# Patient Record
Sex: MALE | Race: WHITE | NOT HISPANIC OR LATINO | Employment: OTHER | URBAN - METROPOLITAN AREA
[De-identification: names, ages, dates, MRNs, and addresses within clinical notes are randomized per-mention and may not be internally consistent; named-entity substitution may affect disease eponyms.]

---

## 2017-05-24 ENCOUNTER — OFFICE VISIT (OUTPATIENT)
Dept: FAMILY MEDICINE | Facility: CLINIC | Age: 77
End: 2017-05-24
Payer: MEDICARE

## 2017-05-24 VITALS
BODY MASS INDEX: 20.57 KG/M2 | SYSTOLIC BLOOD PRESSURE: 144 MMHG | DIASTOLIC BLOOD PRESSURE: 80 MMHG | HEIGHT: 66 IN | TEMPERATURE: 98 F | WEIGHT: 128 LBS | HEART RATE: 68 BPM

## 2017-05-24 DIAGNOSIS — J06.9 UPPER RESPIRATORY TRACT INFECTION, UNSPECIFIED TYPE: Primary | ICD-10-CM

## 2017-05-24 PROCEDURE — 99999 PR PBB SHADOW E&M-EST. PATIENT-LVL III: CPT | Mod: PBBFAC,,, | Performed by: FAMILY MEDICINE

## 2017-05-24 PROCEDURE — 99214 OFFICE O/P EST MOD 30 MIN: CPT | Mod: S$GLB,,, | Performed by: FAMILY MEDICINE

## 2017-05-24 PROCEDURE — 1160F RVW MEDS BY RX/DR IN RCRD: CPT | Mod: S$GLB,,, | Performed by: FAMILY MEDICINE

## 2017-05-24 PROCEDURE — 1157F ADVNC CARE PLAN IN RCRD: CPT | Mod: S$GLB,,, | Performed by: FAMILY MEDICINE

## 2017-05-24 PROCEDURE — 1126F AMNT PAIN NOTED NONE PRSNT: CPT | Mod: S$GLB,,, | Performed by: FAMILY MEDICINE

## 2017-05-24 PROCEDURE — 1159F MED LIST DOCD IN RCRD: CPT | Mod: S$GLB,,, | Performed by: FAMILY MEDICINE

## 2017-05-24 RX ORDER — BENZONATATE 100 MG/1
100 CAPSULE ORAL 3 TIMES DAILY PRN
Qty: 30 CAPSULE | Refills: 0 | Status: SHIPPED | OUTPATIENT
Start: 2017-05-24 | End: 2017-06-03

## 2017-05-24 RX ORDER — CARBIDOPA, LEVODOPA AND ENTACAPONE 50; 200; 200 MG/1; MG/1; MG/1
1 TABLET, FILM COATED ORAL 2 TIMES DAILY
COMMUNITY
End: 2018-07-15

## 2017-05-24 NOTE — PROGRESS NOTES
Subjective:       Patient ID: Toni Kamara is a 77 y.o. male.    Chief Complaint: Cough (pt states he has had cold for about a week. Cough has worsened, with green mucus. ) and Chest Congestion (denies sore throat or fever. )    HPI   Patient with c/o 1 week productive cough with congestion. He does recall that it started with a post-nasal drip. Afebrile throughout.   Not taking any otcs other than cough drops.   Parkinson sx are controlled. He feels well otherwise. Does not typically get sick, and does not use otcs.    Review of Systems   Constitutional: Positive for fatigue. Negative for fever.   HENT: Positive for congestion and postnasal drip. Negative for rhinorrhea, sinus pressure, sneezing and sore throat.    Eyes: Negative for discharge and redness.   Respiratory: Positive for cough. Negative for shortness of breath and wheezing.    Gastrointestinal: Negative for diarrhea and nausea.   Neurological: Negative for dizziness and headaches.       Objective:      Physical Exam   Constitutional: He is oriented to person, place, and time. He appears well-developed and well-nourished.   HENT:   Head: Normocephalic and atraumatic.   Right Ear: External ear normal. No middle ear effusion.   Left Ear: External ear normal.  No middle ear effusion.   Nose: Mucosal edema and rhinorrhea present.   Mouth/Throat: Posterior oropharyngeal erythema present. No oropharyngeal exudate.   Cobblestoning op   Eyes: Conjunctivae and EOM are normal. Pupils are equal, round, and reactive to light. Right eye exhibits no discharge. Left eye exhibits no discharge. No scleral icterus.   Neck: Normal range of motion. Neck supple. No thyromegaly present.   Cardiovascular: Normal rate, regular rhythm and normal heart sounds.    Pulmonary/Chest: Effort normal and breath sounds normal. No respiratory distress. He has no wheezes. He has no rales.   Abdominal: Soft. Bowel sounds are normal. He exhibits no distension. There is no tenderness.    Lymphadenopathy:     He has no cervical adenopathy.   Neurological: He is alert and oriented to person, place, and time.   Skin: Skin is warm and dry. No rash noted.   Nursing note and vitals reviewed.        Upper respiratory tract infection, unspecified type  Side effects and precautions of medication use reviewed with patient, expressed understanding. No questions or concerns. Expected course of illness and sx tx incl otc med use reviewed. Notify MD if sx persist or worsen.   -     benzonatate (TESSALON) 100 MG capsule; Take 1 capsule (100 mg total) by mouth 3 (three) times daily as needed.  Dispense: 30 capsule; Refill: 0  Handwritten rx for abx (zpak) to start if no improvement 24-48hrs.  Medications compared to his current list for potential interactions.

## 2017-07-10 ENCOUNTER — TELEPHONE (OUTPATIENT)
Dept: FAMILY MEDICINE | Facility: CLINIC | Age: 77
End: 2017-07-10

## 2017-07-10 DIAGNOSIS — G25.0 ESSENTIAL TREMOR: Primary | ICD-10-CM

## 2017-07-10 NOTE — TELEPHONE ENCOUNTER
----- Message from RT Alma sent at 7/10/2017  3:29 PM CDT -----  Contact: pt    pt , pt requesting an appt to be worked in tomorrow 07/11/2017 during the afternoon for medication refill, thanks.

## 2017-07-10 NOTE — TELEPHONE ENCOUNTER
Pt was informed that Dr. Stout does not have an opening till 07/24/2017 pt wanted to schedule to see Malena Odom NP on 08/01/2017 tried to get him to come in sooner but he said that 08/01/2017 will be ok for him .

## 2017-07-10 NOTE — TELEPHONE ENCOUNTER
----- Message from Morris Foster sent at 7/10/2017  9:01 AM CDT -----  Contact: self  [phone]  Patient called asking for orders for labs to check diabetes.  Patient asking to come in today to get labs done.Thanks!

## 2017-07-10 NOTE — TELEPHONE ENCOUNTER
Attempted to contact pt to inform him that lab orders have been placed and pt needs to contact us so that we can schedule labs for him; no answer; left message to return call.

## 2017-07-18 ENCOUNTER — PATIENT OUTREACH (OUTPATIENT)
Dept: ADMINISTRATIVE | Facility: HOSPITAL | Age: 77
End: 2017-07-18

## 2017-07-18 NOTE — LETTER
July 18, 2017    Toni Kamara  [id number] [address]  [place] [address]             [facility]  [address]  [facility] [address]  Phone: [phone] Dear Mr. Kamara:    [facility] is committed to your overall health.  To help you get the most out of each of your visits, we will review your information to make sure you are up to date on all of your recommended tests and/or procedures.      Malena Odom [facility] has found that you may be due for tetanus, shingles, and pneumonia immunizations.     Medicare does not cover all immunizations to be given in the clinic.  Check your benefits to ensure that you do not need to receive your immunizations at the pharmacy.    If you have had any of the above done at another facility, please bring the records or information with you so that your record at [facility] will be complete.  If you would like to schedule any of these, please contact me.    If you are currently taking medication, please bring it with you to your appointment for review.    Also, if you have any type of Advanced Directives, please bring them with you to your office visit so we may scan them into your chart.    If you have any questions or concerns, please don't hesitate to call.    Thank you for letting us care for you,  Mckenna Ramos LPN Clinical Care Coordinator  [facility]  [phone]

## 2017-07-31 ENCOUNTER — TELEPHONE (OUTPATIENT)
Dept: FAMILY MEDICINE | Facility: CLINIC | Age: 77
End: 2017-07-31

## 2017-07-31 NOTE — TELEPHONE ENCOUNTER
----- Message from Emmett Burris sent at 7/31/2017  2:09 PM CDT -----  Contact: same  Patient called in and stated he has his lab work & appt tomorrow 8/1 and would like to check his calcium levels also in his lab work.  Patient call back number is [phone]

## 2017-08-01 ENCOUNTER — OFFICE VISIT (OUTPATIENT)
Dept: FAMILY MEDICINE | Facility: CLINIC | Age: 77
End: 2017-08-01
Payer: MEDICARE

## 2017-08-01 ENCOUNTER — LAB VISIT (OUTPATIENT)
Dept: LAB | Facility: HOSPITAL | Age: 77
End: 2017-08-01
Attending: FAMILY MEDICINE
Payer: MEDICARE

## 2017-08-01 VITALS
WEIGHT: 128.88 LBS | DIASTOLIC BLOOD PRESSURE: 71 MMHG | HEIGHT: 66 IN | TEMPERATURE: 98 F | SYSTOLIC BLOOD PRESSURE: 120 MMHG | OXYGEN SATURATION: 97 % | HEART RATE: 89 BPM | BODY MASS INDEX: 20.71 KG/M2

## 2017-08-01 DIAGNOSIS — G25.0 ESSENTIAL TREMOR: ICD-10-CM

## 2017-08-01 DIAGNOSIS — F51.04 PSYCHOPHYSIOLOGICAL INSOMNIA: Primary | ICD-10-CM

## 2017-08-01 DIAGNOSIS — G20.A1 PARKINSON DISEASE: ICD-10-CM

## 2017-08-01 LAB
ALBUMIN SERPL BCP-MCNC: 4 G/DL
ALP SERPL-CCNC: 50 U/L
ALT SERPL W/O P-5'-P-CCNC: <5 U/L
ANION GAP SERPL CALC-SCNC: 9 MMOL/L
AST SERPL-CCNC: 21 U/L
BILIRUB SERPL-MCNC: 1.1 MG/DL
BUN SERPL-MCNC: 23 MG/DL
CALCIUM SERPL-MCNC: 9.5 MG/DL
CHLORIDE SERPL-SCNC: 109 MMOL/L
CO2 SERPL-SCNC: 26 MMOL/L
CREAT SERPL-MCNC: 1.4 MG/DL
EST. GFR  (AFRICAN AMERICAN): 56 ML/MIN/1.73 M^2
EST. GFR  (NON AFRICAN AMERICAN): 48.5 ML/MIN/1.73 M^2
ESTIMATED AVG GLUCOSE: 105 MG/DL
GLUCOSE SERPL-MCNC: 86 MG/DL
HBA1C MFR BLD HPLC: 5.3 %
POTASSIUM SERPL-SCNC: 4.3 MMOL/L
PROT SERPL-MCNC: 7.1 G/DL
SODIUM SERPL-SCNC: 144 MMOL/L

## 2017-08-01 PROCEDURE — 99999 PR PBB SHADOW E&M-EST. PATIENT-LVL III: CPT | Mod: PBBFAC,,, | Performed by: NURSE PRACTITIONER

## 2017-08-01 PROCEDURE — 1159F MED LIST DOCD IN RCRD: CPT | Mod: S$GLB,,, | Performed by: NURSE PRACTITIONER

## 2017-08-01 PROCEDURE — 1126F AMNT PAIN NOTED NONE PRSNT: CPT | Mod: S$GLB,,, | Performed by: NURSE PRACTITIONER

## 2017-08-01 PROCEDURE — 99213 OFFICE O/P EST LOW 20 MIN: CPT | Mod: S$GLB,,, | Performed by: NURSE PRACTITIONER

## 2017-08-01 PROCEDURE — 99499 UNLISTED E&M SERVICE: CPT | Mod: S$PBB,,, | Performed by: NURSE PRACTITIONER

## 2017-08-01 RX ORDER — CLONAZEPAM 2 MG/1
TABLET ORAL
Qty: 90 TABLET | Refills: 0 | Status: SHIPPED | OUTPATIENT
Start: 2017-08-01 | End: 2017-08-01

## 2017-08-01 RX ORDER — CLONAZEPAM 2 MG/1
TABLET ORAL
Qty: 90 TABLET | Refills: 1 | Status: SHIPPED | OUTPATIENT
Start: 2017-08-01 | End: 2018-02-05 | Stop reason: SDUPTHER

## 2017-08-01 NOTE — PROGRESS NOTES
Subjective:       Patient ID: Toni Kamara is a 77 y.o. male.    Chief Complaint: Medication Refill    HPI     Patient presents to clinic for medication refill. He is currently maintained on klonopin 2mg for insomnia and takes nightly PRN.   Reports that he has been maintained on this regimen for > 30 years without adverse effects.    reviewed and is consistent.     Review of Systems   Constitutional: Negative for activity change, chills and fever.   Respiratory: Negative for cough, shortness of breath and stridor.    Cardiovascular: Negative for chest pain and palpitations.   Psychiatric/Behavioral: Negative for confusion, dysphoric mood, hallucinations, self-injury, sleep disturbance and suicidal ideas. The patient is not nervous/anxious.        Objective:      Physical Exam   Constitutional: He is oriented to person, place, and time. He appears well-developed and well-nourished.   HENT:   Head: Normocephalic and atraumatic.   Cardiovascular: Normal rate, regular rhythm, normal heart sounds and intact distal pulses.    No murmur heard.  Pulmonary/Chest: Effort normal and breath sounds normal. No respiratory distress. He has no wheezes. He has no rales.   Musculoskeletal: Normal range of motion. He exhibits no edema, tenderness or deformity.   Neurological: He is alert and oriented to person, place, and time. No cranial nerve deficit.   Skin: Skin is warm and dry.   Psychiatric: He has a normal mood and affect. His behavior is normal.   Nursing note and vitals reviewed.      Assessment:       1. Psychophysiological insomnia    2. Parkinson disease        Plan:   Toni Brooks was seen today for medication refill.    Diagnoses and all orders for this visit:    Psychophysiological insomnia  -     clonazePAM (KLONOPIN) 2 MG Tab; TAKE ONE TABLET BY MOUTH ONCE DAILY AT BEDTIME AS NEEDED  Side effects and precautions of medication use reviewed with patient, expressed understanding. No questions or  concerns.    Parkinson disease  Stable. Continue current regimen. F/u with neurology.

## 2017-12-27 ENCOUNTER — TELEPHONE (OUTPATIENT)
Dept: FAMILY MEDICINE | Facility: CLINIC | Age: 77
End: 2017-12-27

## 2017-12-27 NOTE — TELEPHONE ENCOUNTER
Tried to reach pt. No answer, left msg to call back.    Contacting to inform pt that there are no openings okay and that the appt for tomorrow is the earliest to be seen.

## 2017-12-28 ENCOUNTER — OFFICE VISIT (OUTPATIENT)
Dept: FAMILY MEDICINE | Facility: CLINIC | Age: 77
End: 2017-12-28
Payer: MEDICARE

## 2017-12-28 VITALS
HEART RATE: 68 BPM | TEMPERATURE: 99 F | WEIGHT: 126.88 LBS | DIASTOLIC BLOOD PRESSURE: 78 MMHG | HEIGHT: 66 IN | SYSTOLIC BLOOD PRESSURE: 136 MMHG | BODY MASS INDEX: 20.39 KG/M2

## 2017-12-28 DIAGNOSIS — I49.1 PREMATURE CONTRACTIONS, ATRIAL: ICD-10-CM

## 2017-12-28 DIAGNOSIS — I10 ESSENTIAL HYPERTENSION: Primary | ICD-10-CM

## 2017-12-28 PROCEDURE — 99499 UNLISTED E&M SERVICE: CPT | Mod: S$PBB,,, | Performed by: FAMILY MEDICINE

## 2017-12-28 PROCEDURE — 99214 OFFICE O/P EST MOD 30 MIN: CPT | Mod: S$GLB,,, | Performed by: FAMILY MEDICINE

## 2017-12-28 PROCEDURE — 99999 PR PBB SHADOW E&M-EST. PATIENT-LVL III: CPT | Mod: PBBFAC,,, | Performed by: FAMILY MEDICINE

## 2017-12-28 RX ORDER — LOSARTAN POTASSIUM 50 MG/1
50 TABLET ORAL DAILY
Qty: 90 TABLET | Refills: 3 | Status: SHIPPED | OUTPATIENT
Start: 2017-12-28 | End: 2019-01-02 | Stop reason: SDUPTHER

## 2017-12-28 NOTE — PROGRESS NOTES
"Subjective:       Patient ID: Toni Kamara is a 77 y.o. male.    Chief Complaint: Hypertension    He was late today because he had trouble finding her new clinic.  He was noted to have high blood pressure at the end of November when he visited his urologist.  He sees Dr Youssef in [place].  His readings range from 150-170.  He has taken blood pressure at home with a wrist cuff which shows as high as 180.  An arm cuff at the same time shows around 125.  Grocery store readings have shown between 135 and 145.  He was given a blood pressure medication but did not take it and does not remember which one it was.  He does not consume a very salty diet.  He has Parkinson's and he seems to be coping fairly well with that.  He is a little slow to start ambulation but then is able to move well.      Hypertension   Pertinent negatives include no chest pain, palpitations or shortness of breath.     Review of Systems   Constitutional: Negative for unexpected weight change.   Respiratory: Negative for cough and shortness of breath.    Cardiovascular: Negative for chest pain, palpitations and leg swelling.       Objective:     Blood pressure 136/78, pulse 68, temperature 98.6 °F (37 °C), height 5' 6" (1.676 m), weight 57.6 kg (126 lb 14 oz).   repeat readings on the right 155/88.  Left arm 162/94.  Difficult to be precise because of frequent PCs    Physical Exam   Constitutional: He appears well-developed and well-nourished. No distress.   Cardiovascular:   His heart rhythm is slightly irregular with frequent PVCs.  No heart murmur or S3 heard.  No carotid bruits.  No abdominal bruits.   Pulmonary/Chest: Effort normal and breath sounds normal. No respiratory distress. He has no wheezes.   Musculoskeletal: He exhibits no edema.   Neurological:   Right hand and arm tremor is the most prominent.  Slow to get out of a chair and start to walk.       Assessment:       1. Essential hypertension    2. Premature contractions, atrial  "       Plan:       Start losartan 50 mg.  Follow-up with me in 4 weeks.  EKG and lab work including urinalysis ordered.  I will also get an echocardiogram.  His wife expressed concern.

## 2018-01-02 ENCOUNTER — LAB VISIT (OUTPATIENT)
Dept: LAB | Facility: HOSPITAL | Age: 78
End: 2018-01-02
Attending: FAMILY MEDICINE
Payer: MEDICARE

## 2018-01-02 ENCOUNTER — CLINICAL SUPPORT (OUTPATIENT)
Dept: CARDIOLOGY | Facility: CLINIC | Age: 78
End: 2018-01-02
Payer: MEDICARE

## 2018-01-02 ENCOUNTER — CLINICAL SUPPORT (OUTPATIENT)
Dept: CARDIOLOGY | Facility: CLINIC | Age: 78
End: 2018-01-02
Attending: FAMILY MEDICINE
Payer: MEDICARE

## 2018-01-02 DIAGNOSIS — I10 ESSENTIAL HYPERTENSION: ICD-10-CM

## 2018-01-02 LAB
ALBUMIN SERPL BCP-MCNC: 4.1 G/DL
ALP SERPL-CCNC: 56 U/L
ALT SERPL W/O P-5'-P-CCNC: 10 U/L
ANION GAP SERPL CALC-SCNC: 5 MMOL/L
AST SERPL-CCNC: 25 U/L
BILIRUB SERPL-MCNC: 0.7 MG/DL
BUN SERPL-MCNC: 17 MG/DL
CALCIUM SERPL-MCNC: 9.6 MG/DL
CHLORIDE SERPL-SCNC: 105 MMOL/L
CHOLEST SERPL-MCNC: 175 MG/DL
CHOLEST/HDLC SERPL: 2.6 {RATIO}
CO2 SERPL-SCNC: 29 MMOL/L
CREAT SERPL-MCNC: 1.2 MG/DL
ERYTHROCYTE [DISTWIDTH] IN BLOOD BY AUTOMATED COUNT: 12.8 %
EST. GFR  (AFRICAN AMERICAN): >60 ML/MIN/1.73 M^2
EST. GFR  (NON AFRICAN AMERICAN): 58.4 ML/MIN/1.73 M^2
GLUCOSE SERPL-MCNC: 96 MG/DL
HCT VFR BLD AUTO: 44 %
HDLC SERPL-MCNC: 68 MG/DL
HDLC SERPL: 38.9 %
HGB BLD-MCNC: 14.7 G/DL
LDLC SERPL CALC-MCNC: 92 MG/DL
MCH RBC QN AUTO: 31.6 PG
MCHC RBC AUTO-ENTMCNC: 33.4 G/DL
MCV RBC AUTO: 95 FL
NONHDLC SERPL-MCNC: 107 MG/DL
PLATELET # BLD AUTO: 165 K/UL
PMV BLD AUTO: 11.1 FL
POTASSIUM SERPL-SCNC: 4.6 MMOL/L
PROT SERPL-MCNC: 7.3 G/DL
RBC # BLD AUTO: 4.65 M/UL
SODIUM SERPL-SCNC: 139 MMOL/L
TRIGL SERPL-MCNC: 75 MG/DL
WBC # BLD AUTO: 4.8 K/UL

## 2018-01-02 PROCEDURE — 80053 COMPREHEN METABOLIC PANEL: CPT

## 2018-01-02 PROCEDURE — 93000 ELECTROCARDIOGRAM COMPLETE: CPT | Mod: S$GLB,,, | Performed by: INTERNAL MEDICINE

## 2018-01-02 PROCEDURE — 93306 TTE W/DOPPLER COMPLETE: CPT | Mod: S$GLB,,, | Performed by: INTERNAL MEDICINE

## 2018-01-02 PROCEDURE — 80061 LIPID PANEL: CPT

## 2018-01-02 PROCEDURE — 85027 COMPLETE CBC AUTOMATED: CPT

## 2018-01-02 PROCEDURE — 36415 COLL VENOUS BLD VENIPUNCTURE: CPT | Mod: PO

## 2018-01-04 LAB
AORTIC VALVE REGURGITATION: NORMAL
DIASTOLIC DYSFUNCTION: NO
ESTIMATED PA SYSTOLIC PRESSURE: 29.01
MITRAL VALVE MOBILITY: NORMAL
MITRAL VALVE REGURGITATION: NORMAL
RETIRED EF AND QEF - SEE NOTES: 55 (ref 55–65)
TRICUSPID VALVE REGURGITATION: NORMAL

## 2018-01-05 ENCOUNTER — TELEPHONE (OUTPATIENT)
Dept: FAMILY MEDICINE | Facility: CLINIC | Age: 78
End: 2018-01-05

## 2018-01-05 NOTE — TELEPHONE ENCOUNTER
----- Message from Marti Long sent at 1/5/2018  9:51 AM CST -----  Contact: Self  Patient is returning the doctors call, please have Doctor Stout call the patients wife Radha at [phone] or [phone], with the results of his tests.  Thank you!

## 2018-02-05 ENCOUNTER — OFFICE VISIT (OUTPATIENT)
Dept: FAMILY MEDICINE | Facility: CLINIC | Age: 78
End: 2018-02-05
Payer: MEDICARE

## 2018-02-05 VITALS
HEIGHT: 66 IN | SYSTOLIC BLOOD PRESSURE: 108 MMHG | HEART RATE: 80 BPM | DIASTOLIC BLOOD PRESSURE: 68 MMHG | WEIGHT: 128.63 LBS | BODY MASS INDEX: 20.67 KG/M2 | TEMPERATURE: 99 F

## 2018-02-05 DIAGNOSIS — I10 ESSENTIAL HYPERTENSION: ICD-10-CM

## 2018-02-05 DIAGNOSIS — F51.04 PSYCHOPHYSIOLOGICAL INSOMNIA: ICD-10-CM

## 2018-02-05 DIAGNOSIS — G20.A1 PARKINSON DISEASE: Primary | ICD-10-CM

## 2018-02-05 PROCEDURE — 1126F AMNT PAIN NOTED NONE PRSNT: CPT | Mod: S$GLB,,, | Performed by: FAMILY MEDICINE

## 2018-02-05 PROCEDURE — 1159F MED LIST DOCD IN RCRD: CPT | Mod: S$GLB,,, | Performed by: FAMILY MEDICINE

## 2018-02-05 PROCEDURE — 99999 PR PBB SHADOW E&M-EST. PATIENT-LVL III: CPT | Mod: PBBFAC,,, | Performed by: FAMILY MEDICINE

## 2018-02-05 PROCEDURE — 99499 UNLISTED E&M SERVICE: CPT | Mod: S$GLB,,, | Performed by: FAMILY MEDICINE

## 2018-02-05 PROCEDURE — 99214 OFFICE O/P EST MOD 30 MIN: CPT | Mod: S$GLB,,, | Performed by: FAMILY MEDICINE

## 2018-02-05 PROCEDURE — 3008F BODY MASS INDEX DOCD: CPT | Mod: S$GLB,,, | Performed by: FAMILY MEDICINE

## 2018-02-05 RX ORDER — CLONAZEPAM 2 MG/1
TABLET ORAL
Qty: 90 TABLET | Refills: 1 | Status: SHIPPED | OUTPATIENT
Start: 2018-02-05 | End: 2018-07-12 | Stop reason: SDUPTHER

## 2018-02-05 NOTE — PROGRESS NOTES
"Subjective:       Patient ID: Toni Kamara is a 77 y.o. male.    Chief Complaint: Follow-up (med f/u)    Mr. Kamara is a 76 yo male with a pmh significant for Parkinson disease and HTN who presents to the clinic today for lab follow up and prescription refill. Lipid panel, CBC, and CMP values were all within normal range. Patient also had an echocardiogram and EKG in December that showed no abnormalities besides mild mitral regurgitation.    HTN.  He was started on Losartan 50mg one a day in December due to high blood pressure readings in the 150-170's from he neurologist office. BP today is 108/68. Patient denies dizziness and symptoms of postural hypotension.     Parkinson disease. He is currently taking carbidopa/levodopa and ropinirole. He feels his symptoms are well controlled except for his right arm.       Review of Systems   Constitutional: Negative for activity change and appetite change.   Respiratory: Negative for chest tightness, shortness of breath and wheezing.    Cardiovascular: Negative for chest pain and palpitations.   Gastrointestinal: Negative for abdominal pain and blood in stool.   Genitourinary: Negative for difficulty urinating and dysuria.   Musculoskeletal: Positive for back pain (chronic). Negative for arthralgias and myalgias.   Neurological: Negative for dizziness, light-headedness and headaches.       Objective:     Blood pressure 108/68, pulse 80, temperature 98.8 °F (37.1 °C), temperature source Oral, height 5' 6" (1.676 m), weight 58.3 kg (128 lb 10.2 oz).      Physical Exam   Constitutional: He is oriented to person, place, and time. He appears well-developed. No distress.   He is a frail appearing man with a right hand tremor   HENT:   Head: Normocephalic.   Cardiovascular: Normal rate, regular rhythm, normal heart sounds and intact distal pulses.    Pulmonary/Chest: Effort normal and breath sounds normal. No respiratory distress.   Neurological: He is alert and oriented to " person, place, and time. Gait normal.   Gait is normal except for reduced right arm swinging. No shuffling of feet.       Assessment:       1. Parkinson disease    2. Psychophysiological insomnia    3. Essential hypertension        Plan:       1. Parkinson disease   - continue medication regimen   - follow-up with neurology  2. Insomnia   - continue clonazepam  2. HTN   - BP readings in he low 100's, patient advised to continue to monitor for signs of hypotension   - continue losartan 50 mg

## 2018-02-06 ENCOUNTER — TELEPHONE (OUTPATIENT)
Dept: FAMILY MEDICINE | Facility: CLINIC | Age: 78
End: 2018-02-06

## 2018-02-06 NOTE — TELEPHONE ENCOUNTER
Spoke with pt, he states he has occasional episode of lightheadedness throughout the day, he does not experience them if he is up and active. He has not checked his bp today. Advised him next time he has this feeling, to check his bp and give us a call with any readings of 90/70 or below. He expressed understanding.

## 2018-02-06 NOTE — TELEPHONE ENCOUNTER
----- Message from Morris Foster sent at 2/6/2018  2:12 PM CST -----  Contact: self  [phone]  Patient called to give the name of rx  losartan 50 mg. The patient states he feel light headed off and on  today. Thanks!

## 2018-04-27 ENCOUNTER — TELEPHONE (OUTPATIENT)
Dept: FAMILY MEDICINE | Facility: CLINIC | Age: 78
End: 2018-04-27

## 2018-04-27 NOTE — TELEPHONE ENCOUNTER
Tried to reach pt. No answer, left msg to call back.    [facility]

## 2018-04-27 NOTE — TELEPHONE ENCOUNTER
Spoke w/ wife and advised that pcp is not in office and then upon review of the schedule there were no opening for this clinic until Monday. wife advised to go to UC/ED w/ pt. wife verbalized understanding. wife states that they will go to the clinic in [place].

## 2018-04-27 NOTE — TELEPHONE ENCOUNTER
Tried to reach pt. No answer, left msg to call back.    [facility] Will try cell.

## 2018-04-27 NOTE — TELEPHONE ENCOUNTER
----- Message from RT Alma sent at 4/27/2018  8:33 AM CDT -----  Contact: Radha,Wife, [phone]   Radha,Wife, [phone], requesting an appt for the pt to be worked in today, after 02:00 pm, pt keeps falling and does not have any balance, thanks.

## 2018-05-02 ENCOUNTER — OFFICE VISIT (OUTPATIENT)
Dept: FAMILY MEDICINE | Facility: CLINIC | Age: 78
End: 2018-05-02
Payer: MEDICARE

## 2018-05-02 VITALS
TEMPERATURE: 98 F | SYSTOLIC BLOOD PRESSURE: 102 MMHG | DIASTOLIC BLOOD PRESSURE: 70 MMHG | WEIGHT: 128.5 LBS | HEIGHT: 66 IN | BODY MASS INDEX: 20.65 KG/M2

## 2018-05-02 DIAGNOSIS — G20.A1 PARKINSON DISEASE: ICD-10-CM

## 2018-05-02 DIAGNOSIS — G72.9 MYOPATHY: ICD-10-CM

## 2018-05-02 DIAGNOSIS — R60.0 LOCALIZED EDEMA: ICD-10-CM

## 2018-05-02 DIAGNOSIS — M79.89 LEG SWELLING: Primary | ICD-10-CM

## 2018-05-02 PROCEDURE — 3078F DIAST BP <80 MM HG: CPT | Mod: CPTII,S$GLB,, | Performed by: FAMILY MEDICINE

## 2018-05-02 PROCEDURE — 99999 PR PBB SHADOW E&M-EST. PATIENT-LVL III: CPT | Mod: PBBFAC,,, | Performed by: FAMILY MEDICINE

## 2018-05-02 PROCEDURE — 3074F SYST BP LT 130 MM HG: CPT | Mod: CPTII,S$GLB,, | Performed by: FAMILY MEDICINE

## 2018-05-02 PROCEDURE — 99215 OFFICE O/P EST HI 40 MIN: CPT | Mod: S$GLB,,, | Performed by: FAMILY MEDICINE

## 2018-05-02 RX ORDER — CARBIDOPA AND LEVODOPA 50; 200 MG/1; MG/1
1 TABLET, EXTENDED RELEASE ORAL 2 TIMES DAILY
COMMUNITY
Start: 2018-03-21 | End: 2018-05-02 | Stop reason: SDUPTHER

## 2018-05-02 RX ORDER — CARBIDOPA AND LEVODOPA 10; 100 MG/1; MG/1
TABLET ORAL
COMMUNITY
Start: 2018-03-01 | End: 2018-09-04 | Stop reason: CLARIF

## 2018-05-02 RX ORDER — AMOXICILLIN AND CLAVULANATE POTASSIUM 500; 125 MG/1; MG/1
1 TABLET, FILM COATED ORAL 2 TIMES DAILY
COMMUNITY
Start: 2018-04-30 | End: 2018-07-15

## 2018-05-02 NOTE — PROGRESS NOTES
Subjective:       Patient ID: Toni Kamara is a 78 y.o. male.    Chief Complaint: Fever; Foot Swelling; and Urinary Tract Infection    He is here for 30 minutes after his appointment time.  He is accompanied by his wife and daughter.  He has a very long history.  Over the past couple of weeks he has become less ambulatory and has fallen frequently.  He has Parkinson's and has been followed by Dr. Youssef, neurology, [place].  His carbidopa levodopa has been gradually increased with minimal benefit.  His steps have been getting slower and his ambulation getting worse and he has been needing a walker.  He reported to [facility] with weakness and some urinary urgency.  He was felt to be somewhat dehydrated based on an elevated BUN.  His daughter relates that he had a tachycardia and elevated blood pressure.  He was treated with Augmentin.  His urgency is gradually improving.  He did have a temperature to 99.6 earlier this morning with no chills or sweats.  His blood pressure is a bit low today.  He continues taking losartan 50 mg.  I reviewed some of his lab work.  He had a normal CBC and an elevated CPK of 569.  His BUN was 26 and creatinine was 1.07.  His serum albumin was 4.1.  His liver enzymes were normal.  His troponin I was normal.  He had a negative brain CT scan without contrast.  It showed mild prominence of the ventricles compatible with mild involutional changes.  There was no intracranial hemorrhage.  He had a CT of the pelvis as well as a negative chest x-ray.  He developed bilateral leg swelling worse on the left over the past month.  It has worsened since his hospitalization.  He is also having an altered sleep pattern.      Fever    Pertinent negatives include no abdominal pain, chest pain or coughing.   Urinary Tract Infection    Associated symptoms include urgency.     Review of Systems   Constitutional: Positive for fever. Negative for appetite change and unexpected weight  "change.   Respiratory: Negative for cough and shortness of breath.    Cardiovascular: Positive for leg swelling. Negative for chest pain.   Gastrointestinal: Negative for abdominal pain.   Genitourinary: Positive for urgency.   Musculoskeletal: Positive for gait problem.       Objective:     Blood pressure 102/70, temperature 98.4 °F (36.9 °C), temperature source Oral, height 5' 6" (1.676 m), weight 58.3 kg (128 lb 8.5 oz).      Physical Exam   Constitutional:   He is a thin male that looks a little fragile   Neck: No thyromegaly present.   Cardiovascular: Normal rate and regular rhythm.    Pulmonary/Chest: Effort normal and breath sounds normal. No respiratory distress.   Abdominal: There is no tenderness.   Musculoskeletal: He exhibits edema (3+ left foot and lower leg edema palpable pedal pulses on the right.  I'm not able to feel them on the left because of the swelling. edema on the right).   Lymphadenopathy:     He has no cervical adenopathy.   Neurological: He is alert.   He had to push a little bit to get out of his chair.  He was a bit unstable but was able to walk reasonably well with a walker.  His family reports that oftentimes his steps are short and less he is coached.  There is no sign of facial asymmetry or asymmetric muscle weakness.   Skin: No erythema.       Assessment:       1. Leg swelling    2. Parkinson disease    3. Localized edema     4. Myopathy         Plan:       Leg edema which may be venous insufficiency or DVT.  He does not seem to have CHF, renal failure, hepatic failure.  Venous ultrasound ordered.  Follow-up lab work ordered.  Elevated CPK may be trauma or polymyositis.  The family requests free T3 and free T4 as well as homocystine.  He is seeing his neurologist tomorrow.  They are asking the question of a recent stroke.  I will leave that to his judgment.   50 minutes were spent with this visit, more than half with discussion of decision-making.  "

## 2018-05-08 ENCOUNTER — HOSPITAL ENCOUNTER (OUTPATIENT)
Dept: RADIOLOGY | Facility: HOSPITAL | Age: 78
Discharge: HOME | End: 2018-05-08
Attending: FAMILY MEDICINE
Payer: MEDICARE

## 2018-05-08 DIAGNOSIS — M79.89 LEG SWELLING: ICD-10-CM

## 2018-05-08 DIAGNOSIS — R60.0 LOCALIZED EDEMA: ICD-10-CM

## 2018-05-08 PROCEDURE — 93970 EXTREMITY STUDY: CPT | Mod: TC,PO

## 2018-05-08 PROCEDURE — 93970 EXTREMITY STUDY: CPT | Mod: 26,,, | Performed by: RADIOLOGY

## 2018-06-13 ENCOUNTER — TELEPHONE (OUTPATIENT)
Dept: FAMILY MEDICINE | Facility: CLINIC | Age: 78
End: 2018-06-13

## 2018-06-13 NOTE — TELEPHONE ENCOUNTER
"Spoke to pt. Pt states he took his blood pressure at [facility] "yesterday" and it was 80/45.  He takes his losartan at night and took it last night after the low reading at [facility].  Pt unable to take his blood pressure at this time, therefore I asked him to come to the clinic to have a manual reading done.  Pt agreed and will have his wife drive him to clinic.   "

## 2018-06-13 NOTE — TELEPHONE ENCOUNTER
----- Message from Clair Vargas sent at 6/13/2018  3:01 PM CDT -----  Contact: self  Patient's physical therapist came over to the house and  took his blood pressure and it is 110 over 70. Patient will not be coming in today. Please call wife at Radha at [phone] if any questions. Thanks!

## 2018-06-18 ENCOUNTER — TELEPHONE (OUTPATIENT)
Dept: ADMINISTRATIVE | Facility: CLINIC | Age: 78
End: 2018-06-18

## 2018-07-12 DIAGNOSIS — F51.04 PSYCHOPHYSIOLOGICAL INSOMNIA: ICD-10-CM

## 2018-07-13 NOTE — TELEPHONE ENCOUNTER
I cannot authorize 90 of the 2 mg lorazepam for a 78-year-old patient with Parkinson's disease.  This must wait until his PCP returns.  If the patient is completely out and in danger of withdrawal that I can send in a very limited supply.  But I do not authorize 90 days supply for controlled substances even for my patients that I am familiar with.

## 2018-07-15 ENCOUNTER — HOSPITAL ENCOUNTER (OUTPATIENT)
Facility: HOSPITAL | Age: 78
Discharge: HOME HEALTH | End: 2018-07-16
Attending: EMERGENCY MEDICINE | Admitting: INTERNAL MEDICINE
Payer: MEDICARE

## 2018-07-15 DIAGNOSIS — I63.9 CEREBROVASCULAR ACCIDENT (CVA), UNSPECIFIED MECHANISM: ICD-10-CM

## 2018-07-15 DIAGNOSIS — I63.9 CVA (CEREBRAL VASCULAR ACCIDENT): ICD-10-CM

## 2018-07-15 DIAGNOSIS — E86.0 DEHYDRATION: Primary | ICD-10-CM

## 2018-07-15 PROBLEM — R29.898 LEFT ARM WEAKNESS: Status: ACTIVE | Noted: 2018-07-15

## 2018-07-15 PROBLEM — R53.81 DEBILITY: Status: ACTIVE | Noted: 2018-07-15

## 2018-07-15 PROBLEM — R29.6 FREQUENT FALLS: Status: ACTIVE | Noted: 2018-07-15

## 2018-07-15 LAB
ALBUMIN SERPL BCP-MCNC: 4 G/DL
ALP SERPL-CCNC: 92 U/L
ALT SERPL W/O P-5'-P-CCNC: 12 U/L
ANION GAP SERPL CALC-SCNC: 10 MMOL/L
AST SERPL-CCNC: 50 U/L
BASOPHILS # BLD AUTO: 0 K/UL
BASOPHILS NFR BLD: 0 %
BILIRUB SERPL-MCNC: 1.3 MG/DL
BILIRUB UR QL STRIP: NEGATIVE
BUN SERPL-MCNC: 27 MG/DL
CALCIUM SERPL-MCNC: 9.8 MG/DL
CHLORIDE SERPL-SCNC: 108 MMOL/L
CLARITY UR: CLEAR
CO2 SERPL-SCNC: 27 MMOL/L
COLOR UR: YELLOW
CREAT SERPL-MCNC: 1.3 MG/DL
DIFFERENTIAL METHOD: ABNORMAL
EOSINOPHIL # BLD AUTO: 0 K/UL
EOSINOPHIL NFR BLD: 0.1 %
ERYTHROCYTE [DISTWIDTH] IN BLOOD BY AUTOMATED COUNT: 13.2 %
EST. GFR  (AFRICAN AMERICAN): >60 ML/MIN/1.73 M^2
EST. GFR  (NON AFRICAN AMERICAN): 53 ML/MIN/1.73 M^2
GLUCOSE SERPL-MCNC: 92 MG/DL
GLUCOSE UR QL STRIP: NEGATIVE
HCT VFR BLD AUTO: 41.9 %
HGB BLD-MCNC: 13.8 G/DL
HGB UR QL STRIP: NEGATIVE
KETONES UR QL STRIP: ABNORMAL
LACTATE SERPL-SCNC: 1.2 MMOL/L
LACTATE SERPL-SCNC: 1.3 MMOL/L
LEUKOCYTE ESTERASE UR QL STRIP: NEGATIVE
LYMPHOCYTES # BLD AUTO: 1 K/UL
LYMPHOCYTES NFR BLD: 15.3 %
MCH RBC QN AUTO: 30.8 PG
MCHC RBC AUTO-ENTMCNC: 32.9 G/DL
MCV RBC AUTO: 94 FL
MONOCYTES # BLD AUTO: 0.6 K/UL
MONOCYTES NFR BLD: 10 %
NEUTROPHILS # BLD AUTO: 4.6 K/UL
NEUTROPHILS NFR BLD: 74.6 %
NITRITE UR QL STRIP: NEGATIVE
PH UR STRIP: 6 [PH] (ref 5–8)
PLATELET # BLD AUTO: 155 K/UL
PMV BLD AUTO: 8.1 FL
POTASSIUM SERPL-SCNC: 4 MMOL/L
PROT SERPL-MCNC: 6.8 G/DL
PROT UR QL STRIP: NEGATIVE
RBC # BLD AUTO: 4.47 M/UL
SODIUM SERPL-SCNC: 145 MMOL/L
SP GR UR STRIP: 1.02 (ref 1–1.03)
URN SPEC COLLECT METH UR: ABNORMAL
UROBILINOGEN UR STRIP-ACNC: NEGATIVE EU/DL
WBC # BLD AUTO: 6.2 K/UL

## 2018-07-15 PROCEDURE — 80053 COMPREHEN METABOLIC PANEL: CPT

## 2018-07-15 PROCEDURE — 85025 COMPLETE CBC W/AUTO DIFF WBC: CPT

## 2018-07-15 PROCEDURE — 81003 URINALYSIS AUTO W/O SCOPE: CPT

## 2018-07-15 PROCEDURE — 99291 CRITICAL CARE FIRST HOUR: CPT | Mod: 25

## 2018-07-15 PROCEDURE — G0378 HOSPITAL OBSERVATION PER HR: HCPCS

## 2018-07-15 PROCEDURE — 96360 HYDRATION IV INFUSION INIT: CPT

## 2018-07-15 PROCEDURE — 96372 THER/PROPH/DIAG INJ SC/IM: CPT

## 2018-07-15 PROCEDURE — 87086 URINE CULTURE/COLONY COUNT: CPT

## 2018-07-15 PROCEDURE — 63600175 PHARM REV CODE 636 W HCPCS: Performed by: NURSE PRACTITIONER

## 2018-07-15 PROCEDURE — 83605 ASSAY OF LACTIC ACID: CPT | Mod: 91

## 2018-07-15 PROCEDURE — 96361 HYDRATE IV INFUSION ADD-ON: CPT

## 2018-07-15 PROCEDURE — 25000003 PHARM REV CODE 250: Performed by: NURSE PRACTITIONER

## 2018-07-15 PROCEDURE — 87040 BLOOD CULTURE FOR BACTERIA: CPT | Mod: 59

## 2018-07-15 PROCEDURE — 36415 COLL VENOUS BLD VENIPUNCTURE: CPT

## 2018-07-15 PROCEDURE — 25000003 PHARM REV CODE 250: Performed by: EMERGENCY MEDICINE

## 2018-07-15 RX ORDER — LANOLIN ALCOHOL/MO/W.PET/CERES
800 CREAM (GRAM) TOPICAL
Status: DISCONTINUED | OUTPATIENT
Start: 2018-07-15 | End: 2018-07-16 | Stop reason: HOSPADM

## 2018-07-15 RX ORDER — AMOXICILLIN 250 MG
1 CAPSULE ORAL 2 TIMES DAILY
Status: DISCONTINUED | OUTPATIENT
Start: 2018-07-15 | End: 2018-07-16 | Stop reason: HOSPADM

## 2018-07-15 RX ORDER — IBUPROFEN 200 MG
24 TABLET ORAL
Status: DISCONTINUED | OUTPATIENT
Start: 2018-07-15 | End: 2018-07-16 | Stop reason: HOSPADM

## 2018-07-15 RX ORDER — SODIUM CHLORIDE 9 MG/ML
INJECTION, SOLUTION INTRAVENOUS CONTINUOUS
Status: DISCONTINUED | OUTPATIENT
Start: 2018-07-15 | End: 2018-07-15

## 2018-07-15 RX ORDER — ACETAMINOPHEN 500 MG
1000 TABLET ORAL EVERY 6 HOURS PRN
Status: DISCONTINUED | OUTPATIENT
Start: 2018-07-15 | End: 2018-07-16 | Stop reason: HOSPADM

## 2018-07-15 RX ORDER — LOSARTAN POTASSIUM 25 MG/1
50 TABLET ORAL DAILY
Status: DISCONTINUED | OUTPATIENT
Start: 2018-07-16 | End: 2018-07-16 | Stop reason: HOSPADM

## 2018-07-15 RX ORDER — SODIUM CHLORIDE 0.9 % (FLUSH) 0.9 %
5 SYRINGE (ML) INJECTION
Status: DISCONTINUED | OUTPATIENT
Start: 2018-07-15 | End: 2018-07-16 | Stop reason: HOSPADM

## 2018-07-15 RX ORDER — RAMELTEON 8 MG/1
8 TABLET ORAL NIGHTLY PRN
Status: DISCONTINUED | OUTPATIENT
Start: 2018-07-15 | End: 2018-07-16 | Stop reason: HOSPADM

## 2018-07-15 RX ORDER — POTASSIUM CHLORIDE 20 MEQ/15ML
60 SOLUTION ORAL
Status: DISCONTINUED | OUTPATIENT
Start: 2018-07-15 | End: 2018-07-16 | Stop reason: HOSPADM

## 2018-07-15 RX ORDER — OXYCODONE AND ACETAMINOPHEN 5; 325 MG/1; MG/1
1 TABLET ORAL DAILY PRN
Status: DISCONTINUED | OUTPATIENT
Start: 2018-07-15 | End: 2018-07-16 | Stop reason: HOSPADM

## 2018-07-15 RX ORDER — SODIUM CHLORIDE 9 MG/ML
INJECTION, SOLUTION INTRAVENOUS CONTINUOUS
Status: DISCONTINUED | OUTPATIENT
Start: 2018-07-15 | End: 2018-07-16

## 2018-07-15 RX ORDER — CARBIDOPA AND LEVODOPA 25; 100 MG/1; MG/1
1 TABLET ORAL 3 TIMES DAILY
Status: DISCONTINUED | OUTPATIENT
Start: 2018-07-15 | End: 2018-07-15

## 2018-07-15 RX ORDER — CLONAZEPAM 1 MG/1
2 TABLET ORAL NIGHTLY
Status: DISCONTINUED | OUTPATIENT
Start: 2018-07-15 | End: 2018-07-16 | Stop reason: HOSPADM

## 2018-07-15 RX ORDER — ONDANSETRON 2 MG/ML
8 INJECTION INTRAMUSCULAR; INTRAVENOUS EVERY 8 HOURS PRN
Status: DISCONTINUED | OUTPATIENT
Start: 2018-07-15 | End: 2018-07-16 | Stop reason: HOSPADM

## 2018-07-15 RX ORDER — GLUCAGON 1 MG
1 KIT INJECTION
Status: DISCONTINUED | OUTPATIENT
Start: 2018-07-15 | End: 2018-07-16 | Stop reason: HOSPADM

## 2018-07-15 RX ORDER — ENOXAPARIN SODIUM 100 MG/ML
40 INJECTION SUBCUTANEOUS EVERY 24 HOURS
Status: DISCONTINUED | OUTPATIENT
Start: 2018-07-15 | End: 2018-07-16 | Stop reason: HOSPADM

## 2018-07-15 RX ORDER — IBUPROFEN 200 MG
16 TABLET ORAL
Status: DISCONTINUED | OUTPATIENT
Start: 2018-07-15 | End: 2018-07-16 | Stop reason: HOSPADM

## 2018-07-15 RX ORDER — CARBIDOPA AND LEVODOPA 25; 100 MG/1; MG/1
1 TABLET ORAL 4 TIMES DAILY
Status: DISCONTINUED | OUTPATIENT
Start: 2018-07-15 | End: 2018-07-16 | Stop reason: HOSPADM

## 2018-07-15 RX ORDER — POTASSIUM CHLORIDE 20 MEQ/15ML
40 SOLUTION ORAL
Status: DISCONTINUED | OUTPATIENT
Start: 2018-07-15 | End: 2018-07-16 | Stop reason: HOSPADM

## 2018-07-15 RX ADMIN — OXYCODONE AND ACETAMINOPHEN 1 TABLET: 5; 325 TABLET ORAL at 09:07

## 2018-07-15 RX ADMIN — STANDARDIZED SENNA CONCENTRATE AND DOCUSATE SODIUM 1 TABLET: 8.6; 5 TABLET, FILM COATED ORAL at 09:07

## 2018-07-15 RX ADMIN — CARBIDOPA AND LEVODOPA 1 TABLET: 25; 100 TABLET ORAL at 09:07

## 2018-07-15 RX ADMIN — SODIUM CHLORIDE 1743 ML: 0.9 INJECTION, SOLUTION INTRAVENOUS at 04:07

## 2018-07-15 RX ADMIN — CLONAZEPAM 2 MG: 1 TABLET ORAL at 11:07

## 2018-07-15 RX ADMIN — SODIUM CHLORIDE: 0.9 INJECTION, SOLUTION INTRAVENOUS at 09:07

## 2018-07-15 RX ADMIN — ENOXAPARIN SODIUM 40 MG: 100 INJECTION SUBCUTANEOUS at 09:07

## 2018-07-15 NOTE — ED PROVIDER NOTES
Encounter Date: 7/15/2018    SCRIBE #1 NOTE: I, Francoise Escobar, am scribing for, and in the presence of, Dr. Abdullahi.       History     Chief Complaint   Patient presents with    Weakness     hx. UTI    Fall       07/15/2018  3:32 PM    Chief Complaint: Weakness      The patient is a 78 y.o. male who presents s/p fall shortly PTA with complaint of generalized weakness with associated dry-mouth. Pt states he slipped getting out of bed and was on the ground for a few hours. Per spouse, he has had 8-9 falls during the last week. He had a similar set of falls 4 months ago and he was diagnosed with a UTI. His spouse is concerned he is dehydrated and has a UTI again. Pt was seen at [facility] yesterday s/p fall. He had a U/A and was cleared to go home. He did not have a UCx. Pt was given 1 tablet of oxycodone 3 hours ago. After his fall yesterday, his left arm was pinched between a cabinet door and he lost the use of his left hand. Per spouse, [facility] said this would improve, but it has not. Spouse also endorses constipation despite use of laxatives. No other exacerbating or alleviating factors. Denies vomiting, diarrhea, fever, cough. PMHx of BPH and parkinsons. No pertinent PSHx.       The history is provided by the patient, the EMS personnel and a relative.     Review of patient's allergies indicates:  No Known Allergies  Past Medical History:   Diagnosis Date    Anxiety 1/25/2012    Arthritis of knee 1/25/2012    Back pain 1/25/2012    BPH (benign prostatic hyperplasia) 1/25/2012    Essential tremor 3/19/2014    Kyphoscoliosis 1/25/2012     Past Surgical History:   Procedure Laterality Date    APPENDECTOMY      FRACTURE SURGERY Left Dec 18, 2014    HEMORRHOID SURGERY      PROSTATE SURGERY  2008    TURP and had renal insu from obsr    TONSILLECTOMY       Family History   Problem Relation Age of Onset    Heart disease Mother 79        Coronary stent    Parkinsonism Father 83     Social History   Substance Use Topics     Smoking status: Never Smoker    Smokeless tobacco: Never Used    Alcohol use No     Review of Systems   Constitutional: Negative for fever.   HENT: Negative for sore throat.         +dry-mouth   Respiratory: Negative for cough and shortness of breath.    Cardiovascular: Negative for chest pain.   Gastrointestinal: Positive for constipation. Negative for abdominal pain, diarrhea, nausea and vomiting.   Genitourinary: Negative for dysuria.   Musculoskeletal: Negative for back pain.   Skin: Negative for rash.   Neurological: Positive for weakness (generalized) and numbness (left hand).   Psychiatric/Behavioral: Negative for confusion.       Physical Exam     Initial Vitals [07/15/18 1532]   BP Pulse Resp Temp SpO2   123/66 70 18 97.9 °F (36.6 °C) 100 %      MAP       --         Physical Exam    Nursing note and vitals reviewed.  Constitutional: He appears well-developed and well-nourished. He is not diaphoretic. No distress.   HENT:   Head: Normocephalic and atraumatic.   Mouth/Throat: Mucous membranes are dry.   Eyes: EOM are normal. Pupils are equal, round, and reactive to light.   Neck: Normal range of motion. Neck supple.   Cardiovascular: Normal rate, regular rhythm, normal heart sounds and intact distal pulses. Exam reveals no gallop and no friction rub.    No murmur heard.  Pulmonary/Chest: Breath sounds normal. No respiratory distress. He has no wheezes. He has no rhonchi. He has no rales.   Abdominal: Soft. Bowel sounds are normal. There is no tenderness. There is no rebound and no guarding.   Musculoskeletal: Normal range of motion.   Neurological: He is alert and oriented to person, place, and time.   Generalized weakness   Skin: Skin is warm.   Psychiatric: He has a normal mood and affect. His behavior is normal. Judgment and thought content normal.         ED Course   Critical Care  Performed by: JHOAN OWENS  Authorized by: JHOAN OWENS   Direct patient critical care time: 14  minutes  Additional history critical care time: 12 minutes  Ordering / reviewing critical care time: 10 minutes  Documentation critical care time: 8 minutes  Consulting other physicians critical care time: 5 minutes  Consult with family critical care time: 10 minutes  Total critical care time (exclusive of procedural time) : 59 minutes  Critical care was time spent personally by me on the following activities: ordering and review of radiographic studies, ordering and performing treatments and interventions, examination of patient, development of treatment plan with patient or surrogate, ordering and review of laboratory studies and obtaining history from patient or surrogate.        Labs Reviewed   CULTURE, BLOOD   CULTURE, BLOOD   CULTURE, URINE   CBC W/ AUTO DIFFERENTIAL   COMPREHENSIVE METABOLIC PANEL   LACTIC ACID, PLASMA   URINALYSIS          Imaging Results    None          Medical Decision Making:   History:   Old Medical Records: I decided to obtain old medical records.  Initial Assessment:   78-year-old male presented with a chief complaint of weakness and falls.  Differential Diagnosis:   Differential diagnosis includes, but is not limited to dehydration, electrolyte abnormality, PNA, and UTI.   Clinical Tests:   Lab Tests: Ordered and Reviewed  Radiological Study: Ordered and Reviewed  ED Management:  The patient was emergently evaluated in the emergency department, his evaluation was significant for an elderly male with dry mucous membranes and generalized weakness.  The patient's x-ray shows no acute abnormalities per my independent interpretation.  The patient's CT scan of his head and cervical spine showed no acute processes per Radiology.  The patient's labs were significant for dehydration, without evidence of infection.  The patient was aggressively treated with IV fluids in the emergency department.  I believe the patient will require further IV fluid rehydration.  I will admit him to the  hospitalist service for further care.  I have discussed the case with the hospitalist on call, Dr. Mccurdy.  He has accepted the patient for admission.            Scribe Attestation:   Scribe #1: I performed the above scribed service and the documentation accurately describes the services I performed. I attest to the accuracy of the note.           I, Dr. Eugene Abdullahi, personally performed the services described in this documentation. All medical record entries made by the scribe were at my direction and in my presence.  I have reviewed the chart and agree that the record reflects my personal performance and is accurate and complete. Eugene Abdullahi MD.  6:27 PM 07/15/2018       Clinical Impression:   The encounter diagnosis was Dehydration.                             Eugene Abdullahi MD  07/15/18 5529

## 2018-07-16 ENCOUNTER — TELEPHONE (OUTPATIENT)
Dept: FAMILY MEDICINE | Facility: CLINIC | Age: 78
End: 2018-07-16

## 2018-07-16 VITALS
OXYGEN SATURATION: 97 % | HEIGHT: 66 IN | RESPIRATION RATE: 12 BRPM | DIASTOLIC BLOOD PRESSURE: 94 MMHG | HEART RATE: 79 BPM | SYSTOLIC BLOOD PRESSURE: 157 MMHG | WEIGHT: 128 LBS | BODY MASS INDEX: 20.57 KG/M2 | TEMPERATURE: 98 F

## 2018-07-16 PROBLEM — I63.9 CVA (CEREBRAL VASCULAR ACCIDENT): Status: ACTIVE | Noted: 2018-07-15

## 2018-07-16 LAB
ALBUMIN SERPL BCP-MCNC: 3.4 G/DL
ALP SERPL-CCNC: 86 U/L
ALT SERPL W/O P-5'-P-CCNC: 16 U/L
ANION GAP SERPL CALC-SCNC: 9 MMOL/L
ANION GAP SERPL CALC-SCNC: 9 MMOL/L
AST SERPL-CCNC: 42 U/L
BASOPHILS # BLD AUTO: 0 K/UL
BASOPHILS # BLD AUTO: 0 K/UL
BASOPHILS NFR BLD: 0.3 %
BASOPHILS NFR BLD: 0.3 %
BILIRUB SERPL-MCNC: 1.1 MG/DL
BUN SERPL-MCNC: 20 MG/DL
BUN SERPL-MCNC: 20 MG/DL
CALCIUM SERPL-MCNC: 9.1 MG/DL
CALCIUM SERPL-MCNC: 9.1 MG/DL
CHLORIDE SERPL-SCNC: 110 MMOL/L
CHLORIDE SERPL-SCNC: 110 MMOL/L
CHOLEST SERPL-MCNC: 161 MG/DL
CHOLEST/HDLC SERPL: 3 {RATIO}
CO2 SERPL-SCNC: 22 MMOL/L
CO2 SERPL-SCNC: 22 MMOL/L
CREAT SERPL-MCNC: 1 MG/DL
CREAT SERPL-MCNC: 1 MG/DL
DIFFERENTIAL METHOD: ABNORMAL
DIFFERENTIAL METHOD: ABNORMAL
EOSINOPHIL # BLD AUTO: 0 K/UL
EOSINOPHIL # BLD AUTO: 0 K/UL
EOSINOPHIL NFR BLD: 0.8 %
EOSINOPHIL NFR BLD: 0.8 %
ERYTHROCYTE [DISTWIDTH] IN BLOOD BY AUTOMATED COUNT: 13.3 %
ERYTHROCYTE [DISTWIDTH] IN BLOOD BY AUTOMATED COUNT: 13.3 %
EST. GFR  (AFRICAN AMERICAN): >60 ML/MIN/1.73 M^2
EST. GFR  (AFRICAN AMERICAN): >60 ML/MIN/1.73 M^2
EST. GFR  (NON AFRICAN AMERICAN): >60 ML/MIN/1.73 M^2
EST. GFR  (NON AFRICAN AMERICAN): >60 ML/MIN/1.73 M^2
GLUCOSE SERPL-MCNC: 124 MG/DL
GLUCOSE SERPL-MCNC: 124 MG/DL
HCT VFR BLD AUTO: 40.8 %
HCT VFR BLD AUTO: 40.8 %
HDLC SERPL-MCNC: 53 MG/DL
HDLC SERPL: 32.9 %
HGB BLD-MCNC: 13.6 G/DL
HGB BLD-MCNC: 13.6 G/DL
LDLC SERPL CALC-MCNC: 92 MG/DL
LYMPHOCYTES # BLD AUTO: 1.2 K/UL
LYMPHOCYTES # BLD AUTO: 1.2 K/UL
LYMPHOCYTES NFR BLD: 17.8 %
LYMPHOCYTES NFR BLD: 17.8 %
MAGNESIUM SERPL-MCNC: 2 MG/DL
MCH RBC QN AUTO: 31.1 PG
MCH RBC QN AUTO: 31.1 PG
MCHC RBC AUTO-ENTMCNC: 33.3 G/DL
MCHC RBC AUTO-ENTMCNC: 33.3 G/DL
MCV RBC AUTO: 93 FL
MCV RBC AUTO: 93 FL
MONOCYTES # BLD AUTO: 0.7 K/UL
MONOCYTES # BLD AUTO: 0.7 K/UL
MONOCYTES NFR BLD: 10.2 %
MONOCYTES NFR BLD: 10.2 %
NEUTROPHILS # BLD AUTO: 4.7 K/UL
NEUTROPHILS # BLD AUTO: 4.7 K/UL
NEUTROPHILS NFR BLD: 70.9 %
NEUTROPHILS NFR BLD: 70.9 %
NONHDLC SERPL-MCNC: 108 MG/DL
PHOSPHATE SERPL-MCNC: 3.8 MG/DL
PLATELET # BLD AUTO: 139 K/UL
PLATELET # BLD AUTO: 139 K/UL
PMV BLD AUTO: 9 FL
PMV BLD AUTO: 9 FL
POTASSIUM SERPL-SCNC: 4.4 MMOL/L
POTASSIUM SERPL-SCNC: 4.4 MMOL/L
PROT SERPL-MCNC: 6 G/DL
RBC # BLD AUTO: 4.37 M/UL
RBC # BLD AUTO: 4.37 M/UL
SODIUM SERPL-SCNC: 141 MMOL/L
SODIUM SERPL-SCNC: 141 MMOL/L
TRIGL SERPL-MCNC: 80 MG/DL
WBC # BLD AUTO: 6.6 K/UL
WBC # BLD AUTO: 6.6 K/UL

## 2018-07-16 PROCEDURE — 25000003 PHARM REV CODE 250: Performed by: NURSE PRACTITIONER

## 2018-07-16 PROCEDURE — 85025 COMPLETE CBC W/AUTO DIFF WBC: CPT

## 2018-07-16 PROCEDURE — G8987 SELF CARE CURRENT STATUS: HCPCS | Mod: CL

## 2018-07-16 PROCEDURE — 99219 PR INITIAL OBSERVATION CARE,LEVL II: CPT | Mod: ,,, | Performed by: PSYCHIATRY & NEUROLOGY

## 2018-07-16 PROCEDURE — 97530 THERAPEUTIC ACTIVITIES: CPT

## 2018-07-16 PROCEDURE — 97162 PT EVAL MOD COMPLEX 30 MIN: CPT

## 2018-07-16 PROCEDURE — 36415 COLL VENOUS BLD VENIPUNCTURE: CPT

## 2018-07-16 PROCEDURE — 84100 ASSAY OF PHOSPHORUS: CPT

## 2018-07-16 PROCEDURE — 96366 THER/PROPH/DIAG IV INF ADDON: CPT

## 2018-07-16 PROCEDURE — 96360 HYDRATION IV INFUSION INIT: CPT

## 2018-07-16 PROCEDURE — 94761 N-INVAS EAR/PLS OXIMETRY MLT: CPT

## 2018-07-16 PROCEDURE — 97167 OT EVAL HIGH COMPLEX 60 MIN: CPT

## 2018-07-16 PROCEDURE — 80061 LIPID PANEL: CPT

## 2018-07-16 PROCEDURE — G0378 HOSPITAL OBSERVATION PER HR: HCPCS

## 2018-07-16 PROCEDURE — 80053 COMPREHEN METABOLIC PANEL: CPT

## 2018-07-16 PROCEDURE — G8979 MOBILITY GOAL STATUS: HCPCS | Mod: CK

## 2018-07-16 PROCEDURE — G8988 SELF CARE GOAL STATUS: HCPCS | Mod: CL

## 2018-07-16 PROCEDURE — 97535 SELF CARE MNGMENT TRAINING: CPT

## 2018-07-16 PROCEDURE — 96361 HYDRATE IV INFUSION ADD-ON: CPT

## 2018-07-16 PROCEDURE — 83735 ASSAY OF MAGNESIUM: CPT

## 2018-07-16 PROCEDURE — 96372 THER/PROPH/DIAG INJ SC/IM: CPT

## 2018-07-16 PROCEDURE — G8978 MOBILITY CURRENT STATUS: HCPCS | Mod: CL

## 2018-07-16 PROCEDURE — 97110 THERAPEUTIC EXERCISES: CPT

## 2018-07-16 RX ORDER — ASPIRIN 81 MG/1
81 TABLET ORAL DAILY
Status: DISCONTINUED | OUTPATIENT
Start: 2018-07-16 | End: 2018-07-16 | Stop reason: HOSPADM

## 2018-07-16 RX ADMIN — CARBIDOPA AND LEVODOPA 1 TABLET: 25; 100 TABLET ORAL at 09:07

## 2018-07-16 RX ADMIN — SODIUM CHLORIDE: 0.9 INJECTION, SOLUTION INTRAVENOUS at 09:07

## 2018-07-16 RX ADMIN — OXYCODONE AND ACETAMINOPHEN 1 TABLET: 5; 325 TABLET ORAL at 05:07

## 2018-07-16 RX ADMIN — CARBIDOPA AND LEVODOPA 1 TABLET: 25; 100 TABLET ORAL at 02:07

## 2018-07-16 NOTE — PLAN OF CARE
"Problem: Physical Therapy Goal  Goal: Physical Therapy Goal  Goals to be met by: 08/10/2018     Patient will increase functional independence with mobility by performin. Supine to sit with MInimal Assistance  2. Sit to supine with MInimal Assistance  3. Sit to stand transfer with Minimal Assistance  4. Bed to chair transfer with Minimal Assistance using Rolling Walker  5. Gait  x 75 feet with Minimal Assistance using Rolling Walker.     Outcome: Ongoing (interventions implemented as appropriate)  PT eval and tx completed this am. Pt was able to tolerate bed mobility, transfers and gait with RW x 2ft along bedside mod A with RW. Gait interrupted by increased pain in L knee, but pt refused imaging when offered by nurse as he is "afraid of the possible side effects." Pt demonstrated very flexed posture, resting tremors (RUE more affected than LUE), decreased  strength on L. Noted that in recent fall he tried to use drawer to stand and rested L axilla on drawer for prolonged time resulting in numbness and decreased ability to utilize LUE. Nursing took orthostatic measurements during session and found no decrease in BP. Pt was left supine in bed w/ needs in reach and lines intact; nursing notified.       "

## 2018-07-16 NOTE — PLAN OF CARE
07/16/18 1558   Final Note   Assessment Type Final Discharge Note   Discharge Disposition Home-Health  ([facility])   Hospital Follow Up  Appt(s) scheduled? Yes  (on the AVS)

## 2018-07-16 NOTE — HPI
"Toni Kamara is a 78 y.o. male with PMHx significant for Parkinson's disease, insomnia, and BPH.  He was admitted to the service of hospital medicine with dehydration and weakness.  He presented to the ED s/p fall shortly PTA with complaint of generalized weakness with associated dry-mouth. Pt states he slipped getting out of bed and was on the ground for a few hours. Per spouse, he has had 8-9 falls during the last week. He had a similar set of falls 4 months ago and he was diagnosed with a UTI. His spouse is concerned he is dehydrated and has a UTI again. Pt was seen at [facility] yesterday s/p fall. He had a U/A and was cleared to go home. He did not have a UCx. Pt was given 1 tablet of oxycodone 3 hours ago. After his fall yesterday, his left arm was pinched between a cabinet door and he lost the use of his left hand. Per spouse, [facility] said this would improve, but it has not. Spouse also endorses constipation despite use of laxatives which is an ongoing problem, but does endorse a "partial" BM yesterday.  He states he uses his walker "sometimes".  He is followed by neurology for his Parkinson's. He denies any LOC, visual deficits, urinary changes, chest pain or SOB.  He continues with LEFT arm weakness and "numbness".  Other pertinent medical history as below:  "

## 2018-07-16 NOTE — ASSESSMENT & PLAN NOTE
Suspect 2/2 Parkinson disease and non-compliance with walker.  Polypharmacy could be factor as he is on benzos and opiates.  No evidence of infectious process.  Maintain fall precautions and consult PT/OT.

## 2018-07-16 NOTE — ASSESSMENT & PLAN NOTE
"Patient attributes to injury a few days ago.  Discussed additional neuroimaging to evaluate for possible intracranial etiology, however he refuses MRI because of "side effects".  This is noted in neurology notes with previous recommendations for MRI, as well. Consult PT/OT.     "

## 2018-07-16 NOTE — PT/OT/SLP EVAL
Physical Therapy Evaluation    Patient Name:  Toni Kamara   MRN:  [id number]    Recommendations:     Discharge Recommendations:  nursing facility, skilled   Discharge Equipment Recommendations: none   Barriers to discharge: None    Assessment:     Toni Kamara is a 78 y.o. male admitted with a medical diagnosis of CVA (cerebral vascular accident).  He presents with the following impairments/functional limitations:  impaired endurance, weakness, impaired functional mobilty, gait instability, impaired balance, decreased coordination, decreased safety awareness, pain, decreased ROM which limits safe functional mobility and increases risk of falls.    Rehab Prognosis:  GOOD; patient would benefit from acute skilled PT services to address these deficits and reach maximum level of function.      Recent Surgery: * No surgery found *      Plan:     During this hospitalization, patient to be seen 6 x/week to address the above listed problems via gait training, therapeutic activities, therapeutic exercises  · Plan of Care Expires:  08/10/18   Plan of Care Reviewed with: patient    Subjective     Communicated with nurse Glasgow prior to session.  Patient found supine in bed upon PT entry to room, agreeable to evaluation.      Chief Complaint: hungry, pain in L knee  Patient comments/goals: to go home  Pain/Comfort:  · Pain Rating 1: 7/10  · Location - Side 1: Left  · Location 1: knee  · Pain Addressed 1: Distraction, Nurse notified    Patients cultural, spiritual, Shinto conflicts given the current situation:      Living Environment:  Pt lives with wife, with threshold step to enter home.   Prior to admission, patients asserts that he was independent at home with RW.  Patient has the following equipment: walker, rolling. Upon discharge, unsure if patient will have assistance from wife/family but he would benefit from continued PT services to address Parkinson's related deficits and improve mobility in  home/community.    Objective:     Patient found with: peripheral IV, telemetry     General Precautions: Standard, fall   Orthopedic Precautions:N/A   Braces: N/A     Exams:  · RLE ROM: 25-50% of normal active range  · RLE Strength: 3+/5  · LLE ROM: 25-50% of normal active range  · LLE Strength: 3+/5    Functional Mobility:  · Bed Mobility:     · Supine to Sit: moderate assistance  · Sit to Supine: moderate assistance  · Transfers:     · Sit to Stand:  moderate assistance with rolling walker  · Bed to Chair: moderate assistance with  rolling walker  using  Stand Pivot  · Gait: 2ft mod A with RW along edge of bed    AM-PAC 6 CLICK MOBILITY  Total Score:11       Therapeutic Activities and Exercises:   tolerated sitting EOB x 5-7 mins    Patient left supine with all lines intact, call button in reach and nursing notified.    GOALS:    Physical Therapy Goals        Problem: Physical Therapy Goal    Goal Priority Disciplines Outcome Goal Variances Interventions   Physical Therapy Goal     PT/OT, PT Ongoing (interventions implemented as appropriate)     Description:  Goals to be met by: 08/10/2018     Patient will increase functional independence with mobility by performin. Supine to sit with MInimal Assistance  2. Sit to supine with MInimal Assistance  3. Sit to stand transfer with Minimal Assistance  4. Bed to chair transfer with Minimal Assistance using Rolling Walker  5. Gait  x 75 feet with Minimal Assistance using Rolling Walker.                       History:     Past Medical History:   Diagnosis Date    Anxiety 2012    Arthritis of knee 2012    Back pain 2012    BPH (benign prostatic hyperplasia) 2012    Essential tremor 3/19/2014    Kyphoscoliosis 2012       Past Surgical History:   Procedure Laterality Date    APPENDECTOMY      FRACTURE SURGERY Left Dec 18, 2014    HEMORRHOID SURGERY      PROSTATE SURGERY      TURP and had renal insu from obsr    TONSILLECTOMY          Clinical Decision Making:     History  Co-morbidities and personal factors that may impact the plan of care Examination  Body Structures and Functions, activity limitations and participation restrictions that may impact the plan of care Clinical Presentation   Decision Making/ Complexity Score   Co-morbidities:   [] Time since onset of injury / illness / exacerbation  [] Status of current condition  []Patient's cognitive status and safety concerns    [] Multiple Medical Problems (see med hx)  Personal Factors:   [] Patient's age  [] Prior Level of function   [] Patient's home situation (environment and family support)  [] Patient's level of motivation  [] Expected progression of patient      HISTORY:(criteria)    [] 35585 - no personal factors/history    [] 65627 - has 1-2 personal factor/comorbidity     [] 93731 - has >3 personal factor/comorbidity     Body Regions:  [] Objective examination findings  [] Head     []  Neck  [] Trunk   [] Upper Extremity  [] Lower Extremity    Body Systems:  [] For communication ability, affect, cognition, language, and learning style: the assessment of the ability to make needs known, consciousness, orientation (person, place, and time), expected emotional /behavioral responses, and learning preferences (eg, learning barriers, education  needs)  [] For the neuromuscular system: a general assessment of gross coordinated movement (eg, balance, gait, locomotion, transfers, and transitions) and motor function  (motor control and motor learning)  [] For the musculoskeletal system: the assessment of gross symmetry, gross range of motion, gross strength, height, and weight  [] For the integumentary system: the assessment of pliability(texture), presence of scar formation, skin color, and skin integrity  [] For cardiovascular/pulmonary system: the assessment of heart rate, respiratory rate, blood pressure, and edema     Activity limitations:    [] Patient's cognitive status and saf  ety concerns          [] Status of current condition      [] Weight bearing restriction  [] Cardiopulmunary Restriction    Participation Restrictions:   [] Goals and goal agreement with the patient     [] Rehab potential (prognosis) and probable outcome      Examination of Body System: (criteria)    [] 18418 - addressing 1-2 elements    [] 54196 - addressing a total of 3 or more elements     [] 89210 -  Addressing a total of 4 or more elements         Clinical Presentation: (criteria)  Choose one     On examination of body system using standardized tests and measures patient presents with (CHOOSE ONE) elements from any of the following: body structures and functions, activity limitations, and/or participation restrictions.  Leading to a clinical presentation that is considered (CHOOSE ONE)                              Clinical Decision Making  (Eval Complexity):  Choose One     Time Tracking:     PT Received On: 07/16/18  PT Start Time: 1010     PT Stop Time: 1032  PT Total Time (min): 22 min     Billable Minutes: Evaluation 11 and Therapeutic Activity 11      Jessica Pond, PT  07/16/2018

## 2018-07-16 NOTE — ASSESSMENT & PLAN NOTE
His parkinson disease appears poorly controlled. He needs to continue to work with his neurologist for better control. Recommend PT/OT as well.

## 2018-07-16 NOTE — PROGRESS NOTES
[facility] sent patient information to [facility] for authorization and acceptance of home health services through [facility] care system.TAIWO Sandoval    Per Maria Antonia with [facility], home health agency is [facility].TAIWO sandoval

## 2018-07-16 NOTE — ASSESSMENT & PLAN NOTE
Progressive debilitation likely related to his Parkinson's disease.  Consult PT/OT to evaluate and treat.  Encouraged use of assistive devices.  Maintain fall precautions.

## 2018-07-16 NOTE — ASSESSMENT & PLAN NOTE
"Patient attributes to injury a few days ago.  Discussed additional neuroimaging to evaluate for possible intracranial etiology, however he refuses MRI because of "side effects".  This is noted in neurology notes with previous recommendations for MRI, as well. Consult PT/OT.   Add ST.   Patient refusing MRI. Discussed at length possible CVA    "

## 2018-07-16 NOTE — PLAN OF CARE
Met with pt to initiate his assessment and called pt's wife charley [phone] to complete the assessment.  Pt, who is independent with his self care, lives with his wife.  Pt has a 3 in 1 commode, rolling walker and tub transfer bench at home and denies current home health services.  Pt's PCP is Dr. Stout and insurance is [facility].  Pt's discharge disposition is home with home health.  Pt's wife stated that she had requested a neurology consult and requested to know if it was being done.  Updated Tresa JEFFERSON.     11:50 a.m. - verbal consent for the disclosure form for home health.  Wife stated pt previously had [facility].  She also stated that she has hired sitters to assist with care.

## 2018-07-16 NOTE — PLAN OF CARE
Problem: Occupational Therapy Goal  Goal: Occupational Therapy Goal  Goals to be met by: 7/23/18     Patient will increase functional independence with ADLs by performing:    Feeding with Set-up Assistance, Contact Guard Assistance and Assistive Devices as needed.  UE Dressing with Set-up Assistance and Minimal Assistance.  Sitting at edge of bed x5 minutes with Stand-by Assistance.  Supine to sit with Moderate Assistance of 2 persons and use of bedrail as needed.  Upper extremity exercise program x10 reps as instructed, with assistance as needed.    Outcome: Ongoing (interventions implemented as appropriate)  OT evaluation completed today. Goals & care plan established.    CONSTANTINE Jolly  7/16/2018

## 2018-07-16 NOTE — SUBJECTIVE & OBJECTIVE
Past Medical History:   Diagnosis Date    Anxiety 1/25/2012    Arthritis of knee 1/25/2012    Back pain 1/25/2012    BPH (benign prostatic hyperplasia) 1/25/2012    Essential tremor 3/19/2014    Kyphoscoliosis 1/25/2012       Past Surgical History:   Procedure Laterality Date    APPENDECTOMY      FRACTURE SURGERY Left Dec 18, 2014    HEMORRHOID SURGERY      PROSTATE SURGERY  2008    TURP and had renal insu from obsr    TONSILLECTOMY         Review of patient's allergies indicates:  No Known Allergies      No current facility-administered medications on file prior to encounter.      Current Outpatient Prescriptions on File Prior to Encounter   Medication Sig    carbidopa-levodopa  mg (SINEMET)  mg per tablet     carbidopa-levodopa  mg (SINEMET)  mg per tablet Take 1 tablet by mouth 3 (three) times daily.     clonazePAM (KLONOPIN) 2 MG Tab TAKE ONE TABLET BY MOUTH ONCE DAILY AT BEDTIME AS NEEDED    losartan (COZAAR) 50 MG tablet Take 1 tablet (50 mg total) by mouth once daily.    oxycodone-acetaminophen (PERCOCET) 5-325 mg per tablet Take 1 tablet by mouth once daily.      Family History     Problem Relation (Age of Onset)    Heart disease Mother (79)    Parkinsonism Father (83)        Social History Main Topics    Smoking status: Never Smoker    Smokeless tobacco: Never Used    Alcohol use No    Drug use: No    Sexual activity: Not on file     Review of Systems Comprehensive review of systems is negative except as mentioned in the HPI.   Objective:     Vital Signs (Most Recent):  Temp: 97.6 °F (36.4 °C) (07/16/18 1214)  Pulse: 79 (07/16/18 1214)  Resp: 12 (07/16/18 1214)  BP: (!) 157/94 (07/16/18 1214)  SpO2: 97 % (07/16/18 1214) Vital Signs (24h Range):  Temp:  [96.3 °F (35.7 °C)-98 °F (36.7 °C)] 97.6 °F (36.4 °C)  Pulse:  [61-85] 79  Resp:  [12-18] 12  SpO2:  [95 %-100 %] 97 %  BP: (123-183)/(66-94) 157/94     Weight: 58.1 kg (128 lb)  Body mass index is 20.66  kg/m².    Physical Exam        General: Well developed, well nourished.  No acute distress.  HEENT: Atraumatic, normocephalic.  Musculoskeletal: No obvious joint deformities, moves all extremities well.  Skin: No obvious rashes    Neurological Exam:  Mental status: Awake, alert. Recent and remote memory appear to be intact.   Speech/Language: Dysarthria, hypophonia  Cranial nerves: Pupils equal round and reactive to light, extraocular movements intact, facial strength and sensation intact bilaterally, palate and tongue midline, hearing grossly intact bilaterally.  Motor: 5/5 strength in right arm, 3/5 throughout in all muscle groups in left arm, dorsiflexion foot weakness in left leg but states this is due to patellar pain  Sensation: Absent to vibration and pinprick in left arm  DTR: could not relax due to rest tremor  Coordination: Bilateral UE resting tremor    Significant Labs: All pertinent lab results from the past 24 hours have been reviewed.    Significant Imaging: I have reviewed and interpreted all pertinent imaging results/findings within the past 24 hours.

## 2018-07-16 NOTE — H&P
"[facility] Hospital Medicine  History & Physical    Patient Name: Toni Kamara  MRN: [id number]  Admission Date: 7/15/2018  Attending Physician: Dominic Mccurdy MD   Primary Care Provider: Harinder Stout MD         Patient information was obtained from patient, past medical records and ER records.     Subjective:     Principal Problem:Dehydration    Chief Complaint:   Chief Complaint   Patient presents with    Weakness     hx. UTI    Fall        HPI: Toni Kamara is a 78 y.o. male with PMHx significant for Parkinson's disease, insomnia, and BPH.  He was admitted to the service of hospital medicine with dehydration and weakness.  He presented to the ED s/p fall shortly PTA with complaint of generalized weakness with associated dry-mouth. Pt states he slipped getting out of bed and was on the ground for a few hours. Per spouse, he has had 8-9 falls during the last week. He had a similar set of falls 4 months ago and he was diagnosed with a UTI. His spouse is concerned he is dehydrated and has a UTI again. Pt was seen at [facility] yesterday s/p fall. He had a U/A and was cleared to go home. He did not have a UCx. Pt was given 1 tablet of oxycodone 3 hours ago. After his fall yesterday, his left arm was pinched between a cabinet door and he lost the use of his left hand. Per spouse, [facility] said this would improve, but it has not. Spouse also endorses constipation despite use of laxatives which is an ongoing problem, but does endorse a "partial" BM yesterday.  He states he uses his walker "sometimes".  He is followed by neurology for his Parkinson's. He denies any LOC, visual deficits, urinary changes, chest pain or SOB.  He continues with LEFT arm weakness and "numbness".  Other pertinent medical history as below:    Past Medical History:   Diagnosis Date    Anxiety 1/25/2012    Arthritis of knee 1/25/2012    Back pain 1/25/2012    BPH (benign prostatic hyperplasia) 1/25/2012    Essential tremor 3/19/2014 "    Kyphoscoliosis 1/25/2012       Past Surgical History:   Procedure Laterality Date    APPENDECTOMY      FRACTURE SURGERY Left Dec 18, 2014    HEMORRHOID SURGERY      PROSTATE SURGERY  2008    TURP and had renal insu from ob[facility]    TONSILLECTOMY         Review of patient's allergies indicates:  No Known Allergies    No current facility-administered medications on file prior to encounter.      Current Outpatient Prescriptions on File Prior to Encounter   Medication Sig    carbidopa-levodopa  mg (SINEMET)  mg per tablet     carbidopa-levodopa  mg (SINEMET)  mg per tablet Take 1 tablet by mouth 3 (three) times daily.     clonazePAM (KLONOPIN) 2 MG Tab TAKE ONE TABLET BY MOUTH ONCE DAILY AT BEDTIME AS NEEDED    losartan (COZAAR) 50 MG tablet Take 1 tablet (50 mg total) by mouth once daily.    oxycodone-acetaminophen (PERCOCET) 5-325 mg per tablet Take 1 tablet by mouth once daily.     [DISCONTINUED] amoxicillin-clavulanate 500-125mg (AUGMENTIN) 500-125 mg Tab Take 1 tablet by mouth 2 (two) times daily.    [DISCONTINUED] carbidopa-levodopa-entacapone (STALEVO) -200 mg per tablet Take 1 tablet by mouth 2 (two) times daily.     Family History     Problem Relation (Age of Onset)    Heart disease Mother (79)    Parkinsonism Father (83)        Social History Main Topics    Smoking status: Never Smoker    Smokeless tobacco: Never Used    Alcohol use No    Drug use: No    Sexual activity: Not on file     Review of Systems   Constitutional: Positive for activity change and fatigue. Negative for appetite change, chills and fever.   HENT: Negative for congestion, postnasal drip, sinus pressure, sore throat and trouble swallowing.    Eyes: Negative for photophobia and visual disturbance.   Respiratory: Negative for cough, shortness of breath and wheezing.    Cardiovascular: Negative for chest pain, palpitations and leg swelling.   Gastrointestinal: Positive for constipation (last BM  last night). Negative for abdominal pain, diarrhea, nausea and vomiting.   Genitourinary: Negative for difficulty urinating, dysuria, flank pain, frequency and hematuria.   Musculoskeletal: Positive for back pain and gait problem. Negative for myalgias, neck pain and neck stiffness.   Skin: Negative for color change.   Neurological: Positive for tremors, syncope, weakness and numbness (Left arm). Negative for dizziness and light-headedness.   Psychiatric/Behavioral: Positive for sleep disturbance. Negative for confusion. The patient is not nervous/anxious.      Objective:     Vital Signs (Most Recent):  Temp: 98 °F (36.7 °C) (07/15/18 2013)  Pulse: 74 (07/15/18 2013)  Resp: 16 (07/15/18 2013)  BP: (!) 160/74 (07/15/18 2013)  SpO2: 97 % (07/15/18 2013) Vital Signs (24h Range):  Temp:  [97.9 °F (36.6 °C)-98 °F (36.7 °C)] 98 °F (36.7 °C)  Pulse:  [70-74] 74  Resp:  [16-18] 16  SpO2:  [97 %-100 %] 97 %  BP: (123-160)/(66-74) 160/74     Weight: 58.1 kg (128 lb)  Body mass index is 20.66 kg/m².    Physical Exam   Constitutional: He appears well-developed. No distress.   Frail, elderly.    HENT:   Head: Normocephalic and atraumatic.   Eyes: Conjunctivae and EOM are normal. Pupils are equal, round, and reactive to light.   Neck: Normal range of motion. Neck supple. No thyromegaly present.   Cardiovascular: Normal rate, regular rhythm, normal heart sounds and intact distal pulses.    Pulmonary/Chest: Effort normal and breath sounds normal. No respiratory distress.   Abdominal: Soft. Bowel sounds are normal. He exhibits no distension.   Musculoskeletal: Normal range of motion. He exhibits no edema or tenderness.   Neurological: He is alert. No cranial nerve deficit.   Tremor noted.  Hand grasp LEFT hand 2/5.  RIGHT 5/5.  Does attempt to lift arms to command, equal effort bilaterally.  Minimally moves legs off the bed with 4/5 dorsi and plantar flexion bilaterally.  No facial droop.   Skin: Skin is warm and dry. Capillary  refill takes less than 2 seconds. No erythema.   Psychiatric: He has a normal mood and affect. His behavior is normal. Judgment and thought content normal.         CRANIAL NERVES     CN III, IV, VI   Pupils are equal, round, and reactive to light.  Extraocular motions are normal.        Significant Labs:   CBC:   Recent Labs  Lab 07/15/18  1646   WBC 6.20   HGB 13.8*   HCT 41.9        CMP:   Recent Labs  Lab 07/15/18  1646      K 4.0      CO2 27   GLU 92   BUN 27*   CREATININE 1.3   CALCIUM 9.8   PROT 6.8   ALBUMIN 4.0   BILITOT 1.3*   ALKPHOS 92   AST 50*   ALT 12   ANIONGAP 10   EGFRNONAA 53*     Coagulation: No results for input(s): PT, INR, APTT in the last 48 hours.  Lactic Acid:   Recent Labs  Lab 07/15/18  1646   LACTATE 1.3     Urine Studies:   Recent Labs  Lab 07/15/18  1640   COLORU Yellow   APPEARANCEUA Clear   PHUR 6.0   SPECGRAV 1.025   PROTEINUA Negative   GLUCUA Negative   KETONESU 1+*   BILIRUBINUA Negative   OCCULTUA Negative   NITRITE Negative   UROBILINOGEN Negative   LEUKOCYTESUR Negative       Significant Imaging:  CT Head: Mild involutional changes and findings consistent with mild microvascular ischemic change with no acute intracranial findings.  Mild asymmetry of the CSF space over the convexity of doubtful significance and likely representing mild asymmetric involutional change as discussed above.    CT C Spine: Rather marked accentuation of the usual lordosis of the cervical spine.  Degenerative change.  Mild retrolisthesis C3-C4 most likely on a degenerative basis.  No acute compression or fracture.    Assessment/Plan:     * Dehydration    IV hydration, follow electrolyte panel.  Encourage optimal oral intake.          Parkinson disease    Continue sinemet-- not sure if he is compliant with prescribed regimen as he tells me he is undergoing medication changes and his report of what he is taking is different than what is in chart. Maintain fall precautions.  Consult  "PT/OT.  Discussed neurology consultation-- patient would prefer to f/u with Dr. Britton (his neurologist) upon discharge.           Left arm weakness    Patient attributes to injury a few days ago.  Discussed additional neuroimaging to evaluate for possible intracranial etiology, however he refuses MRI because of "side effects".  This is noted in neurology notes with previous recommendations for MRI, as well. Consult PT/OT.           Debility    Progressive debilitation likely related to his Parkinson's disease.  Consult PT/OT to evaluate and treat.  Encouraged use of assistive devices.  Maintain fall precautions.           Frequent falls    Suspect 2/2 Parkinson disease and non-compliance with walker.  Polypharmacy could be factor as he is on benzos and opiates.  No evidence of infectious process.  Maintain fall precautions and consult PT/OT.           Essential hypertension    Chronic, stable.  Continue oral antihypertensives, monitor BP closely, and titrate medication as required for sustained BP control.          Psychophysiological insomnia    Chronic issue, continue clonazepam nightly as per outpatient regimen.            VTE Risk Mitigation         Ordered     enoxaparin injection 40 mg  Daily      07/15/18 2039     Place sequential compression device  Until discontinued      07/15/18 2039     Place ANDARDE hose  Until discontinued      07/15/18 2039     IP VTE HIGH RISK PATIENT  Once      07/15/18 2039             Catie Carter NP  Department of Hospital Medicine   [facility]  "

## 2018-07-16 NOTE — ASSESSMENT & PLAN NOTE
This is unclear if stroke or if brachial plexus injury/compression or vascular compromise from fall. He is refusing MRI to clarify. I recommend MRI brain and c-spine and possible MRI brachial plexus. He wishes to defer and follow up outpatient. In the meantime, he will be started on aspirin daily for coverage.

## 2018-07-16 NOTE — PLAN OF CARE
07/16/18 1144   Discharge Assessment   Assessment Type Discharge Planning Reassessment   Confirmed/corrected address and phone number on facesheet? Yes   Assessment information obtained from? Patient;Other  (Pt and pt's wife Maria Guadalupe by phone. )   Communicated expected length of stay with patient/caregiver yes   Prior to hospitilization cognitive status: Alert/Oriented   Prior to hospitalization functional status: Independent   Current cognitive status: Alert/Oriented   Current Functional Status: Independent   Lives With spouse   Able to Return to Prior Arrangements yes   Is patient able to care for self after discharge? Yes   Who are your caregiver(s) and their phone number(s)? (wife Maria Guadalupe Kamara [phone])   Patient's perception of discharge disposition home health   Readmission Within The Last 30 Days no previous admission in last 30 days   Patient currently being followed by outpatient case management? No   Patient currently receives any other outside agency services? No   Equipment Currently Used at Home 3-in-1 commode;walker, rolling;other (see comments)  (tub transfer bench)   Do you have any problems affording any of your prescribed medications? No  (pharmacy is [facility]'s)   Is the patient taking medications as prescribed? yes   Does the patient have transportation home? Yes   Discharge Plan A Home Health   Patient/Family In Agreement With Plan yes

## 2018-07-16 NOTE — ASSESSMENT & PLAN NOTE
Chronic, stable.  Continue oral antihypertensives, monitor BP closely, and titrate medication as required for sustained BP control.

## 2018-07-16 NOTE — PLAN OF CARE
Problem: Patient Care Overview  Goal: Plan of Care Review  Outcome: Ongoing (interventions implemented as appropriate)  Pt on room air with 95% sats

## 2018-07-16 NOTE — PROGRESS NOTES
"[facility] Hospital Medicine  Progress Note    Patient Name: Toni Kamara  MRN: [id number]  Patient Class: OP- Observation   Admission Date: 7/15/2018  Length of Stay: 0 days  Attending Physician: Dominic Mccurdy MD  Primary Care Provider: Harinder Stout MD        Subjective:     Principal Problem:Dehydration    HPI:  Toni Kamara is a 78 y.o. male with PMHx significant for Parkinson's disease, insomnia, and BPH.  He was admitted to the service of hospital medicine with dehydration and weakness.  He presented to the ED s/p fall shortly PTA with complaint of generalized weakness with associated dry-mouth. Pt states he slipped getting out of bed and was on the ground for a few hours. Per spouse, he has had 8-9 falls during the last week. He had a similar set of falls 4 months ago and he was diagnosed with a UTI. His spouse is concerned he is dehydrated and has a UTI again. Pt was seen at [facility] yesterday s/p fall. He had a U/A and was cleared to go home. He did not have a UCx. Pt was given 1 tablet of oxycodone 3 hours ago. After his fall yesterday, his left arm was pinched between a cabinet door and he lost the use of his left hand. Per spouse, [facility] said this would improve, but it has not. Spouse also endorses constipation despite use of laxatives which is an ongoing problem, but does endorse a "partial" BM yesterday.  He states he uses his walker "sometimes".  He is followed by neurology for his Parkinson's. He denies any LOC, visual deficits, urinary changes, chest pain or SOB.  He continues with LEFT arm weakness and "numbness".  Other pertinent medical history as below:    Hospital Course:  No notes on file    Interval History: no new issues. Still with decreased left arm numbness and weakness per patient.   Discussed with OT. Patient with moderate assistance with standing. ~9-10 falls this week.     Patient refusing MRI. Discussed possible stroke.     Review of Systems   Constitutional: Negative for " appetite change, chills and diaphoresis.   HENT: Positive for trouble swallowing. Negative for congestion, hearing loss and sore throat.    Respiratory: Negative for cough, shortness of breath and wheezing.    Cardiovascular: Negative for chest pain, palpitations and leg swelling.   Gastrointestinal: Positive for abdominal pain. Negative for blood in stool and nausea.   Musculoskeletal: Positive for back pain and neck pain. Negative for arthralgias and myalgias.   Skin:        Bruising to upper ext   Neurological: Positive for tremors, weakness and numbness (left arm). Negative for dizziness, syncope and light-headedness.   Psychiatric/Behavioral: Negative for agitation and confusion. The patient is not nervous/anxious.      Objective:     Vital Signs (Most Recent):  Temp: 96.3 °F (35.7 °C) (07/16/18 0803)  Pulse: 85 (07/16/18 1020)  Resp: 18 (07/16/18 0803)  BP: (!) 153/75 (07/16/18 1020)  SpO2: 95 % (07/16/18 0813) Vital Signs (24h Range):  Temp:  [96.3 °F (35.7 °C)-98 °F (36.7 °C)] 96.3 °F (35.7 °C)  Pulse:  [61-85] 85  Resp:  [16-18] 18  SpO2:  [95 %-100 %] 95 %  BP: (123-183)/(66-88) 153/75     Weight: 58.1 kg (128 lb)  Body mass index is 20.66 kg/m².    Intake/Output Summary (Last 24 hours) at 07/16/18 1154  Last data filed at 07/16/18 0949   Gross per 24 hour   Intake             1425 ml   Output              250 ml   Net             1175 ml      Physical Exam   Constitutional: He is oriented to person, place, and time. He appears well-developed and well-nourished.   HENT:   Head: Normocephalic and atraumatic.   Right Ear: External ear normal.   Left Ear: External ear normal.   Nose: Nose normal.   Mouth/Throat: Oropharynx is clear and moist.   Eyes: Conjunctivae and EOM are normal. Pupils are equal, round, and reactive to light.   Neck: Normal range of motion. Neck supple.   Cardiovascular: Normal rate, regular rhythm and intact distal pulses.    Murmur (ASM) heard.  Pulmonary/Chest: Effort normal and breath  "sounds normal.   Abdominal: Soft. Bowel sounds are normal.   Musculoskeletal: Normal range of motion.   Neurological: He is alert and oriented to person, place, and time. He exhibits abnormal muscle tone. Coordination abnormal.   Tremor bilaterally, + rigidity to bilateral upper ext. + left arm decreased sensation. Gait not assessed.    Skin: Skin is warm and dry.   Psychiatric: He has a normal mood and affect. His behavior is normal.   anxious   Nursing note and vitals reviewed.      Significant Labs:   BMP:   Recent Labs  Lab 07/16/18  0517   *  124*     141   K 4.4  4.4     110   CO2 22*  22*   BUN 20  20   CREATININE 1.0  1.0   CALCIUM 9.1  9.1   MG 2.0     CBC:   Recent Labs  Lab 07/15/18  1646 07/16/18  0517   WBC 6.20 6.60  6.60   HGB 13.8* 13.6*  13.6*   HCT 41.9 40.8  40.8    139*  139*       Significant Imaging: I have reviewed and interpreted all pertinent imaging results/findings within the past 24 hours.    Assessment/Plan:      * Dehydration    IV hydration, follow electrolyte panel.  Encourage optimal oral intake.          CVA (cerebral vascular accident)- possible    Patient attributes to injury a few days ago.  Discussed additional neuroimaging to evaluate for possible intracranial etiology, however he refuses MRI because of "side effects".  This is noted in neurology notes with previous recommendations for MRI, as well. Consult PT/OT.   Add ST.   Patient refusing MRI. Discussed at length possible CVA  Add ASA and check lipids.   Carotid US.        Debility    Progressive debilitation likely related to his Parkinson's disease.  Consult PT/OT to evaluate and treat.  Encouraged use of assistive devices.  Maintain fall precautions.           Frequent falls    Suspect 2/2 Parkinson disease and non-compliance with walker.  Polypharmacy could be factor as he is on benzos and opiates.  No evidence of infectious process.  Maintain fall precautions and consult PT/OT. "           Essential hypertension    Chronic, stable.  Continue oral antihypertensives, monitor BP closely, and titrate medication as required for sustained BP control.          Psychophysiological insomnia    Chronic issue, continue clonazepam nightly as per outpatient regimen.          Parkinson disease    Continue sinemet-- not sure if he is compliant with prescribed regimen as he tells me he is undergoing medication changes and his report of what he is taking is different than what is in chart. Maintain fall precautions.  Consult PT/OT.  Discussed neurology consultation. Known to Dr. Youssef at [facility] outpatient.           VTE Risk Mitigation         Ordered     enoxaparin injection 40 mg  Daily      07/15/18 2039     Place sequential compression device  Until discontinued      07/15/18 2039     Place ANDRADE hose  Until discontinued      07/15/18 2039     IP VTE HIGH RISK PATIENT  Once      07/15/18 2039        Discussed with wife. Wishes for Neurology evaluation. Will reorder HH.     Tresa Powell NP  Department of Hospital Medicine   [facility]

## 2018-07-16 NOTE — NURSING
Discharge instructions went over with pt. Pt verbalizes understanding and has no new questions at this time. VSS. IV removed, catheter intact. Telemetry monitor removed. AVS printed and given to pt along with printed prescription. Pt left via wheelchair with staff.

## 2018-07-16 NOTE — ASSESSMENT & PLAN NOTE
Continue sinemet-- not sure if he is compliant with prescribed regimen as he tells me he is undergoing medication changes and his report of what he is taking is different than what is in chart. Maintain fall precautions.  Consult PT/OT.  Discussed neurology consultation. Known to Dr. Youssef at [facility] outpatient.

## 2018-07-16 NOTE — SUBJECTIVE & OBJECTIVE
Past Medical History:   Diagnosis Date    Anxiety 1/25/2012    Arthritis of knee 1/25/2012    Back pain 1/25/2012    BPH (benign prostatic hyperplasia) 1/25/2012    Essential tremor 3/19/2014    Kyphoscoliosis 1/25/2012       Past Surgical History:   Procedure Laterality Date    APPENDECTOMY      FRACTURE SURGERY Left Dec 18, 2014    HEMORRHOID SURGERY      PROSTATE SURGERY  2008    TURP and had renal insu from obsr    TONSILLECTOMY         Review of patient's allergies indicates:  No Known Allergies    No current facility-administered medications on file prior to encounter.      Current Outpatient Prescriptions on File Prior to Encounter   Medication Sig    carbidopa-levodopa  mg (SINEMET)  mg per tablet     carbidopa-levodopa  mg (SINEMET)  mg per tablet Take 1 tablet by mouth 3 (three) times daily.     clonazePAM (KLONOPIN) 2 MG Tab TAKE ONE TABLET BY MOUTH ONCE DAILY AT BEDTIME AS NEEDED    losartan (COZAAR) 50 MG tablet Take 1 tablet (50 mg total) by mouth once daily.    oxycodone-acetaminophen (PERCOCET) 5-325 mg per tablet Take 1 tablet by mouth once daily.     [DISCONTINUED] amoxicillin-clavulanate 500-125mg (AUGMENTIN) 500-125 mg Tab Take 1 tablet by mouth 2 (two) times daily.    [DISCONTINUED] carbidopa-levodopa-entacapone (STALEVO) -200 mg per tablet Take 1 tablet by mouth 2 (two) times daily.     Family History     Problem Relation (Age of Onset)    Heart disease Mother (79)    Parkinsonism Father (83)        Social History Main Topics    Smoking status: Never Smoker    Smokeless tobacco: Never Used    Alcohol use No    Drug use: No    Sexual activity: Not on file     Review of Systems   Constitutional: Positive for activity change and fatigue. Negative for appetite change, chills and fever.   HENT: Negative for congestion, postnasal drip, sinus pressure, sore throat and trouble swallowing.    Eyes: Negative for photophobia and visual disturbance.    Respiratory: Negative for cough, shortness of breath and wheezing.    Cardiovascular: Negative for chest pain, palpitations and leg swelling.   Gastrointestinal: Positive for constipation (last BM last night). Negative for abdominal pain, diarrhea, nausea and vomiting.   Genitourinary: Negative for difficulty urinating, dysuria, flank pain, frequency and hematuria.   Musculoskeletal: Positive for back pain and gait problem. Negative for myalgias, neck pain and neck stiffness.   Skin: Negative for color change.   Neurological: Positive for tremors, syncope, weakness and numbness (Left arm). Negative for dizziness and light-headedness.   Psychiatric/Behavioral: Positive for sleep disturbance. Negative for confusion. The patient is not nervous/anxious.      Objective:     Vital Signs (Most Recent):  Temp: 98 °F (36.7 °C) (07/15/18 2013)  Pulse: 74 (07/15/18 2013)  Resp: 16 (07/15/18 2013)  BP: (!) 160/74 (07/15/18 2013)  SpO2: 97 % (07/15/18 2013) Vital Signs (24h Range):  Temp:  [97.9 °F (36.6 °C)-98 °F (36.7 °C)] 98 °F (36.7 °C)  Pulse:  [70-74] 74  Resp:  [16-18] 16  SpO2:  [97 %-100 %] 97 %  BP: (123-160)/(66-74) 160/74     Weight: 58.1 kg (128 lb)  Body mass index is 20.66 kg/m².    Physical Exam   Constitutional: He appears well-developed. No distress.   Frail, elderly.    HENT:   Head: Normocephalic and atraumatic.   Eyes: Conjunctivae and EOM are normal. Pupils are equal, round, and reactive to light.   Neck: Normal range of motion. Neck supple. No thyromegaly present.   Cardiovascular: Normal rate, regular rhythm, normal heart sounds and intact distal pulses.    Pulmonary/Chest: Effort normal and breath sounds normal. No respiratory distress.   Abdominal: Soft. Bowel sounds are normal. He exhibits no distension.   Musculoskeletal: Normal range of motion. He exhibits no edema or tenderness.   Neurological: He is alert. No cranial nerve deficit.   Tremor noted.  Hand grasp LEFT hand 2/5.  RIGHT 5/5.  Does  attempt to lift arms to command, equal effort bilaterally.  Minimally moves legs off the bed with 4/5 dorsi and plantar flexion bilaterally.  No facial droop.   Skin: Skin is warm and dry. Capillary refill takes less than 2 seconds. No erythema.   Psychiatric: He has a normal mood and affect. His behavior is normal. Judgment and thought content normal.         CRANIAL NERVES     CN III, IV, VI   Pupils are equal, round, and reactive to light.  Extraocular motions are normal.        Significant Labs:   CBC:   Recent Labs  Lab 07/15/18  1646   WBC 6.20   HGB 13.8*   HCT 41.9        CMP:   Recent Labs  Lab 07/15/18  1646      K 4.0      CO2 27   GLU 92   BUN 27*   CREATININE 1.3   CALCIUM 9.8   PROT 6.8   ALBUMIN 4.0   BILITOT 1.3*   ALKPHOS 92   AST 50*   ALT 12   ANIONGAP 10   EGFRNONAA 53*     Coagulation: No results for input(s): PT, INR, APTT in the last 48 hours.  Lactic Acid:   Recent Labs  Lab 07/15/18  1646   LACTATE 1.3     Urine Studies:   Recent Labs  Lab 07/15/18  1640   COLORU Yellow   APPEARANCEUA Clear   PHUR 6.0   SPECGRAV 1.025   PROTEINUA Negative   GLUCUA Negative   KETONESU 1+*   BILIRUBINUA Negative   OCCULTUA Negative   NITRITE Negative   UROBILINOGEN Negative   LEUKOCYTESUR Negative       Significant Imaging:  CT Head: Mild involutional changes and findings consistent with mild microvascular ischemic change with no acute intracranial findings.  Mild asymmetry of the CSF space over the convexity of doubtful significance and likely representing mild asymmetric involutional change as discussed above.    CT C Spine: Rather marked accentuation of the usual lordosis of the cervical spine.  Degenerative change.  Mild retrolisthesis C3-C4 most likely on a degenerative basis.  No acute compression or fracture.

## 2018-07-16 NOTE — PLAN OF CARE
Problem: SLP Goal  Goal: SLP Goal  Outcome: Ongoing (interventions implemented as appropriate)  Complete cognitive-linguistic evaluation

## 2018-07-16 NOTE — ASSESSMENT & PLAN NOTE
Continue sinemet-- not sure if he is compliant with prescribed regimen as he tells me he is undergoing medication changes and his report of what he is taking is different than what is in chart. Maintain fall precautions.  Consult PT/OT.  Discussed neurology consultation-- patient would prefer to f/u with Dr. Britton (his neurologist) upon discharge.

## 2018-07-16 NOTE — HPI
Mr. Kamara is a 78 year old man with parkinson disease followed by Dr. Youssef who presents with left arm weakness and numbness. He initially refused testing and evaluation but I have been asked to see him regarding the left arm weakness and numbness.     He states that last Wednesday, about 5 days ago, is when this began, however, in the record it states this occurred yesterday. He fell and his arm was pinned and resting on a cabinet door under his armpit for 10-15 min. He then had onset of arm numbness and weakness. He denies any speech changes, leg or face numbness or weakness. No right sided symptoms. He feels his weakness has improved today. He is not on aspirin at home. He is refusing MRI or other testing. He wants to go home and follow up with Dr. Youssef.

## 2018-07-16 NOTE — PT/OT/SLP EVAL
"Speech Language Pathology Evaluation  Bedside Swallow    Patient Name:  Toni Kamara   MRN:  [id number]  Admitting Diagnosis: CVA (cerebral vascular accident)    Recommendations:                 General Recommendations:  Cognitive-linguistic evaluation  Diet recommendations:  Dental Soft, Thin   Aspiration Precautions: Standard aspiration precautions   General Precautions: Standard, fall  Communication strategies:  none    History:     Past Medical History:   Diagnosis Date    Anxiety 1/25/2012    Arthritis of knee 1/25/2012    Back pain 1/25/2012    BPH (benign prostatic hyperplasia) 1/25/2012    Essential tremor 3/19/2014    Kyphoscoliosis 1/25/2012       Past Surgical History:   Procedure Laterality Date    APPENDECTOMY      FRACTURE SURGERY Left Dec 18, 2014    HEMORRHOID SURGERY      PROSTATE SURGERY  2008    TURP and had renal insu from obsr    TONSILLECTOMY         Social History: Patient lives with family.    Prior Intubation HX:  None this admit     Modified Barium Swallow: None in EPIC    Chest X-Rays: "No acute cardiopulmonary disease." 7/15/18    Prior diet: Regular and thin.    Subjective     I dont eat some things but I just woke up when I had medicine this morning    Objective:     Oral Musculature Evaluation  · Oral Musculature: WFL  · Dentition: present and adequate  · Secretion Management: adequate  · Mucosal Quality: dry  · Mandibular Strength and Mobility: WFL  · Oral Labial Strength and Mobility: WFL  · Lingual Strength and Mobility: WFL  · Velar Elevation: WFL  · Buccal Strength and Mobility: WFL    Bedside Swallow Eval:   Consistencies Assessed:  · Thin liquids via cup, straw, serial straw swallow  · Puree via spoon applesauce  · Soft solids self-fed, small piece orange  · Solids muffin, self fed     Oral Phase:   · WFL    Pharyngeal Phase:   · no overt clinical signs/symptoms of aspiration  · no overt clinical signs/symptoms of pharyngeal dysphagia    Compensatory " Strategies  · None    Treatment: Educ re impressions and recommendations. Questions answered.     Assessment:     Toni Kamara is a 78 y.o. male with no overt s/s of penetration or aspiration. REC dental soft with thin liquids, 2/2 dental status. Will follow-up to complete cognitive-linguistic evaluation per order.    Goals:    SLP Goals        Problem: SLP Goal    Goal Priority Disciplines Outcome   SLP Goal     SLP Ongoing (interventions implemented as appropriate)   Description:  Complete cognitive-linguistic evaluation                    Plan:     · Patient to be seen:      · Plan of Care expires:     · Plan of Care reviewed with:  patient   · SLP Follow-Up:  Yes       Discharge recommendations:      Barriers to Discharge:  None    Time Tracking:     SLP Treatment Date:   07/16/18  Speech Start Time:  1122  Speech Stop Time:  1147     Speech Total Time (min):  25 min    Billable Minutes: Eval Swallow and Oral Function 25    SHARYN Zee  07/16/2018

## 2018-07-16 NOTE — PT/OT/SLP EVAL
Occupational Therapy   Evaluation    Name: Toni Kamara  MRN: [id number]  Admitting Diagnosis:  CVA (cerebral vascular accident)      Recommendations:     Discharge Recommendations: nursing facility, skilled  Discharge Equipment Recommendations:  walker, rolling  Barriers to discharge:  Decreased caregiver support    History:     Occupational Profile:  Living Environment: Pt lives with his wife and other family members in a 2  with 1 DOMENICA and has a walk-in shower.  Previous level of function: Pt reported that he needed some assistance with self feeding and manipulating clothing fasteners but was Mod I with other ADL's and was driving and mowing his lawn a week and a half ago. He reported not using an assistive device for functional mobility.  Roles and Routines: , father, grandfather  Equipment Owned:  grab bar, rollator (built-in shower seat)  Assistance upon Discharge: Nursing staff will assist pt.    Past Medical History:   Diagnosis Date    Anxiety 1/25/2012    Arthritis of knee 1/25/2012    Back pain 1/25/2012    BPH (benign prostatic hyperplasia) 1/25/2012    Essential tremor 3/19/2014    Kyphoscoliosis 1/25/2012       Past Surgical History:   Procedure Laterality Date    APPENDECTOMY      FRACTURE SURGERY Left Dec 18, 2014    HEMORRHOID SURGERY      PROSTATE SURGERY  2008    TURP and had renal insu from obsr    TONSILLECTOMY         Subjective     Chief Complaint: L knee pain  Patient/Family stated goals: To put my clothes on and feed myself better.  Communicated with: nurse Myah prior to session.  Pain/Comfort:  · Pain Rating 1: 1/10  · Location - Side 1: Left  · Location 1: knee  · Pain Addressed 1: Reposition, Distraction, Cessation of Activity, Nurse notified  · Pain Rating Post-Intervention 1: 8/10    Patients cultural, spiritual, Holiness conflicts given the current situation:      Objective:     Patient found with: telemetry, peripheral IV    General Precautions: Standard, fall    Orthopedic Precautions:N/A   Braces: N/A     Occupational Performance:    Bed Mobility:    · Patient completed Rolling/Turning to Left with  maximal assistance and with side rail  · Patient completed Scooting/Bridging with maximal assistance and with side rail  · Patient completed Supine to Sit with maximal assistance, 2 persons and with side rail  · Patient completed Sit to Supine with maximal assistance, 2 persons and with side rail   · Cues needed for technique with all tasks as well as extra time due to difficulty with task initiation.    Functional Mobility/Transfers:  · Patient completed Sit <> Stand Transfer with maximal assistance and of 2 persons  with  rolling walker   · Functional Mobility: 3 side steps with walker and Mod A of 2 persons. Cues needed for technique with all tasks as well as extra time due to difficulty with task initiation.    Activities of Daily Living:  · Feeding:  OT repositioned pt to HOB in upright, midline sitting for self feeding & ed pt on safe positioning with self feeding to increase independence & reduce risk of aspiration with task.   · Pt took a sip of water from a small cup with minimum assistance to place cup in hand and steady it while pt brought it to his mouth. Pt was noted to cough after swallowing. Nurse notified and speech therapy recommended for swallow study.     · Grooming: minimum assistance and set-up to position comb in R hand to comb hair with HOB raised  · Lower Body Dressing: dependence to don/doff socks and brief  · Toileting: total assistance with pt wearing a brief    Cognitive/Visual Perceptual:  Cognitive/Psychosocial Skills:     -       Oriented to: Person, Place, Situation and month and year, not day   -       Follows Commands/attention:Follows one-step commands and with extra time  -       Communication: dysarthria and low volume 2/2 Parkinson's  -       Memory: Poor immediate recall  -       Safety awareness/insight to disability: impaired   -        Mood/Affect/Coping skills/emotional control: Appropriate to situation, Cooperative, Blunted and Pleasant  Visual/Perceptual:      -Intact    Physical Exam:  Balance:    -       Static sitting CGA; dynamic Min A; static standing Mod A; dynamic Mod A of 2  Postural examination/scapula alignment:    -       Rounded shoulders  -       Forward head  -       Posterior pelvic tilt  -       Abnormal trunk flexion  -       Kyphosis  Skin integrity: L hand pink  Edema:  Mild L hand and knee  Sensation:    -       Impaired  light/touch diminished L UE  Motor Planning:    -       Impaired  Dominant hand:    -       right  Upper Extremity Range of Motion:     -       Right Upper Extremity: WFL  -       Left Upper Extremity: 90* shoulder flexion and decreased wrist and finger flexion and extension; could only reach to his forehead  Upper Extremity Strength:    -       Right Upper Extremity: 4/5  -       Left Upper Extremity: shoulder & wrist 2+/5, elbow and forearm 3/5   Strength:    -       Right Upper Extremity: WFL  -       Left Upper Extremity: 2/5  Fine Motor Coordination:    -       Impaired  With R UE tremors    Gross motor coordination: Impaired with all mobility tasks    Patient left HOB elevated with all lines intact, call button in bhupinder and Myah, nurse notified    AMPA 6 Click:  [facility] Total Score: 11    Treatment & Education:  OT ed pt on OT role & POC as well as discharge recommendations including speech therapy referral.  OT ed pt on bed mobility techniques to increase independence with task.  OT ed pt on keeping HOB raised as pt will tolerate to counteract effects of bedrest.  OT ed pt on B shoulder flexion SROM exercise with R UE assisting L UE & pt completing 10 reps with cues and supervision.  OT ed patient on safety with walker use for functional mobility with cues for hand & foot placement & sequencing.   OT ed pt on fall risk and strongly advised pt to call for help for all OOB  "mobility.    Education:    Assessment:     Toni Kamara is a 78 y.o. male with a medical diagnosis of CVA (cerebral vascular accident).  He presents with a decline in functional status due to the below listed impairments, impacting ADLs and functional mobility. Pt needed Maximum assistance of 2 people for bed mobility and sit to stand transfers and needed increased assistance for all self care tasks. Pt is at high risk for more falls.  Performance deficits affecting function are weakness, impaired self care skills, impaired balance, impaired functional mobilty, impaired endurance, impaired coordination, pain, decreased lower extremity function, decreased upper extremity function, impaired sensation, gait instability, impaired cognition, impaired fine motor, edema, decreased ROM, decreased safety awareness.    Pt will benefit from further inpatient therapy to maximize return to prior level of function, due to pt currently needing extensive assistance for ADLs and functional mobility.      Rehab Prognosis:  Fair; patient would benefit from acute skilled OT services to address these deficits and reach maximum level of function.         Clinical Decision Making:     3.  OT High:  "Pt evaluation falls under high complexity for evaluation category due to 5+ performance deficits identified with comprehensive assessments and significant modifications/assistance required. An expanded review of history and occupational profile completed in addition to expanded review of physical, cognitive and psychosocial history. Several treatment options considered in care."     Plan:     Patient to be seen 3 x/week to address the above listed problems via self-care/home management, therapeutic activities, therapeutic exercises  · Plan of Care Expires:    · Plan of Care Reviewed with: patient    This Plan of care has been discussed with the patient who was involved in its development and understands and is in agreement with the identified " goals and treatment plan    GOALS:    Occupational Therapy Goals        Problem: Occupational Therapy Goal    Goal Priority Disciplines Outcome Interventions   Occupational Therapy Goal     OT, PT/OT Ongoing (interventions implemented as appropriate)    Description:  Goals to be met by: 7/23/18     Patient will increase functional independence with ADLs by performing:    Feeding with Set-up Assistance, Contact Guard Assistance and Assistive Devices as needed.  UE Dressing with Set-up Assistance and Minimal Assistance.  Sitting at edge of bed x5 minutes with Stand-by Assistance.  Supine to sit with Moderate Assistance of 2 persons and use of bedrail as needed.  Upper extremity exercise program x10 reps as instructed, with assistance as needed.                      Time Tracking:     OT Date of Treatment: 07/16/18  OT Start Time: 0833  OT Stop Time: 0936  OT Total Time (min): 63 min    Billable Minutes:Evaluation 15  Self Care/Home Management 14  Therapeutic Activity 20  Therapeutic Exercise 14    CONSTANTINE Simon  7/16/2018

## 2018-07-16 NOTE — PLAN OF CARE
Problem: Patient Care Overview  Goal: Plan of Care Review  Outcome: Ongoing (interventions implemented as appropriate)  Bed is in low position, call light is within reach, patient instructed to use call light for assistance.  Cardiac monitored, sinus arrhythmia on monitor.  PRN pain medication given.  No complaints at this time.  Patient is resting between care.

## 2018-07-16 NOTE — SUBJECTIVE & OBJECTIVE
Interval History: no new issues. Still with decreased left arm numbness and weakness per patient.   Discussed with OT. Patient with moderate assistance with standing. ~9-10 falls this week.     Patient refusing MRI. Discussed possible stroke.     Review of Systems   Constitutional: Negative for appetite change, chills and diaphoresis.   HENT: Positive for trouble swallowing. Negative for congestion, hearing loss and sore throat.    Respiratory: Negative for cough, shortness of breath and wheezing.    Cardiovascular: Negative for chest pain, palpitations and leg swelling.   Gastrointestinal: Positive for abdominal pain. Negative for blood in stool and nausea.   Musculoskeletal: Positive for back pain and neck pain. Negative for arthralgias and myalgias.   Skin:        Bruising to upper ext   Neurological: Positive for tremors, weakness and numbness (left arm). Negative for dizziness, syncope and light-headedness.   Psychiatric/Behavioral: Negative for agitation and confusion. The patient is not nervous/anxious.      Objective:     Vital Signs (Most Recent):  Temp: 96.3 °F (35.7 °C) (07/16/18 0803)  Pulse: 85 (07/16/18 1020)  Resp: 18 (07/16/18 0803)  BP: (!) 153/75 (07/16/18 1020)  SpO2: 95 % (07/16/18 0813) Vital Signs (24h Range):  Temp:  [96.3 °F (35.7 °C)-98 °F (36.7 °C)] 96.3 °F (35.7 °C)  Pulse:  [61-85] 85  Resp:  [16-18] 18  SpO2:  [95 %-100 %] 95 %  BP: (123-183)/(66-88) 153/75     Weight: 58.1 kg (128 lb)  Body mass index is 20.66 kg/m².    Intake/Output Summary (Last 24 hours) at 07/16/18 1154  Last data filed at 07/16/18 0949   Gross per 24 hour   Intake             1425 ml   Output              250 ml   Net             1175 ml      Physical Exam   Constitutional: He is oriented to person, place, and time. He appears well-developed and well-nourished.   HENT:   Head: Normocephalic and atraumatic.   Right Ear: External ear normal.   Left Ear: External ear normal.   Nose: Nose normal.   Mouth/Throat:  Oropharynx is clear and moist.   Eyes: Conjunctivae and EOM are normal. Pupils are equal, round, and reactive to light.   Neck: Normal range of motion. Neck supple.   Cardiovascular: Normal rate, regular rhythm and intact distal pulses.    Murmur (ASM) heard.  Pulmonary/Chest: Effort normal and breath sounds normal.   Abdominal: Soft. Bowel sounds are normal.   Musculoskeletal: Normal range of motion.   Neurological: He is alert and oriented to person, place, and time. He exhibits abnormal muscle tone. Coordination abnormal.   Tremor bilaterally, + rigidity to bilateral upper ext. + left arm decreased sensation. Gait not assessed.    Skin: Skin is warm and dry.   Psychiatric: He has a normal mood and affect. His behavior is normal.   anxious   Nursing note and vitals reviewed.      Significant Labs:   BMP:   Recent Labs  Lab 07/16/18  0517   *  124*     141   K 4.4  4.4     110   CO2 22*  22*   BUN 20  20   CREATININE 1.0  1.0   CALCIUM 9.1  9.1   MG 2.0     CBC:   Recent Labs  Lab 07/15/18  1646 07/16/18  0517   WBC 6.20 6.60  6.60   HGB 13.8* 13.6*  13.6*   HCT 41.9 40.8  40.8    139*  139*       Significant Imaging: I have reviewed and interpreted all pertinent imaging results/findings within the past 24 hours.

## 2018-07-16 NOTE — CONSULTS
[facility]  Neurology  Consult Note    Patient Name: Toni Kamara  MRN: [id number]  Admission Date: 7/15/2018  Hospital Length of Stay: 0 days  Code Status: Full Code   Attending Provider: Dominic Mccurdy MD   Consulting Provider: Angelika Goldsmith MD  Primary Care Physician: Harinder Stout MD  Principal Problem:CVA (cerebral vascular accident)    Consults   Subjective:     Chief Complaint:  stroke     HPI:   Mr. Kamara is a 78 year old man with parkinson disease followed by Dr. Youssef who presents with left arm weakness and numbness. He initially refused testing and evaluation but I have been asked to see him regarding the left arm weakness and numbness.     He states that last Wednesday, about 5 days ago, is when this began, however, in the record it states this occurred yesterday. He fell and his arm was pinned and resting on a cabinet door under his armpit for 10-15 min. He then had onset of arm numbness and weakness. He denies any speech changes, leg or face numbness or weakness. No right sided symptoms. He feels his weakness has improved today. He is not on aspirin at home. He is refusing MRI or other testing. He wants to go home and follow up with Dr. Youssef.          Past Medical History:   Diagnosis Date    Anxiety 1/25/2012    Arthritis of knee 1/25/2012    Back pain 1/25/2012    BPH (benign prostatic hyperplasia) 1/25/2012    Essential tremor 3/19/2014    Kyphoscoliosis 1/25/2012       Past Surgical History:   Procedure Laterality Date    APPENDECTOMY      FRACTURE SURGERY Left Dec 18, 2014    HEMORRHOID SURGERY      PROSTATE SURGERY  2008    TURP and had renal insu from obsr    TONSILLECTOMY         Review of patient's allergies indicates:  No Known Allergies      No current facility-administered medications on file prior to encounter.      Current Outpatient Prescriptions on File Prior to Encounter   Medication Sig    carbidopa-levodopa  mg (SINEMET)  mg per tablet      carbidopa-levodopa  mg (SINEMET)  mg per tablet Take 1 tablet by mouth 3 (three) times daily.     clonazePAM (KLONOPIN) 2 MG Tab TAKE ONE TABLET BY MOUTH ONCE DAILY AT BEDTIME AS NEEDED    losartan (COZAAR) 50 MG tablet Take 1 tablet (50 mg total) by mouth once daily.    oxycodone-acetaminophen (PERCOCET) 5-325 mg per tablet Take 1 tablet by mouth once daily.      Family History     Problem Relation (Age of Onset)    Heart disease Mother (79)    Parkinsonism Father (83)        Social History Main Topics    Smoking status: Never Smoker    Smokeless tobacco: Never Used    Alcohol use No    Drug use: No    Sexual activity: Not on file     Review of Systems Comprehensive review of systems is negative except as mentioned in the HPI.   Objective:     Vital Signs (Most Recent):  Temp: 97.6 °F (36.4 °C) (07/16/18 1214)  Pulse: 79 (07/16/18 1214)  Resp: 12 (07/16/18 1214)  BP: (!) 157/94 (07/16/18 1214)  SpO2: 97 % (07/16/18 1214) Vital Signs (24h Range):  Temp:  [96.3 °F (35.7 °C)-98 °F (36.7 °C)] 97.6 °F (36.4 °C)  Pulse:  [61-85] 79  Resp:  [12-18] 12  SpO2:  [95 %-100 %] 97 %  BP: (123-183)/(66-94) 157/94     Weight: 58.1 kg (128 lb)  Body mass index is 20.66 kg/m².    Physical Exam        General: Well developed, well nourished.  No acute distress.  HEENT: Atraumatic, normocephalic.  Musculoskeletal: No obvious joint deformities, moves all extremities well.  Skin: No obvious rashes    Neurological Exam:  Mental status: Awake, alert. Recent and remote memory appear to be intact.   Speech/Language: Dysarthria, hypophonia  Cranial nerves: Pupils equal round and reactive to light, extraocular movements intact, facial strength and sensation intact bilaterally, palate and tongue midline, hearing grossly intact bilaterally.  Motor: 5/5 strength in right arm, 3/5 throughout in all muscle groups in left arm, dorsiflexion foot weakness in left leg but states this is due to patellar pain  Sensation: Absent  to vibration and pinprick in left arm  DTR: could not relax due to rest tremor  Coordination: Bilateral UE resting tremor    Significant Labs: All pertinent lab results from the past 24 hours have been reviewed.    Significant Imaging: I have reviewed and interpreted all pertinent imaging results/findings within the past 24 hours.    Assessment and Plan:     * CVA (cerebral vascular accident)- possible    This is unclear if stroke or if brachial plexus injury/compression or vascular compromise from fall. He is refusing MRI to clarify. I recommend MRI brain and c-spine and possible MRI brachial plexus. He wishes to defer and follow up outpatient. In the meantime, he will be started on aspirin daily for coverage.         Frequent falls    His parkinson disease appears poorly controlled. He needs to continue to work with his neurologist for better control. Recommend PT/OT as well.         Parkinson disease    He states Dr. Youssef is working on medications. He will follow up outpatient for this            VTE Risk Mitigation         Ordered     enoxaparin injection 40 mg  Daily      07/15/18 2039     Place sequential compression device  Until discontinued      07/15/18 2039     Place ANDRADE hose  Until discontinued      07/15/18 2039     IP VTE HIGH RISK PATIENT  Once      07/15/18 2039          Thank you for your consult. I will sign off. Please contact us if you have any additional questions.    Angelika Goldsmith MD  Neurology  [facility]

## 2018-07-16 NOTE — TELEPHONE ENCOUNTER
----- Message from Meseret Nguyễn sent at 7/16/2018 10:47 AM CDT -----  Contact: Al[facility] calling to schedule a one week hospital follow up for patient, unable to schedule until 8/9/18, please call patient at home to schedule, [phone]. Thanks!

## 2018-07-17 ENCOUNTER — TELEPHONE (OUTPATIENT)
Dept: MEDSURG UNIT | Facility: HOSPITAL | Age: 78
End: 2018-07-17

## 2018-07-17 LAB — BACTERIA UR CULT: NO GROWTH

## 2018-07-17 NOTE — DISCHARGE SUMMARY
"[facility]  Hospital Medicine  Discharge Summary      Patient Name: Toni Kamara  MRN: [id number]  Admission Date: 7/15/2018  Hospital Length of Stay: 0 days  Discharge Date and Time: 7/16/2018  4:51 PM  Attending Physician: No att. providers found   Discharging Provider: Tresa Powell NP  Primary Care Provider: Harinder Stout MD      HPI:   Toni Kamara is a 78 y.o. male with PMHx significant for Parkinson's disease, insomnia, and BPH.  He was admitted to the service of hospital medicine with dehydration and weakness.  He presented to the ED s/p fall shortly PTA with complaint of generalized weakness with associated dry-mouth. Pt states he slipped getting out of bed and was on the ground for a few hours. Per spouse, he has had 8-9 falls during the last week. He had a similar set of falls 4 months ago and he was diagnosed with a UTI. His spouse is concerned he is dehydrated and has a UTI again. Pt was seen at [facility] yesterday s/p fall. He had a U/A and was cleared to go home. He did not have a UCx. Pt was given 1 tablet of oxycodone 3 hours ago. After his fall yesterday, his left arm was pinched between a cabinet door and he lost the use of his left hand. Per spouse, [facility] said this would improve, but it has not. Spouse also endorses constipation despite use of laxatives which is an ongoing problem, but does endorse a "partial" BM yesterday.  He states he uses his walker "sometimes".  He is followed by neurology for his Parkinson's. He denies any LOC, visual deficits, urinary changes, chest pain or SOB.  He continues with LEFT arm weakness and "numbness".  Other pertinent medical history as below:    * No surgery found *      Hospital Course:   Patient received PT/OT during observation stay. Patient refused MRI brain and any further diagnostic work up. Carotid US with Small amounts of atherosclerotic plaque bilaterally. He was initiated on ASA 81 mg daily. Neurology consulted for arm weakness and " numbness per wife request, however patient wishes to follow up with his Neurology, Dr. Youssef outpatient and continued to refuse further work up for possible CVA vs. Brachial plexopathy.      Consults:     No new Assessment & Plan notes have been filed under this hospital service since the last note was generated.  Service: Hospital Medicine    Final Active Diagnoses:    Diagnosis Date Noted POA    PRINCIPAL PROBLEM:  CVA (cerebral vascular accident)- possible [I63.9] 07/15/2018 Yes    Dehydration [E86.0] 07/15/2018 Yes    Frequent falls [R29.6] 07/15/2018 Not Applicable    Debility [R53.81] 07/15/2018 Yes    Essential hypertension [I10] 12/28/2017 Yes    Psychophysiological insomnia [F51.04] 08/04/2016 Yes    Parkinson disease [G20] 04/13/2015 Yes      Problems Resolved During this Admission:    Diagnosis Date Noted Date Resolved POA       Discharged Condition: stable    Disposition: Home or Self Care    Follow Up:  Follow-up Information     Harinder Stout MD In 1 week.    Specialty:  Family Medicine  Why:  hospital follow up - MD nurse to call pt to schedule  Contact information:  [address] [facility] [address]  [phone]             Juan José Sullivan MD On 7/23/2018.    Specialty:  Neurology  Why:  hospital follow up -7-23-18 @ 4 p.m.   Contact information:  [address]  [address]  [facility] [address]  [phone]             [facility].    Specialty:  Home Health Services  Contact information:  [address] [facility] [address]  [facility] [address]  [phone]                 Patient Instructions:     Ambulatory referral to Home Health   Referral Priority: Routine Referral Type: Home Health   Referral Reason: Specialty Services Required    Referred to Provider: [facility] Requested Specialty: Home Health Services   Number of Visits Requested: 1      Diet Cardiac     Notify your health care provider if you experience any of the following:  redness,  tenderness, or signs of infection (pain, swelling, redness, odor or green/yellow discharge around incision site)     Notify your health care provider if you experience any of the following:  severe uncontrolled pain     Notify your health care provider if you experience any of the following:  persistent dizziness, light-headedness, or visual disturbances     Activity as tolerated         Significant Diagnostic Studies: Labs:   BMP:   Recent Labs  Lab 07/15/18  1646 07/16/18  0517   GLU 92 124*  124*    141  141   K 4.0 4.4  4.4    110  110   CO2 27 22*  22*   BUN 27* 20  20   CREATININE 1.3 1.0  1.0   CALCIUM 9.8 9.1  9.1   MG  --  2.0    and CMP   Recent Labs  Lab 07/15/18  1646 07/16/18  0517    141  141   K 4.0 4.4  4.4    110  110   CO2 27 22*  22*   GLU 92 124*  124*   BUN 27* 20  20   CREATININE 1.3 1.0  1.0   CALCIUM 9.8 9.1  9.1   PROT 6.8 6.0   ALBUMIN 4.0 3.4*   BILITOT 1.3* 1.1*   ALKPHOS 92 86   AST 50* 42*   ALT 12 16   ANIONGAP 10 9  9   ESTGFRAFRICA >60 >60  >60   EGFRNONAA 53* >60  >60       Pending Diagnostic Studies:     None         Medications:  Reconciled Home Medications:      Medication List      START taking these medications    walker Misc  1 each by Misc.(Non-Drug; Combo Route) route continuous. Rolling walker        CONTINUE taking these medications    * carbidopa-levodopa  mg  mg per tablet  Commonly known as:  SINEMET  Take 1 tablet by mouth 3 (three) times daily.     * carbidopa-levodopa  mg  mg per tablet  Commonly known as:  SINEMET     clonazePAM 2 MG Tab  Commonly known as:  KLONOPIN  TAKE ONE TABLET BY MOUTH ONCE DAILY AT BEDTIME AS NEEDED     losartan 50 MG tablet  Commonly known as:  COZAAR  Take 1 tablet (50 mg total) by mouth once daily.     oxyCODONE-acetaminophen 5-325 mg per tablet  Commonly known as:  PERCOCET  Take 1 tablet by mouth once daily.        * This list has 2 medication(s) that are the same as  other medications prescribed for you. Read the directions carefully, and ask your doctor or other care provider to review them with you.            STOP taking these medications    amoxicillin-clavulanate 500-125mg 500-125 mg Tab  Commonly known as:  AUGMENTIN     carbidopa-levodopa-entacapone -200 mg per tablet  Commonly known as:  STALEVO            Indwelling Lines/Drains at time of discharge:   Lines/Drains/Airways          No matching active lines, drains, or airways          Time spent on the discharge of patient: 35 minutes  Patient was seen and examined on the date of discharge and determined to be suitable for discharge.         Tresa Powell NP  Department of Hospital Medicine  [facility]

## 2018-07-17 NOTE — HOSPITAL COURSE
Patient received PT/OT during observation stay. Patient refused MRI brain and any further diagnostic work up. Carotid US with Small amounts of atherosclerotic plaque bilaterally. He was initiated on ASA 81 mg daily. Neurology consulted for arm weakness and numbness per wife request, however patient wishes to follow up with his Neurology, Dr. Youssef outpatient and continued to refuse further work up for possible CVA vs. Brachial plexopathy.

## 2018-07-21 LAB
BACTERIA BLD CULT: NORMAL
BACTERIA BLD CULT: NORMAL

## 2018-07-25 RX ORDER — CLONAZEPAM 2 MG/1
TABLET ORAL
Qty: 90 TABLET | Refills: 0 | Status: SHIPPED | OUTPATIENT
Start: 2018-07-25 | End: 2018-09-17

## 2018-07-27 ENCOUNTER — OFFICE VISIT (OUTPATIENT)
Dept: FAMILY MEDICINE | Facility: CLINIC | Age: 78
End: 2018-07-27
Payer: MEDICARE

## 2018-07-27 VITALS
DIASTOLIC BLOOD PRESSURE: 64 MMHG | BODY MASS INDEX: 20.57 KG/M2 | SYSTOLIC BLOOD PRESSURE: 116 MMHG | HEART RATE: 80 BPM | HEIGHT: 66 IN | WEIGHT: 128 LBS

## 2018-07-27 DIAGNOSIS — R60.0 BILATERAL LOWER EXTREMITY EDEMA: ICD-10-CM

## 2018-07-27 DIAGNOSIS — Z86.39 HISTORY OF DEHYDRATION: ICD-10-CM

## 2018-07-27 DIAGNOSIS — R29.6 FREQUENT FALLS: ICD-10-CM

## 2018-07-27 DIAGNOSIS — R29.898 LEFT ARM WEAKNESS: Primary | ICD-10-CM

## 2018-07-27 PROCEDURE — 3074F SYST BP LT 130 MM HG: CPT | Mod: CPTII,S$GLB,, | Performed by: INTERNAL MEDICINE

## 2018-07-27 PROCEDURE — 3078F DIAST BP <80 MM HG: CPT | Mod: CPTII,S$GLB,, | Performed by: INTERNAL MEDICINE

## 2018-07-27 PROCEDURE — 99999 PR PBB SHADOW E&M-EST. PATIENT-LVL III: CPT | Mod: PBBFAC,,, | Performed by: INTERNAL MEDICINE

## 2018-07-27 PROCEDURE — 99214 OFFICE O/P EST MOD 30 MIN: CPT | Mod: S$GLB,,, | Performed by: INTERNAL MEDICINE

## 2018-07-27 RX ORDER — CARBIDOPA AND LEVODOPA 25; 100 MG/1; MG/1
1 TABLET ORAL
COMMUNITY
Start: 2018-05-21 | End: 2018-07-27 | Stop reason: SDUPTHER

## 2018-07-27 NOTE — PROGRESS NOTES
Assessment and Plan:    1. Left arm weakness  L arm weakness has resolved. Continues to have L hand weakness, however it is improving slowly.   No definitive answer for reason behind L hand weakness d/t patient refusing MRI at hospital visit. Suspected brachial plexus injury rather than central process.   HH with PT/OT are working with patient to increase strength and balance. Patient advised to discuss further with OT exercises for hand strength.   He is to f/u with his neurologist, Dr. Youssef, on 8/30/18    2. History of dehydration  Labs from hospital reviewed. BUN =20, Cr= 1.0 at time of discharge, electrolytes were stable.    Wife reports she has been adding electrolytes to his water, encouraging him to drink more water.   Increase fluid intake during the day. Avoid excess fluid intake 1-2 hours prior to sleep.     3. Frequent falls  History of frequent falls. Recent fall with mild rib pain, exam not concerning for fracture, patient declined XR.   Discussed patient safety, environmental safety with patient and wife.  Pt has HH with PT/OT to increase balance and strength.    4. Bilateral lower extremity edema  Unclear cause, has been evaluated previously by PCP. Echo reviewed, EF 55-60% 01/18.   Kidney function stable. Liver function stable. US previously with no clots.   Suspect venous stasis, worsened in April due to less mobility. Discussed the need to elevate lower extremities while sitting or lying, preferably above heart level while lying down.   Compression stockings can help reduce swelling as well.     Advised patient to f/u with Dr. Stout for routine medical care. We did discuss today that potentially weaning off of the clonzepam would help decrease risk for future falls.        ______________________________________________________________________  Subjective:    Chief Complaint:  hospital f/u    HPI:  Toni is a 78 y.o. year old male who is here for a hosp f/u after falling at home.  After this fall,  in which he had to place his underarm on a cabinet door) he notioced arm left arm and hand weakness, which prompted ER evalution. During hospital evaluation, there had been some concern for possible CVA, Neurology had recommended workup including MRI, but patient had refused this. The arm symptoms have resolved but the  left hand remains weak with some improvement of movement.  HH is working with him and he is receiving PT/OT/speech but they are mainly working with his walking and balance and not focusing on his left hand. He notes that he has only seen OT once since getting out of the hospital.    He fell last night (again) after getting up to use the restroom after slipping on a pillow case. He hit his head but denies loosing consciousness. He c/o left mid posterior rib pain. He rates the pain 1-2/10.  He denies SOB/CP.  He and his wife both report he takes 2 mg klonopin at night and then after he wakes up at night to urinate he takes his oxycodone.     He has developed redness to boy LE with swelling. The swelling has been present since April and has been evaluated previously by PCP.  The redness started last week. He c/o of itching to boy LE. He has been applying OTC lotion which relieves the itching.    Medications:  Current Outpatient Prescriptions on File Prior to Visit   Medication Sig Dispense Refill    carbidopa-levodopa  mg (SINEMET)  mg per tablet       carbidopa-levodopa  mg (SINEMET)  mg per tablet Take 1 tablet by mouth 3 (three) times daily.       clonazePAM (KLONOPIN) 2 MG Tab TAKE ONE TABLET BY MOUTH AT BEDTIME AS NEEDED 90 tablet 0    losartan (COZAAR) 50 MG tablet Take 1 tablet (50 mg total) by mouth once daily. 90 tablet 3    oxycodone-acetaminophen (PERCOCET) 5-325 mg per tablet Take 1 tablet by mouth once daily.       walker Misc 1 each by Misc.(Non-Drug; Combo Route) route continuous. Rolling walker  0     No current facility-administered medications on file  "prior to visit.        Review of Systems:  Review of Systems   Constitutional: Positive for activity change. Negative for appetite change, chills and fever.   Respiratory: Negative for cough and shortness of breath.    Cardiovascular: Positive for leg swelling. Negative for chest pain.   Gastrointestinal: Negative for abdominal pain and blood in stool.   Genitourinary: Positive for difficulty urinating and frequency. Negative for dysuria and hematuria.   Musculoskeletal: Positive for back pain.   Neurological: Positive for weakness (brice LE) and numbness (L hand). Negative for dizziness.       Past Medical History:  Past Medical History:   Diagnosis Date    Anxiety 1/25/2012    Arthritis of knee 1/25/2012    Back pain 1/25/2012    BPH (benign prostatic hyperplasia) 1/25/2012    Essential tremor 3/19/2014    Kyphoscoliosis 1/25/2012       Objective:    Vitals:  Vitals:    07/27/18 1540   BP: 116/64   Pulse: 80   Weight: 58.1 kg (128 lb)   Height: 5' 6" (1.676 m)   PainSc:   6   PainLoc: Rib Cage       Physical Exam   Constitutional: He is oriented to person, place, and time. He appears well-developed and well-nourished. No distress.   HENT:   Head: Normocephalic and atraumatic.   Eyes: EOM are normal. Pupils are equal, round, and reactive to light.   Neck: Normal range of motion.   Cardiovascular: Regular rhythm.    No murmur heard.  Pulmonary/Chest: Effort normal and breath sounds normal. No respiratory distress. He has no wheezes.         He exhibits no tenderness.   Musculoskeletal: He exhibits edema (+1 pitting edema to brice shins below knee).   Neurological: He is alert and oriented to person, place, and time.   weak  strength left hand; resting tremor right hand   Skin: Skin is warm and dry. Capillary refill takes less than 2 seconds. There is erythema (Brice LE).   bilateral LEs have some mild erythema to mid shin   Psychiatric: He has a normal mood and affect. His behavior is normal.       Data:  Labs " reviewed from hospital visit: BUN 20, Cr 1.0, electrolytes WNL.  carotid U/S-- No evidence of a hemodynamically significant carotid bifurcation stenosis.  Small amounts of atherosclerotic plaque bilaterally    CT cervical spine--Rather marked accentuation of the usual lordosis of the cervical spine.  Degenerative change.  Mild retrolisthesis C3-C4 most likely on a degenerative basis.  No acute compression or fracture.     CT head--Mild involutional changes and findings consistent with mild microvascular ischemic change with no acute intracranial findings.  Mild asymmetry of the CSF space over the convexity of doubtful significance and likely representing mild asymmetric involutional change as discussed above.    Urine culture and blood cultures-- No growth at 5 days,      Rosalva Barr MD  Internal Medicine

## 2018-07-29 ENCOUNTER — HOSPITAL ENCOUNTER (EMERGENCY)
Facility: HOSPITAL | Age: 78
Discharge: HOME | End: 2018-07-29
Attending: EMERGENCY MEDICINE
Payer: MEDICARE

## 2018-07-29 VITALS
SYSTOLIC BLOOD PRESSURE: 154 MMHG | RESPIRATION RATE: 17 BRPM | BODY MASS INDEX: 20.09 KG/M2 | OXYGEN SATURATION: 99 % | WEIGHT: 125 LBS | DIASTOLIC BLOOD PRESSURE: 75 MMHG | TEMPERATURE: 98 F | HEART RATE: 63 BPM | HEIGHT: 66 IN

## 2018-07-29 DIAGNOSIS — R07.89 CHEST DISCOMFORT: ICD-10-CM

## 2018-07-29 DIAGNOSIS — R00.2 PALPITATIONS: ICD-10-CM

## 2018-07-29 LAB
ALBUMIN SERPL BCP-MCNC: 3.6 G/DL
ALP SERPL-CCNC: 92 U/L
ALT SERPL W/O P-5'-P-CCNC: 6 U/L
ANION GAP SERPL CALC-SCNC: 7 MMOL/L
AST SERPL-CCNC: 20 U/L
BASOPHILS # BLD AUTO: 0 K/UL
BASOPHILS NFR BLD: 0.5 %
BILIRUB SERPL-MCNC: 0.6 MG/DL
BUN SERPL-MCNC: 30 MG/DL
CALCIUM SERPL-MCNC: 9.4 MG/DL
CHLORIDE SERPL-SCNC: 108 MMOL/L
CO2 SERPL-SCNC: 26 MMOL/L
CREAT SERPL-MCNC: 1.2 MG/DL
DIFFERENTIAL METHOD: ABNORMAL
EOSINOPHIL # BLD AUTO: 0.1 K/UL
EOSINOPHIL NFR BLD: 2.6 %
ERYTHROCYTE [DISTWIDTH] IN BLOOD BY AUTOMATED COUNT: 12.9 %
EST. GFR  (AFRICAN AMERICAN): >60 ML/MIN/1.73 M^2
EST. GFR  (NON AFRICAN AMERICAN): 58 ML/MIN/1.73 M^2
GLUCOSE SERPL-MCNC: 96 MG/DL
HCT VFR BLD AUTO: 37.6 %
HGB BLD-MCNC: 12.3 G/DL
LYMPHOCYTES # BLD AUTO: 1.4 K/UL
LYMPHOCYTES NFR BLD: 25.8 %
MCH RBC QN AUTO: 30.4 PG
MCHC RBC AUTO-ENTMCNC: 32.7 G/DL
MCV RBC AUTO: 93 FL
MONOCYTES # BLD AUTO: 0.5 K/UL
MONOCYTES NFR BLD: 8.8 %
NEUTROPHILS # BLD AUTO: 3.4 K/UL
NEUTROPHILS NFR BLD: 62.3 %
PLATELET # BLD AUTO: 217 K/UL
PMV BLD AUTO: 7.7 FL
POTASSIUM SERPL-SCNC: 3.9 MMOL/L
PROT SERPL-MCNC: 6.7 G/DL
RBC # BLD AUTO: 4.05 M/UL
SODIUM SERPL-SCNC: 141 MMOL/L
TROPONIN I SERPL DL<=0.01 NG/ML-MCNC: <0.006 NG/ML
WBC # BLD AUTO: 5.5 K/UL

## 2018-07-29 PROCEDURE — 93005 ELECTROCARDIOGRAM TRACING: CPT

## 2018-07-29 PROCEDURE — 80053 COMPREHEN METABOLIC PANEL: CPT

## 2018-07-29 PROCEDURE — 84484 ASSAY OF TROPONIN QUANT: CPT

## 2018-07-29 PROCEDURE — 99284 EMERGENCY DEPT VISIT MOD MDM: CPT

## 2018-07-29 PROCEDURE — 36415 COLL VENOUS BLD VENIPUNCTURE: CPT

## 2018-07-29 PROCEDURE — 85025 COMPLETE CBC W/AUTO DIFF WBC: CPT

## 2018-07-29 PROCEDURE — 93010 ELECTROCARDIOGRAM REPORT: CPT | Mod: ,,, | Performed by: INTERNAL MEDICINE

## 2018-07-29 NOTE — ED NOTES
"Assumed care:  Patient awake, alert and oriented x 3, skin warm and dry, in NAD.    Patient identifiers for Toni Kamara checked and correct.  LOC:  Patient is awake, alert, and aware of environment with an appropriate affect. Patient is oriented x 3 and speaking appropriately.  APPEARANCE:  Patient resting comfortably and in no acute distress. Patient is clean and well groomed, patient's clothing is properly fastened.  SKIN:  The skin is warm and dry. Patient has normal skin turgor and moist mucus membranes. Skin is intact; no bruising or breakdown noted.  MUSCULOSKELETAL:  Patient is moving all extremities well, no obvious deformities noted. Pulses intact.  Weak.  Right hand does not close.  RESPIRATORY:  Airway is open and patent. Respirations are spontaneous and non-labored with normal effort and rate.  CARDIAC:  Patient has a normal rate and rhythm. No peripheral edema noted. Capillary refill < 3 seconds.  ABDOMEN:  No distention noted.  Soft and non-tender upon palpation.  NEUROLOGICAL:  PERRL. Facial expression is symmetrical. Normal sensation in all extremities when touched with finger.  Tremors.  Allergies reported:  Review of patient's allergies indicates:  No Known Allergies  OTHER NOTES:  Patient states that he felt a mid sternal "jolt" x 5.  Patient states that it just didn't feel right.  Denies CP, SOB, and nausea.  "

## 2018-07-29 NOTE — ED PROVIDER NOTES
"Encounter Date: 7/29/2018    SCRIBE #1 NOTE: I, Justino Robert, am scribing for, and in the presence of, Dr. Rasmussen.       History     Chief Complaint   Patient presents with    Chest Pain     Awakened by a "jolt" in chest today -- denies @ present. S/S similar approx 1 year ago (not investigated as they were too infrequent)     07/29/2018 9:27 AM    The pt is a 78 y.o. male arriving via EMS with a past medical history of Back pain, BPH, Essential tremor, and Kyphoscoliosis presenting to the ED with a sudden onset of an acute chest pain beginning 1 hr ago. The pt reported current symptoms are similar to previous chest pains. He stated he was sleeping in the middle of the night when he felt an intermittent sudden "jolt" causing him to wake up. Associated symptoms of palpitations and sleep disturbances. The pt noted currently he has no symptoms. He denied nausea, vomiting, diarrhea, abdominal pain, and diaphoresis. The pt has a past surgical history that includes Prostate surgery.      The history is provided by the patient.     Review of patient's allergies indicates:  No Known Allergies  Past Medical History:   Diagnosis Date    Anxiety 1/25/2012    Arthritis of knee 1/25/2012    Back pain 1/25/2012    BPH (benign prostatic hyperplasia) 1/25/2012    Essential tremor 3/19/2014    Kyphoscoliosis 1/25/2012     Past Surgical History:   Procedure Laterality Date    APPENDECTOMY      FRACTURE SURGERY Left Dec 18, 2014    HEMORRHOID SURGERY      PROSTATE SURGERY  2008    TURP and had renal insu from obsr    TONSILLECTOMY       Family History   Problem Relation Age of Onset    Heart disease Mother 79        Coronary stent    Parkinsonism Father 83     Social History   Substance Use Topics    Smoking status: Never Smoker    Smokeless tobacco: Never Used    Alcohol use No     Review of Systems   Constitutional: Negative for diaphoresis and fever.   HENT: Negative for congestion and rhinorrhea.    Eyes: Negative " for visual disturbance.   Respiratory: Negative for apnea and shortness of breath.    Cardiovascular: Positive for chest pain and palpitations.   Gastrointestinal: Negative for abdominal distention, abdominal pain, diarrhea, nausea and vomiting.   Genitourinary: Negative for difficulty urinating.   Musculoskeletal: Negative for neck pain.   Skin: Negative for pallor and rash.   Neurological: Negative for headaches.   Hematological: Does not bruise/bleed easily.   Psychiatric/Behavioral: Positive for sleep disturbance. Negative for agitation.       Physical Exam     Initial Vitals [07/29/18 0906]   BP Pulse Resp Temp SpO2   (!) 186/89 77 17 98.1 °F (36.7 °C) 98 %      MAP       --         Physical Exam    Nursing note and vitals reviewed.  Constitutional: He appears well-developed and well-nourished. No distress.   HENT:   Head: Normocephalic and atraumatic.   Eyes: Conjunctivae are normal.   Neck: Normal range of motion. Neck supple.   Cardiovascular: Normal rate, regular rhythm, normal heart sounds and intact distal pulses. Exam reveals no gallop and no friction rub.    No murmur heard.  Pulmonary/Chest: Breath sounds normal. No respiratory distress. He has no wheezes. He has no rhonchi. He has no rales.   Abdominal: Soft. Bowel sounds are normal. He exhibits no distension. There is no tenderness.   Musculoskeletal: Normal range of motion.   Neurological: He is alert and oriented to person, place, and time. He has normal strength.   Skin: Skin is warm and dry.   Psychiatric: He has a normal mood and affect.         ED Course   Procedures  Labs Reviewed   COMPREHENSIVE METABOLIC PANEL - Abnormal; Notable for the following:        Result Value    BUN, Bld 30 (*)     ALT 6 (*)     Anion Gap 7 (*)     eGFR if non  58 (*)     All other components within normal limits   CBC W/ AUTO DIFFERENTIAL - Abnormal; Notable for the following:     RBC 4.05 (*)     Hemoglobin 12.3 (*)     Hematocrit 37.6 (*)     MPV  7.7 (*)     All other components within normal limits   TROPONIN I     EKG Readings: (Independently Interpreted)   Rhythm: Normal Sinus Rhythm. Heart Rate: 72. Ectopy: No Ectopy. Conduction: Normal. ST Segments: Normal ST Segments. T Waves: Normal. Clinical Impression: Normal Sinus Rhythm       Imaging Results    None          Medical Decision Making:   History:   Old Medical Records: I decided to obtain old medical records.  Clinical Tests:   Lab Tests: Reviewed and Ordered  Medical Tests: Reviewed and Ordered  ED Management:  78-year-old male presents with electrical shocks in the chest for the last less than 1 sec.  EKG fails to demonstrate any arrhythmias, ischemia or MI with a negative troponin.  The etiology remains uncertain due to the unusual nature of the symptoms.  During his ED stay he experienced a similar episode with no a arrhythmia is noticed on the monitor.  He is referred to Cardiology for further evaluation.            Scribe Attestation:   Scribe #1: I performed the above scribed service and the documentation accurately describes the services I performed. I attest to the accuracy of the note.    I, Dr. Arnoldo Rasmussen III, personally performed the services described in this documentation. All medical record entries made by the scribe were at my direction and in my presence.  I have reviewed the chart and agree that the record reflects my personal performance and is accurate and complete. Arnoldo Rasmussen III, MD.  11:25 AM 07/29/2018           Clinical Impression:   Diagnoses of Chest discomfort and Palpitations were pertinent to this visit.      Disposition:   Disposition: Discharged  Condition: Stable                        Arnoldo Rasmussen III, MD  07/29/18 1126

## 2018-08-23 ENCOUNTER — TELEPHONE (OUTPATIENT)
Dept: ADMINISTRATIVE | Facility: CLINIC | Age: 78
End: 2018-08-23

## 2018-08-23 NOTE — TELEPHONE ENCOUNTER
Home Health SOC 07/17/2018 to 09/14/2018 with [facility], Dr Harinder Stout. Sn, PT,OT ST services.

## 2018-09-04 ENCOUNTER — HOSPITAL ENCOUNTER (INPATIENT)
Facility: HOSPITAL | Age: 78
LOS: 3 days | Discharge: HOME HEALTH | DRG: 557 | End: 2018-09-07
Attending: EMERGENCY MEDICINE | Admitting: HOSPITALIST
Payer: MEDICARE

## 2018-09-04 DIAGNOSIS — M62.82 RHABDOMYOLYSIS: ICD-10-CM

## 2018-09-04 DIAGNOSIS — G20.A1 PARKINSON DISEASE: Primary | ICD-10-CM

## 2018-09-04 DIAGNOSIS — R52 PAIN: ICD-10-CM

## 2018-09-04 DIAGNOSIS — R53.83 FATIGUE: ICD-10-CM

## 2018-09-04 DIAGNOSIS — R53.81 DEBILITY: ICD-10-CM

## 2018-09-04 DIAGNOSIS — I10 ESSENTIAL HYPERTENSION: ICD-10-CM

## 2018-09-04 DIAGNOSIS — R50.9 FEVER: ICD-10-CM

## 2018-09-04 DIAGNOSIS — E44.0 MALNUTRITION OF MODERATE DEGREE: ICD-10-CM

## 2018-09-04 PROBLEM — G93.41 ENCEPHALOPATHY, METABOLIC: Status: ACTIVE | Noted: 2018-09-04

## 2018-09-04 PROBLEM — N17.9 AKI (ACUTE KIDNEY INJURY): Status: ACTIVE | Noted: 2018-09-04

## 2018-09-04 LAB
ALBUMIN SERPL BCP-MCNC: 3.7 G/DL
ALP SERPL-CCNC: 63 U/L
ALT SERPL W/O P-5'-P-CCNC: 44 U/L
AMORPH CRY URNS QL MICRO: NORMAL
ANION GAP SERPL CALC-SCNC: 9 MMOL/L
AST SERPL-CCNC: 105 U/L
BACTERIA #/AREA URNS HPF: NORMAL /HPF
BASOPHILS # BLD AUTO: 0 K/UL
BASOPHILS NFR BLD: 0.2 %
BILIRUB SERPL-MCNC: 1.3 MG/DL
BILIRUB UR QL STRIP: NEGATIVE
BUN SERPL-MCNC: 28 MG/DL
CALCIUM SERPL-MCNC: 9.4 MG/DL
CHLORIDE SERPL-SCNC: 104 MMOL/L
CK SERPL-CCNC: 3738 U/L
CLARITY UR: CLEAR
CO2 SERPL-SCNC: 24 MMOL/L
COLOR UR: YELLOW
CREAT SERPL-MCNC: 1.3 MG/DL
DIFFERENTIAL METHOD: ABNORMAL
EOSINOPHIL # BLD AUTO: 0 K/UL
EOSINOPHIL NFR BLD: 0 %
ERYTHROCYTE [DISTWIDTH] IN BLOOD BY AUTOMATED COUNT: 13.4 %
EST. GFR  (AFRICAN AMERICAN): >60 ML/MIN/1.73 M^2
EST. GFR  (NON AFRICAN AMERICAN): 53 ML/MIN/1.73 M^2
GLUCOSE SERPL-MCNC: 120 MG/DL
GLUCOSE UR QL STRIP: NEGATIVE
HCT VFR BLD AUTO: 36.5 %
HGB BLD-MCNC: 12.3 G/DL
HGB UR QL STRIP: ABNORMAL
HYALINE CASTS #/AREA URNS LPF: 0 /LPF
KETONES UR QL STRIP: ABNORMAL
LACTATE SERPL-SCNC: 0.9 MMOL/L
LACTATE SERPL-SCNC: 1.2 MMOL/L
LEUKOCYTE ESTERASE UR QL STRIP: ABNORMAL
LYMPHOCYTES # BLD AUTO: 0.4 K/UL
LYMPHOCYTES NFR BLD: 2.8 %
MCH RBC QN AUTO: 30.9 PG
MCHC RBC AUTO-ENTMCNC: 33.7 G/DL
MCV RBC AUTO: 92 FL
MICROSCOPIC COMMENT: NORMAL
MONOCYTES # BLD AUTO: 0.8 K/UL
MONOCYTES NFR BLD: 5.7 %
NEUTROPHILS # BLD AUTO: 13.3 K/UL
NEUTROPHILS NFR BLD: 91.3 %
NITRITE UR QL STRIP: NEGATIVE
PH UR STRIP: 6 [PH] (ref 5–8)
PLATELET # BLD AUTO: 141 K/UL
PMV BLD AUTO: 7.9 FL
POTASSIUM SERPL-SCNC: 3.7 MMOL/L
PROT SERPL-MCNC: 6.4 G/DL
PROT UR QL STRIP: ABNORMAL
RBC # BLD AUTO: 3.99 M/UL
RBC #/AREA URNS HPF: 2 /HPF (ref 0–4)
SODIUM SERPL-SCNC: 137 MMOL/L
SP GR UR STRIP: 1.02 (ref 1–1.03)
SQUAMOUS #/AREA URNS HPF: 1 /HPF
URN SPEC COLLECT METH UR: ABNORMAL
UROBILINOGEN UR STRIP-ACNC: NEGATIVE EU/DL
WBC # BLD AUTO: 14.6 K/UL
WBC #/AREA URNS HPF: 4 /HPF (ref 0–5)

## 2018-09-04 PROCEDURE — 25000003 PHARM REV CODE 250: Performed by: HOSPITALIST

## 2018-09-04 PROCEDURE — 96361 HYDRATE IV INFUSION ADD-ON: CPT

## 2018-09-04 PROCEDURE — 96374 THER/PROPH/DIAG INJ IV PUSH: CPT

## 2018-09-04 PROCEDURE — 82550 ASSAY OF CK (CPK): CPT

## 2018-09-04 PROCEDURE — 63600175 PHARM REV CODE 636 W HCPCS: Performed by: HOSPITALIST

## 2018-09-04 PROCEDURE — 99285 EMERGENCY DEPT VISIT HI MDM: CPT | Mod: 25

## 2018-09-04 PROCEDURE — 85025 COMPLETE CBC W/AUTO DIFF WBC: CPT

## 2018-09-04 PROCEDURE — 80053 COMPREHEN METABOLIC PANEL: CPT

## 2018-09-04 PROCEDURE — 93005 ELECTROCARDIOGRAM TRACING: CPT

## 2018-09-04 PROCEDURE — 83605 ASSAY OF LACTIC ACID: CPT

## 2018-09-04 PROCEDURE — 93010 ELECTROCARDIOGRAM REPORT: CPT | Mod: ,,, | Performed by: INTERNAL MEDICINE

## 2018-09-04 PROCEDURE — 36415 COLL VENOUS BLD VENIPUNCTURE: CPT

## 2018-09-04 PROCEDURE — 87040 BLOOD CULTURE FOR BACTERIA: CPT | Mod: 59

## 2018-09-04 PROCEDURE — 81000 URINALYSIS NONAUTO W/SCOPE: CPT

## 2018-09-04 PROCEDURE — 25000003 PHARM REV CODE 250: Performed by: EMERGENCY MEDICINE

## 2018-09-04 PROCEDURE — 12000002 HC ACUTE/MED SURGE SEMI-PRIVATE ROOM

## 2018-09-04 PROCEDURE — 63600175 PHARM REV CODE 636 W HCPCS: Performed by: EMERGENCY MEDICINE

## 2018-09-04 RX ORDER — CLONAZEPAM 1 MG/1
2 TABLET ORAL NIGHTLY PRN
Status: DISCONTINUED | OUTPATIENT
Start: 2018-09-04 | End: 2018-09-06

## 2018-09-04 RX ORDER — LOSARTAN POTASSIUM 25 MG/1
50 TABLET ORAL DAILY
Status: DISCONTINUED | OUTPATIENT
Start: 2018-09-05 | End: 2018-09-04

## 2018-09-04 RX ORDER — AMOXICILLIN 250 MG
1 CAPSULE ORAL 2 TIMES DAILY
Status: DISCONTINUED | OUTPATIENT
Start: 2018-09-04 | End: 2018-09-07 | Stop reason: HOSPADM

## 2018-09-04 RX ORDER — ACETAMINOPHEN 500 MG
1000 TABLET ORAL EVERY 6 HOURS PRN
Status: DISCONTINUED | OUTPATIENT
Start: 2018-09-04 | End: 2018-09-07 | Stop reason: HOSPADM

## 2018-09-04 RX ORDER — POTASSIUM CHLORIDE 20 MEQ/15ML
40 SOLUTION ORAL
Status: DISCONTINUED | OUTPATIENT
Start: 2018-09-04 | End: 2018-09-07 | Stop reason: HOSPADM

## 2018-09-04 RX ORDER — ONDANSETRON 2 MG/ML
8 INJECTION INTRAMUSCULAR; INTRAVENOUS EVERY 8 HOURS PRN
Status: DISCONTINUED | OUTPATIENT
Start: 2018-09-04 | End: 2018-09-07 | Stop reason: HOSPADM

## 2018-09-04 RX ORDER — KETOROLAC TROMETHAMINE 30 MG/ML
10 INJECTION, SOLUTION INTRAMUSCULAR; INTRAVENOUS
Status: COMPLETED | OUTPATIENT
Start: 2018-09-04 | End: 2018-09-04

## 2018-09-04 RX ORDER — ACETAMINOPHEN 500 MG
1000 TABLET ORAL
Status: COMPLETED | OUTPATIENT
Start: 2018-09-04 | End: 2018-09-04

## 2018-09-04 RX ORDER — LANOLIN ALCOHOL/MO/W.PET/CERES
800 CREAM (GRAM) TOPICAL
Status: DISCONTINUED | OUTPATIENT
Start: 2018-09-04 | End: 2018-09-07 | Stop reason: HOSPADM

## 2018-09-04 RX ORDER — IBUPROFEN 200 MG
16 TABLET ORAL
Status: DISCONTINUED | OUTPATIENT
Start: 2018-09-04 | End: 2018-09-04

## 2018-09-04 RX ORDER — POTASSIUM CHLORIDE 20 MEQ/15ML
60 SOLUTION ORAL
Status: DISCONTINUED | OUTPATIENT
Start: 2018-09-04 | End: 2018-09-07 | Stop reason: HOSPADM

## 2018-09-04 RX ORDER — CARBIDOPA AND LEVODOPA 25; 100 MG/1; MG/1
1 TABLET ORAL 3 TIMES DAILY
Status: DISCONTINUED | OUTPATIENT
Start: 2018-09-04 | End: 2018-09-06

## 2018-09-04 RX ORDER — ENOXAPARIN SODIUM 100 MG/ML
40 INJECTION SUBCUTANEOUS EVERY 12 HOURS
Status: DISCONTINUED | OUTPATIENT
Start: 2018-09-04 | End: 2018-09-05

## 2018-09-04 RX ORDER — SODIUM CHLORIDE 9 MG/ML
INJECTION, SOLUTION INTRAVENOUS CONTINUOUS
Status: DISCONTINUED | OUTPATIENT
Start: 2018-09-04 | End: 2018-09-07 | Stop reason: HOSPADM

## 2018-09-04 RX ORDER — SODIUM CHLORIDE 0.9 % (FLUSH) 0.9 %
5 SYRINGE (ML) INJECTION
Status: DISCONTINUED | OUTPATIENT
Start: 2018-09-04 | End: 2018-09-07 | Stop reason: HOSPADM

## 2018-09-04 RX ORDER — GLUCAGON 1 MG
1 KIT INJECTION
Status: DISCONTINUED | OUTPATIENT
Start: 2018-09-04 | End: 2018-09-04

## 2018-09-04 RX ORDER — IBUPROFEN 200 MG
24 TABLET ORAL
Status: DISCONTINUED | OUTPATIENT
Start: 2018-09-04 | End: 2018-09-04

## 2018-09-04 RX ADMIN — ACETAMINOPHEN 1000 MG: 500 TABLET ORAL at 01:09

## 2018-09-04 RX ADMIN — DOCUSATE SODIUM AND SENNOSIDES 1 TABLET: 50; 8.6 TABLET, FILM COATED ORAL at 09:09

## 2018-09-04 RX ADMIN — SODIUM CHLORIDE: 0.9 INJECTION, SOLUTION INTRAVENOUS at 09:09

## 2018-09-04 RX ADMIN — KETOROLAC TROMETHAMINE 10 MG: 30 INJECTION, SOLUTION INTRAMUSCULAR at 04:09

## 2018-09-04 RX ADMIN — CARBIDOPA AND LEVODOPA 1 TABLET: 25; 100 TABLET ORAL at 09:09

## 2018-09-04 RX ADMIN — ENOXAPARIN SODIUM 40 MG: 100 INJECTION SUBCUTANEOUS at 09:09

## 2018-09-04 RX ADMIN — SODIUM CHLORIDE 1593 ML: 0.9 INJECTION, SOLUTION INTRAVENOUS at 03:09

## 2018-09-04 NOTE — ED NOTES
Upon transfer to room 218-a, patient opens his eyes with the sound of my voice. No needs or questions at this time. No cardiac or respiratory complications.

## 2018-09-04 NOTE — ED NOTES
Patient has been updated on admission. No needs or questions at this time. Dr. Walton is at the bedside.

## 2018-09-04 NOTE — ED NOTES
Presents to the ER with c/o fall that occurred just prior to arrival. Family reports that patient fell out of bed and had his left arm stuck in the bed rail for 30 minutes. Patient is lethargic secondary to home levadopa. Erythema to left hand and forearm. Febrile upon arrival to the ED. Mucous membranes are pink and moist. Skin is warm, dry and intact. Lungs are clear bilaterally, respirations are regular and unlabored. Denies cough, congestion, rhinorrhea or SOB. BS active x4, no tenderness with palpation, abd is soft and not distended. Denies any appetite or activity change. S1S2, capillary refill is < 2 seconds. Denies dysuria, difficulty urinating, frequency, numbness, tingling or weakness. NAND VSS     Patient's left arm and hand are erythremic.

## 2018-09-04 NOTE — ED PROVIDER NOTES
Encounter Date: 9/4/2018    SCRIBE #1 NOTE: IAmrik, am scribing for, and in the presence of, Dr. Duque .       History     Chief Complaint   Patient presents with    Fall     pt had lt arm stuck in bedrail PTA, lt arm swelling noted       Time seen by provider: 12:50 PM on 09/04/2018    Toni Kamara is a 78 y.o. male with a hx of Parkinson's and BPH who presents to the ED with c/o left arm swelling and pain to the left hand. Pt states he fell while in the bed. His left arm was stuck in the bed rails for approximately 30-45 minutes. Pt denies left wrist pain, left forearm pain and BLE pain. NKDA noted.   No further history from the patient is obtainable.  No family at the bedside on arrival.      The history is provided by the patient and the spouse. History limited by: Patient is a limited historian.     Review of patient's allergies indicates:  No Known Allergies  Past Medical History:   Diagnosis Date    Anxiety 1/25/2012    Arthritis of knee 1/25/2012    Back pain 1/25/2012    BPH (benign prostatic hyperplasia) 1/25/2012    Essential tremor 3/19/2014    Kyphoscoliosis 1/25/2012     Past Surgical History:   Procedure Laterality Date    APPENDECTOMY      FRACTURE SURGERY Left Dec 18, 2014    HEMORRHOID SURGERY      PROSTATE SURGERY  2008    TURP and had renal insu from obsr    TONSILLECTOMY       Family History   Problem Relation Age of Onset    Heart disease Mother 79        Coronary stent    Parkinsonism Father 83     Social History     Tobacco Use    Smoking status: Never Smoker    Smokeless tobacco: Never Used   Substance Use Topics    Alcohol use: No    Drug use: No     Review of Systems   Constitutional: Negative for fever.   Respiratory: Negative for shortness of breath.    Cardiovascular: Negative for chest pain.   Gastrointestinal: Negative for nausea.   Genitourinary: Negative for dysuria.   Musculoskeletal: Positive for arthralgias (Left hand pain ). Negative for back pain  "and myalgias.        + for "left arm swelling"   Skin: Negative for rash.   Neurological: Negative for weakness.   Hematological: Does not bruise/bleed easily.   All other systems reviewed and are negative.      Physical Exam     Initial Vitals [09/04/18 1248]   BP Pulse Resp Temp SpO2   121/66 90 20 (!) 101 °F (38.3 °C) 98 %      MAP       --         Physical Exam    Nursing note and vitals reviewed.  Constitutional: He appears well-developed and well-nourished. He is not diaphoretic.  Non-toxic appearance. He does not have a sickly appearance. He does not appear ill. No distress.     Chronically ill-appearing but no acute distress   HENT:   Head: Normocephalic and atraumatic.   Eyes: EOM are normal.   Neck: Normal range of motion. Neck supple. Normal range of motion present. No neck rigidity.   Cardiovascular: Normal rate, regular rhythm and normal heart sounds. Exam reveals no gallop and no friction rub.    No murmur heard.  Pulmonary/Chest: Breath sounds normal. No respiratory distress. He has no wheezes. He has no rhonchi. He has no rales.   Abdominal: Soft. He exhibits no distension. There is no tenderness.   Musculoskeletal: He exhibits edema and tenderness.        Left shoulder: He exhibits decreased range of motion, tenderness and bony tenderness.        Left elbow: Normal.        Left wrist: Normal.        Left hand: He exhibits swelling.   Left shoulder tenderness. Left hand tenderness and mild swelling.   Neurological: He is alert.   Oriented to year and location, recent memory is poor   Skin: Skin is warm and dry. No rash noted.   Psychiatric: He has a normal mood and affect. His behavior is normal. Judgment and thought content normal.         ED Course   Procedures  Labs Reviewed - No data to display       Imaging Results    None          Medical Decision Making:   History:   I obtained history from: someone other than patient.       <> Summary of History:  spouse  Old Medical Records: I decided to " obtain old medical records.  Clinical Tests:   Lab Tests: Ordered and Reviewed  Radiological Study: Reviewed and Ordered            Scribe Attestation:   Scribe #1: I performed the above scribed service and the documentation accurately describes the services I performed. I attest to the accuracy of the note.    I, Dr. Duque, personally performed the services described in this documentation. All medical record entries made by the scribe were at my direction and in my presence.  I have reviewed the chart and agree that the record reflects my personal performance and is accurate and complete.9:19 PM 09/04/2018     78-year-old male with Parkinson's presents to the ER by ambulance for left arm swelling after he fell out of bed last night.  History from the patient very limited on arrival.  Patient found to have a fever in the emergency room which he was unaware of.  Wife reports that she found him out of the bed with his arm through the bed rails last night and thinks he was stuck that way for 30 min.  Brought in the ER today because of arm pain and swelling.  Found to have a fever.  She does relate that prior to arrival earlier today he had a higher fever and was given medication for this.  No other complaints.  No headache no neck stiffness no runny nose cough congestion chest pain shortness of breath abdominal pain vomiting diarrhea or flank pain.  Uncertain etiology of the fever.  Found to be in rhabdomyolysis.  Patient will be admitted to Hospital Medicine.        ED Course as of Sep 04 1840   Tue Sep 04, 2018   1256 BP: 121/66 [EF]   1256 Temp: (!) 101 °F (38.3 °C) [EF]   1256 Temp src: Oral [EF]   1256 Pulse: 90 [EF]   1256 Resp: 20 [EF]   1256 SpO2: 98 % [EF]   1344 Left hand x-ray questions possible foreign body, physical exam demonstrates no findings consistent with recent trauma or concern for acute FB  [EF]   1433   Wife now at the bedside to provide more history.  She states the patient had a 102.8 degree  fever earlier today.  He fell out of bed last night at 1:30 a.m. In the morning and somehow get his arm stuck in the bed rail.  The patient was seated on the floor with his arms stuck thru the bed rails.  She thinks he was stuck like that for about half an hour.  Sepsis orders added on.  Repeat temperature added on  [EF]   1434  Doctor Anton consulted for likely admission  [EF]   1509 WBC: (!) 14.60 [EF]   1532 CPK: (!) 3738 [EF]      ED Course User Index  [EF] Ricky Duque MD     Clinical Impression:     1. Pain                                     Ricky Duque MD  09/04/18 9970

## 2018-09-05 LAB
ALBUMIN SERPL BCP-MCNC: 3 G/DL
ALP SERPL-CCNC: 52 U/L
ALT SERPL W/O P-5'-P-CCNC: 22 U/L
ANION GAP SERPL CALC-SCNC: 9 MMOL/L
AST SERPL-CCNC: 119 U/L
BASOPHILS # BLD AUTO: 0 K/UL
BASOPHILS NFR BLD: 0.2 %
BILIRUB SERPL-MCNC: 1.1 MG/DL
BUN SERPL-MCNC: 31 MG/DL
CALCIUM SERPL-MCNC: 8.5 MG/DL
CHLORIDE SERPL-SCNC: 110 MMOL/L
CK SERPL-CCNC: 3390 U/L
CO2 SERPL-SCNC: 21 MMOL/L
CREAT SERPL-MCNC: 1.2 MG/DL
DIFFERENTIAL METHOD: ABNORMAL
EOSINOPHIL # BLD AUTO: 0 K/UL
EOSINOPHIL NFR BLD: 0 %
ERYTHROCYTE [DISTWIDTH] IN BLOOD BY AUTOMATED COUNT: 13.6 %
EST. GFR  (AFRICAN AMERICAN): >60 ML/MIN/1.73 M^2
EST. GFR  (NON AFRICAN AMERICAN): 58 ML/MIN/1.73 M^2
GLUCOSE SERPL-MCNC: 84 MG/DL
HCT VFR BLD AUTO: 33.5 %
HGB BLD-MCNC: 11.3 G/DL
LYMPHOCYTES # BLD AUTO: 0.9 K/UL
LYMPHOCYTES NFR BLD: 7 %
MAGNESIUM SERPL-MCNC: 1.7 MG/DL
MCH RBC QN AUTO: 31 PG
MCHC RBC AUTO-ENTMCNC: 33.7 G/DL
MCV RBC AUTO: 92 FL
MONOCYTES # BLD AUTO: 0.9 K/UL
MONOCYTES NFR BLD: 7.2 %
NEUTROPHILS # BLD AUTO: 10.7 K/UL
NEUTROPHILS NFR BLD: 85.6 %
PHOSPHATE SERPL-MCNC: 3.2 MG/DL
PLATELET # BLD AUTO: 119 K/UL
PMV BLD AUTO: 8.6 FL
POTASSIUM SERPL-SCNC: 3.6 MMOL/L
PROT SERPL-MCNC: 5.4 G/DL
RBC # BLD AUTO: 3.65 M/UL
SODIUM SERPL-SCNC: 140 MMOL/L
WBC # BLD AUTO: 12.4 K/UL

## 2018-09-05 PROCEDURE — 97530 THERAPEUTIC ACTIVITIES: CPT

## 2018-09-05 PROCEDURE — G8978 MOBILITY CURRENT STATUS: HCPCS | Mod: CK

## 2018-09-05 PROCEDURE — 97167 OT EVAL HIGH COMPLEX 60 MIN: CPT

## 2018-09-05 PROCEDURE — 97110 THERAPEUTIC EXERCISES: CPT

## 2018-09-05 PROCEDURE — 97802 MEDICAL NUTRITION INDIV IN: CPT

## 2018-09-05 PROCEDURE — 83735 ASSAY OF MAGNESIUM: CPT

## 2018-09-05 PROCEDURE — G8987 SELF CARE CURRENT STATUS: HCPCS | Mod: CL

## 2018-09-05 PROCEDURE — 12000002 HC ACUTE/MED SURGE SEMI-PRIVATE ROOM

## 2018-09-05 PROCEDURE — 85025 COMPLETE CBC W/AUTO DIFF WBC: CPT

## 2018-09-05 PROCEDURE — 80053 COMPREHEN METABOLIC PANEL: CPT

## 2018-09-05 PROCEDURE — 25000003 PHARM REV CODE 250: Performed by: HOSPITALIST

## 2018-09-05 PROCEDURE — 36415 COLL VENOUS BLD VENIPUNCTURE: CPT

## 2018-09-05 PROCEDURE — 63600175 PHARM REV CODE 636 W HCPCS: Performed by: HOSPITALIST

## 2018-09-05 PROCEDURE — 97535 SELF CARE MNGMENT TRAINING: CPT

## 2018-09-05 PROCEDURE — 82550 ASSAY OF CK (CPK): CPT

## 2018-09-05 PROCEDURE — 97116 GAIT TRAINING THERAPY: CPT

## 2018-09-05 PROCEDURE — 94761 N-INVAS EAR/PLS OXIMETRY MLT: CPT

## 2018-09-05 PROCEDURE — G8988 SELF CARE GOAL STATUS: HCPCS | Mod: CK

## 2018-09-05 PROCEDURE — 84100 ASSAY OF PHOSPHORUS: CPT

## 2018-09-05 PROCEDURE — 97161 PT EVAL LOW COMPLEX 20 MIN: CPT

## 2018-09-05 PROCEDURE — G8979 MOBILITY GOAL STATUS: HCPCS | Mod: CJ

## 2018-09-05 RX ORDER — ENOXAPARIN SODIUM 100 MG/ML
40 INJECTION SUBCUTANEOUS EVERY 24 HOURS
Status: DISCONTINUED | OUTPATIENT
Start: 2018-09-06 | End: 2018-09-07 | Stop reason: HOSPADM

## 2018-09-05 RX ADMIN — DOCUSATE SODIUM AND SENNOSIDES 1 TABLET: 50; 8.6 TABLET, FILM COATED ORAL at 02:09

## 2018-09-05 RX ADMIN — SODIUM CHLORIDE: 0.9 INJECTION, SOLUTION INTRAVENOUS at 05:09

## 2018-09-05 RX ADMIN — SODIUM CHLORIDE: 0.9 INJECTION, SOLUTION INTRAVENOUS at 01:09

## 2018-09-05 RX ADMIN — ACETAMINOPHEN 1000 MG: 500 TABLET, FILM COATED ORAL at 02:09

## 2018-09-05 RX ADMIN — CARBIDOPA AND LEVODOPA 1 TABLET: 25; 100 TABLET ORAL at 02:09

## 2018-09-05 RX ADMIN — DOCUSATE SODIUM AND SENNOSIDES 1 TABLET: 50; 8.6 TABLET, FILM COATED ORAL at 08:09

## 2018-09-05 RX ADMIN — ENOXAPARIN SODIUM 40 MG: 100 INJECTION SUBCUTANEOUS at 09:09

## 2018-09-05 RX ADMIN — CARBIDOPA AND LEVODOPA 1 TABLET: 25; 100 TABLET ORAL at 08:09

## 2018-09-05 NOTE — PLAN OF CARE
Problem: Patient Care Overview  Goal: Plan of Care Review  Outcome: Ongoing (interventions implemented as appropriate)  Fall and safety precautions maintained. SR-ST on the monitor. VSS. Pt has refused to be turned for most of the day. NS infusing at 125mL/hr. Side rails up x2, call light in reach. Will continue to monitor.

## 2018-09-05 NOTE — H&P
[facility] Hospital Medicine  History & Physical    Patient Name: Toni Kamara  MRN: [id number]  Admission Date: 9/4/2018  Attending Physician: Macario Walton MD  Primary Care Provider: Harinder Stout MD         Patient information was obtained from spouse/SO, past medical records and ER records.     Subjective:     Principal Problem:Rhabdomyolysis    Chief Complaint:   Chief Complaint   Patient presents with    Fall     pt had lt arm stuck in bedrail PTA, lt arm swelling noted        HPI: Toni Kamara is a 78 y.o. male with a hx of Parkinson's and BPH who presents to the ED with c/o left arm swelling and pain to the left hand. Pt states he fell while in the bed. His left arm was stuck in the bed rails for approximately 30-45 minutes. Pt denies left wrist pain, left forearm pain and BLE pain. NKDA noted.  Per the wife, who is the primary history provider, the patient has been more lethargic and moaning in pain. She also noted fever of 102.8, which was also noted in the ED. Patient denies cough, neck pain, or abdominal symptoms. Patient was noted in ED to have developed rhabdomyolysis.          Past Medical History:   Diagnosis Date    Anxiety 1/25/2012    Arthritis of knee 1/25/2012    Back pain 1/25/2012    BPH (benign prostatic hyperplasia) 1/25/2012    Essential tremor 3/19/2014    Kyphoscoliosis 1/25/2012    Parkinson's disease        Past Surgical History:   Procedure Laterality Date    APPENDECTOMY      FRACTURE SURGERY Left Dec 18, 2014    HEMORRHOID SURGERY      PROSTATE SURGERY  2008    TURP and had renal insu from obsr    TONSILLECTOMY         Review of patient's allergies indicates:  No Known Allergies    No current facility-administered medications on file prior to encounter.      Current Outpatient Medications on File Prior to Encounter   Medication Sig    carbidopa-levodopa  mg (SINEMET)  mg per tablet Take 2 tablets by mouth 3 (three) times daily.      clonazePAM (KLONOPIN) 2 MG Tab TAKE ONE TABLET BY MOUTH AT BEDTIME AS NEEDED (Patient taking differently: TAKE ONE TABLET BY MOUTH AT BEDTIME AS NEEDED insomnia)    losartan (COZAAR) 50 MG tablet Take 1 tablet (50 mg total) by mouth once daily.    oxycodone-acetaminophen (PERCOCET) 5-325 mg per tablet Take 1 tablet by mouth every 4 (four) hours as needed for Pain.     [DISCONTINUED] carbidopa-levodopa  mg (SINEMET)  mg per tablet     [DISCONTINUED] walker Misc 1 each by Misc.(Non-Drug; Combo Route) route continuous. Rolling walker     Family History     Problem Relation (Age of Onset)    Heart disease Mother (79)    Parkinsonism Father (83)        Tobacco Use    Smoking status: Former Smoker    Smokeless tobacco: Never Used   Substance and Sexual Activity    Alcohol use: No    Drug use: No    Sexual activity: Not on file     Review of Systems   Unable to perform ROS: Mental status change     Objective:     Vital Signs (Most Recent):  Temp: 99.1 °F (37.3 °C) (09/04/18 2024)  Pulse: 105 (09/04/18 2024)  Resp: 14 (09/04/18 2024)  BP: (!) 168/99 (09/04/18 2024)  SpO2: 100 % (09/04/18 2024) Vital Signs (24h Range):  Temp:  [99.1 °F (37.3 °C)-101 °F (38.3 °C)] 99.1 °F (37.3 °C)  Pulse:  [] 105  Resp:  [14-20] 14  SpO2:  [98 %-100 %] 100 %  BP: (108-168)/(58-99) 168/99     Weight: 54.6 kg (120 lb 5 oz)  Body mass index is 19.42 kg/m².    Physical Exam   Constitutional: No distress.   Thin, frail elderly man who is lethargic but answers questions appropriately.    HENT:   Head: Normocephalic.   Mouth/Throat: Oropharynx is clear and moist.   Temporal wasting   Eyes: Conjunctivae are normal. Right eye exhibits no discharge. Left eye exhibits no discharge. No scleral icterus.   Neck: No JVD present.   Cardiovascular: Normal rate, regular rhythm, normal heart sounds and intact distal pulses. Exam reveals no friction rub.   No murmur heard.  Pulmonary/Chest: Effort normal and breath sounds normal. No  respiratory distress. He has no wheezes.   Abdominal: Soft. Bowel sounds are normal. He exhibits no distension. There is no tenderness.   Musculoskeletal: Normal range of motion. He exhibits no edema.   DARCIEE with petechial hemorrhage and swelling c/w tourniquet injury.    Lymphadenopathy:     He has no cervical adenopathy.   Neurological: He has normal reflexes.   Lethargic but arousable. Answers questions appropriately. Noted resting tremor in upper extremities.   Skin: No rash noted. He is not diaphoretic. No erythema.   Psychiatric: He has a normal mood and affect. His behavior is normal.   Nursing note and vitals reviewed.          Significant Labs: All pertinent labs within the past 24 hours have been reviewed.    Significant Imaging: I have reviewed all pertinent imaging results/findings within the past 24 hours.    Assessment/Plan:     * Rhabdomyolysis    Likely d/t crush/tourniquet injury of KIRK when he fell out of the bed and caught his arm in the rail. Will need IV hydration and monitoring or renal function closely in hospital. PT/OT. No need for surgical intervention at the moment.          Fever    Acute, but source unknown.Etiology possibly infectious versus drug induced or inflammatory d/t tourniquet injury of arm. Monitor. Hold off ABX currently.          Encephalopathy, metabolic    Likely d/t renal failure versus sinemet toxicity. Encephalopathy noted as a secondary process related to the patient's acute illness. There is no specific treatment. Monitor neuro status, avoid medications such as narcotics and benzos which may worsen confusion, and use anti-psychotics to prevent behaviors of self harm.            Malnutrition of moderate degree    Nutrition consulted. Body mass index is 19.42 kg/m².. Encourage maximal PO intake. Diet supplementation ordered per nutrition approval. Will encourage PO and monitor closely for weight changes.            STEWART (acute kidney injury)    Patient with STEWART likely d/t  Rhabdomyolysis  Which is currently stable. Labs reviewed- BMP with Estimated Creatinine Clearance: 36.8 mL/min (based on SCr of 1.3 mg/dL). according to latest data. Monitor UOP and serial BMP and adjust therapy as needed. Avoid nephrotoxins and renally dose meds for GFR listed above.              Essential hypertension    Chronic, controlled.  Monitor BP closely.  Will Hold ARB  medications and add as needed for sustained BP control.            Parkinson disease    Reduce sinemet by 50% d/t lethargy and potential decreased renal excretion. Tremor is mild.             VTE Risk Mitigation (From admission, onward)        Ordered     enoxaparin injection 40 mg  Every 12 hours      09/04/18 2005     IP VTE HIGH RISK PATIENT  Once      09/04/18 2005             Macario Walton MD  Department of Hospital Medicine   [facility]

## 2018-09-05 NOTE — ASSESSMENT & PLAN NOTE
Likely d/t crush/tourniquet injury of KIRK when he fell out of the bed and caught his arm in the rail. Will need IV hydration and monitoring or renal function closely in hospital. PT/OT. No need for surgical intervention at the moment.

## 2018-09-05 NOTE — PROGRESS NOTES
" [facility]  Adult Nutrition  Progress Note    SUMMARY       Recommendations    Recommendation/Intervention: 1) Continue regular diet 2) allow family to bring in food from home, and nutritional shakes 3) Trial Boost Plus, continue to send daily 4) snacks per pt preference 5) If Pt not eating 75% of meals, consider supplementall TF  Goals: 1) PO intakes > 50% of meals and supplements 2) Pt meets > 75% estimated needs by f/u  Nutrition Goal Status: new  Communication of RD Recs: reviewed with physician    Reason for Assessment    Reason for Assessment: consult  Relevant Medical History: Parkinson's, BPH, arthritis, essential tremor  Interdisciplinary Rounds: attended  General Information Comments: 77 y/o male admitted with STEWART and rhabdomyolysis with arm swelling s/p fall. Family claims pt has lost 5# x 1 year not 30# since April, they say pt is confused about his usual body weight., he was somewhat confused/distracted during interview as well. Pt with severe muscle/fat wasting @ temples, clavicle, calves, patellar region, orbital area and interousseous. Picky eater, refusing meals and supplements. Educated pt on importance of meeting needs. Sending Boost Plus daiily as trial. Family agreed to bring in foods he likes and Nutritional shake from [employer] (to provide ~200 kcal and 15 g protein per carton).  Nutrition Discharge Planning: To be determined    Nutrition Risk Screen    Nutrition Risk Screen: no indicators present    Nutrition/Diet History    Typical Food/Fluid Intake: 2 small meals and 1 small snack (fruit, nuts or sweets daily)  Food Preferences: Nuts, fruit, oatmeal  Do you have any cultural, spiritual, Sabianist conflicts, given your current situation?: none  Food Allergies: NKFA(or intolerances)  Factors Affecting Nutritional Intake: (Decreased appetite/anorexia)   Supplements: Many supplements, and most recently added "mushrooms".  Wife did not have a list of the " "supplements.      Anthropometrics    Temp: 98.2 °F (36.8 °C)  Height Method: Measured  Height: 5' 6"  Height (inches): 66 in  Weight Method: Bed Scale  Weight: 53.4 kg (117 lb 11.6 oz)(75 kg )  Weight (lb): 117.73 lb  Ideal Body Weight (IBW), Male: 142 lb  % Ideal Body Weight, Male (lb): 82.91 lb  BMI (Calculated): 19  BMI Grade: 18.5-24.9 - normal  Weight Loss: unintentional  Usual Body Weight (UBW), k kg  % Usual Body Weight: 92.26  % Weight Change From Usual Weight: -7.93 %       Lab/Procedures/Meds    Pertinent Labs Reviewed: reviewed  Lab Results   Component Value Date    ALBUMIN 3.0 (L) 2018     Lab Results   Component Value Date    WBC 12.40 2018     Pertinent Medications Reviewed: reviewed  Pertinent Medications Comments: Levadopa (decreased dose during admission), senna, KCl, Mg oxide    Physical Findings/Assessment    Overall Physical Appearance: loss of subcutaneous fat, loss of muscle mass, underweight, generalized wasting  Oral/Mouth Cavity: WDL  Skin: edema(arm swelling only)    Estimated/Assessed Needs    Weight Used For Calorie Calculations: 53.1 kg (117 lb 1 oz)  Energy Calorie Requirements (kcal): 1198.75 (x 1.5 activity factor)= 1780 kcal for wt gain  Energy Need Method: [facility]  Protein Requirements: 58-69 g protein (1.1-1.3 g protein/kg)  Weight Used For Protein Calculations: 53.4 kg (117 lb 11.6 oz)  Fluid Requirements (mL): 1780 ml  Fluid Need Method: RDA Method  RDA Method (mL): 1198.75  CHO Requirement: 50%      Nutrition Prescription Ordered    Current Diet Order: Regular  Oral Nutrition Supplement: Boost Plus daily    Evaluation of Received Nutrient/Fluid Intake    Energy Calories Required: not meeting needs  Protein Required: not meeting needs  Fluid Required: not meeting needs  Tolerance: tolerating  % Intake of Estimated Energy Needs: 0 - 25 %  % Meal Intake: 0 - 25 %   18: Pt refused lunch today including foods family brought in.     Nutrition " Risk    Level of Risk/Frequency of Follow-up: moderate - high     Assessment and Plan    Malnutrition of moderate degree    Contributing Nutrition Diagnosis  Moderate Chronic protein calorie malnutrition    Related to (etiology):   Decreased appetite, increased needs r/t tremor    Signs and Symptoms (as evidenced by):   1) PO intakes < 75% estimated needs for > 1 year   2) Moderate muscle/ fat wasting @ clavicle, temples, orbital area, calves, interosseous, pattellar region, and shoulders  3) Underweight with BMI 19 (BMI <23 in >65 yr old)    Interventions/Recommendations (treatment strategy):  1) Boost Plus daily 2) Allow family to bring in food/supplements that pt would like 3) Consider supplemental TF if PO intakes not meeting 75% estimated needs    Nutrition Diagnosis Status:   New               Monitor and Evaluation    Food and Nutrient Intake: energy intake  Anthropometric Measurements: weight  Nutrition-Focused Physical Findings: overall appearance     Nutrition Follow-Up    RD Follow-up?: Yes

## 2018-09-05 NOTE — PLAN OF CARE
"   09/04/18 2027   Patient Assessment/Suction   Level of Consciousness (AVPU) alert   PRE-TX-O2-ETCO2   O2 Device (Oxygen Therapy) room air   SpO2 99 %   Pulse Oximetry Type Intermittent   IBW/VT Calculations   Height 5' 6" (1.676 m)   IBW/kg (Calculated) Male 63.8 kg   Low Range Vt 4cc/kg MALE 255.2 mL   Low Range Vt 6cc/kg MALE 382.8 mL   Adult Moderate Range Vt 8cc/kg .4 mL   Adult High Range Vt 10cc/kg MALE 638 mL     "

## 2018-09-05 NOTE — ASSESSMENT & PLAN NOTE
Chronic, controlled.  Monitor BP closely.  Will Hold ARB  medications and add as needed for sustained BP control.

## 2018-09-05 NOTE — ASSESSMENT & PLAN NOTE
Nutrition consulted. Body mass index is 19.42 kg/m².. Encourage maximal PO intake. Diet supplementation ordered per nutrition approval. Will encourage PO and monitor closely for weight changes.

## 2018-09-05 NOTE — HPI
Toni Kamara is a 78 y.o. male with a hx of Parkinson's and BPH who presents to the ED with c/o left arm swelling and pain to the left hand. Pt states he fell while in the bed. His left arm was stuck in the bed rails for approximately 30-45 minutes. Pt denies left wrist pain, left forearm pain and BLE pain. NKDA noted. Per the wife, who is the primary history provider, the patient has been more lethargic and moaning in pain. She also noted fever of 102.8, which was also noted in the ED. Patient denies cough, neck pain, or abdominal symptoms. Patient was noted in ED to have developed rhabdomyolysis.

## 2018-09-05 NOTE — ASSESSMENT & PLAN NOTE
Acute, but source unknown.Etiology possibly infectious versus drug induced or inflammatory d/t tourniquet injury of arm. Monitor. Hold off ABX currently.

## 2018-09-05 NOTE — PLAN OF CARE
Problem: Physical Therapy Goal  Goal: Physical Therapy Goal  Goals to be met by: 18     Patient will increase functional independence with mobility by performin. Supine to sit with Contact Guard Assistance  2. Sit to supine with Contact Guard Assistance  3. Rolling to Left and Right with Contact Guard Assistance.  4. Sit to stand transfer with Contact Guard Assistance  5. Bed to chair transfer with Contact Guard Assistance using Rolling Walker  6. Gait  x 500 feet with Stand-by Assistance using Rolling Walker.     Outcome: Ongoing (interventions implemented as appropriate)  PT for functional mob training

## 2018-09-05 NOTE — PT/OT/SLP EVAL
Physical Therapy Evaluation    Patient Name:  Toni Kamara   MRN:  [id number]    Recommendations:     Discharge Recommendations:  home health PT   Discharge Equipment Recommendations: walker, rolling, bath bench   Barriers to discharge: None    Assessment:     Toni Kamara is a 78 y.o. male admitted with a medical diagnosis of Rhabdomyolysis.  He presents with the following impairments/functional limitations:  weakness, gait instability, decreased upper extremity function, decreased lower extremity function, impaired balance, impaired endurance, impaired sensation, decreased safety awareness, decreased coordination, impaired cardiopulmonary response to activity, pain to (L) UE, pt tends to be more particular with the way things are done during PT.    Rehab Prognosis:  good; patient would benefit from acute skilled PT services to address these deficits and reach maximum level of function.      Recent Surgery: * No surgery found *      Plan:     During this hospitalization, patient to be seen daily to address the above listed problems via gait training, therapeutic activities, therapeutic exercises  · Plan of Care Expires:      Plan of Care Reviewed with: patient, spouse, daughter, son    Subjective     Communicated with PAO Haddad prior to session.  Patient found supine upon PT entry to room, agreeable to evaluation; wife and children came during PT Eval; pt requested for PT to come back for Tx later than 11 am qd.    Chief Complaint: tenderness to (L) UE  Patient comments/goals: wants to walk with PT and go home soon  Pain/Comfort:  · Pain Rating 1: 3/10(tenderness to (L) hand and wrist)  · Location - Side 1: Left  · Location - Orientation 1: generalized  · Location 1: hand(wrist and FA)  · Pain Rating Post-Intervention 1: 3/10    Patients cultural, spiritual, Baptism conflicts given the current situation: none    Living Environment:  Lives in a 1-jade house with wife and a family that he helped at his [employer];  has no steps except the threshold at the entrance.  Prior to admission, patients level of function was mod (I)/ I; neds help with drying himself after the shower but can shower and go to the bathroom by himself.  Patient has the following equipment: rollator, 3-in-1 commode.  DME owned (not currently used): none.  Upon discharge, patient will have assistance from family.    Objective:     Patient found with: telemetry, peripheral IV     General Precautions: Standard, aphasia, fall   Orthopedic Precautions:N/A   Braces: N/A     Exams:  · Cognitive Exam:  Patient is oriented to Person, Place, Time and Situation  · Gross Motor Coordination:  min impaired  · Postural Exam:  Patient presented with the following abnormalities:    · -       Rounded shoulders  · -       Forward head  · -       Kyphosis  · Sensation:    · -       Impaired  hypersensitive to (L)UE/ tenderness to touch  · RLE ROM: WFL  · RLE Strength: graded 3-/5 to3+/5  · LLE ROM: WFL  · LLE Strength: graded 3-/5 to3+/5    Functional Mobility:  · Bed Mobility:     · Rolling Right: minimum assistance  · Scooting: moderate assistance  · Supine to Sit: moderate assistance  · Transfers:     · Sit to Stand:  moderate assistance with rolling walker  · Gait: 400 ft with a RW, CGA with stooped posture corrected minimally by tactile and verbal instructions  · Balance: SITTING: Good (-); STANDING: Static: fair; Dynamic: fair(-)    AM-PAC 6 CLICK MOBILITY  Total Score:15       Therapeutic Activities and Exercises:   functional mob training done as above; safety and mobility techniques instructed to pt and family.    Patient left supine with all lines intact, call button in reach, RN Catie notifleyla and family present.    GOALS:   Multidisciplinary Problems     Physical Therapy Goals        Problem: Physical Therapy Goal    Goal Priority Disciplines Outcome Goal Variances Interventions   Physical Therapy Goal     PT, PT/OT Ongoing (interventions implemented as  appropriate)     Description:  Goals to be met by: 18     Patient will increase functional independence with mobility by performin. Supine to sit with Contact Guard Assistance  2. Sit to supine with Contact Guard Assistance  3. Rolling to Left and Right with Contact Guard Assistance.  4. Sit to stand transfer with Contact Guard Assistance  5. Bed to chair transfer with Contact Guard Assistance using Rolling Walker  6. Gait  x 500 feet with Stand-by Assistance using Rolling Walker.                       History:     Past Medical History:   Diagnosis Date    Anxiety 2012    Arthritis of knee 2012    Back pain 2012    BPH (benign prostatic hyperplasia) 2012    Essential tremor 3/19/2014    Kyphoscoliosis 2012    Parkinson's disease        Past Surgical History:   Procedure Laterality Date    APPENDECTOMY      FRACTURE SURGERY Left Dec 18, 2014    HEMORRHOID SURGERY      PROSTATE SURGERY      TURP and had renal insu from obsr    TONSILLECTOMY         Clinical Decision Making:     History  Co-morbidities and personal factors that may impact the plan of care Examination  Body Structures and Functions, activity limitations and participation restrictions that may impact the plan of care Clinical Presentation   Decision Making/ Complexity Score   Co-morbidities:   [] Time since onset of injury / illness / exacerbation  [] Status of current condition  []Patient's cognitive status and safety concerns    [] Multiple Medical Problems (see med hx)  Personal Factors:   [] Patient's age  [] Prior Level of function   [] Patient's home situation (environment and family support)  [] Patient's level of motivation  [] Expected progression of patient      HISTORY:(criteria)    [] 24558 - no personal factors/history    [] 39786 - has 1-2 personal factor/comorbidity     [] 73429 - has >3 personal factor/comorbidity     Body Regions:  [] Objective examination findings  [] Head     []   Neck  [] Trunk   [] Upper Extremity  [] Lower Extremity    Body Systems:  [] For communication ability, affect, cognition, language, and learning style: the assessment of the ability to make needs known, consciousness, orientation (person, place, and time), expected emotional /behavioral responses, and learning preferences (eg, learning barriers, education  needs)  [] For the neuromuscular system: a general assessment of gross coordinated movement (eg, balance, gait, locomotion, transfers, and transitions) and motor function  (motor control and motor learning)  [] For the musculoskeletal system: the assessment of gross symmetry, gross range of motion, gross strength, height, and weight  [] For the integumentary system: the assessment of pliability(texture), presence of scar formation, skin color, and skin integrity  [] For cardiovascular/pulmonary system: the assessment of heart rate, respiratory rate, blood pressure, and edema     Activity limitations:    [] Patient's cognitive status and saf ety concerns          [] Status of current condition      [] Weight bearing restriction  [] Cardiopulmunary Restriction    Participation Restrictions:   [] Goals and goal agreement with the patient     [] Rehab potential (prognosis) and probable outcome      Examination of Body System: (criteria)    [] 81818 - addressing 1-2 elements    [] 85485 - addressing a total of 3 or more elements     [] 43236 -  Addressing a total of 4 or more elements         Clinical Presentation: (criteria)  Choose one     On examination of body system using standardized tests and measures patient presents with (CHOOSE ONE) elements from any of the following: body structures and functions, activity limitations, and/or participation restrictions.  Leading to a clinical presentation that is considered (CHOOSE ONE)                              Clinical Decision Making  (Eval Complexity):  Choose One     Time Tracking:     PT Received On: 09/05/18  PT  Start Time: 1455     PT Stop Time: 1608  PT Total Time (min): 73 min     Billable Minutes: Evaluation 13, Gait Training 30 and Therapeutic Activity 30      Thomas Macdonald, PT  09/05/2018

## 2018-09-05 NOTE — PLAN OF CARE
"Patient states that he lives with his spouse, and "a young couple with 3 kids, ages 13, 14, 15"; patient states the couple are not relatives.  Denies HH; had [facility] in the past.  Patient states that he ambulates around the home, and outside with his walker.  Patient states that he does try to feed himself, although he shakes a lot due to Parkinson's.  Patient states that if he eats meat, his wife cuts up the meat.  Spouse does the cooking, cleaning, driving.  [facility].  Discharge plan is home; possibly with HH.       09/05/18 1134   Discharge Assessment   Assessment Type Discharge Planning Assessment   Confirmed/corrected address and phone number on facesheet? Yes   Assessment information obtained from? Patient   Prior to hospitilization cognitive status: Alert/Oriented   Prior to hospitalization functional status: Needs Assistance;Assistive Equipment   Current cognitive status: Alert/Oriented   Current Functional Status: Needs Assistance;Assistive Equipment   Lives With spouse;other (see comments)   Able to Return to Prior Arrangements yes   Is patient able to care for self after discharge? No   Patient's perception of discharge disposition home or selfcare   Readmission Within The Last 30 Days no previous admission in last 30 days   Patient currently being followed by outpatient case management? No   Patient currently receives any other outside agency services? No   Equipment Currently Used at Home bath bench;3-in-1 commode;walker, rolling   Do you have any problems affording any of your prescribed medications? No   Is the patient taking medications as prescribed? yes   Does the patient have transportation home? Yes   Transportation Available family or friend will provide   Dialysis Name and Scheduled days none   Does the patient receive services at the [facility]? No   Discharge Plan A Home   Discharge Plan B Home Health   Patient/Family In Agreement With Plan yes     "

## 2018-09-05 NOTE — PLAN OF CARE
Problem: Nutrition, Imbalanced: Inadequate Oral Intake (Adult)  Goal: Improved Oral Intake  Patient will demonstrate the desired outcomes by discharge/transition of care.  Recommendation/Intervention: 1) Continue regular diet 2) allow family to bring in food from home, and nutritional shakes 3) Trial Boost Plus, continue to send daily 4) snacks per pt preference 5) If Pt not eating 75% of meals, consider supplementall TF  Goals: 1) PO intakes > 50% of meals and supplements 2) Pt meets > 75% estimated needs by f/u  Nutrition Goal Status: new  Communication of RD Recs: reviewed with physician

## 2018-09-05 NOTE — ASSESSMENT & PLAN NOTE
Patient with STEWART likely d/t Rhabdomyolysis  Which is currently stable. Labs reviewed- BMP with Estimated Creatinine Clearance: 36.8 mL/min (based on SCr of 1.3 mg/dL). according to latest data. Monitor UOP and serial BMP and adjust therapy as needed. Avoid nephrotoxins and renally dose meds for GFR listed above.

## 2018-09-05 NOTE — SUBJECTIVE & OBJECTIVE
Past Medical History:   Diagnosis Date    Anxiety 1/25/2012    Arthritis of knee 1/25/2012    Back pain 1/25/2012    BPH (benign prostatic hyperplasia) 1/25/2012    Essential tremor 3/19/2014    Kyphoscoliosis 1/25/2012    Parkinson's disease        Past Surgical History:   Procedure Laterality Date    APPENDECTOMY      FRACTURE SURGERY Left Dec 18, 2014    HEMORRHOID SURGERY      PROSTATE SURGERY  2008    TURP and had renal insu from obsr    TONSILLECTOMY         Review of patient's allergies indicates:  No Known Allergies    No current facility-administered medications on file prior to encounter.      Current Outpatient Medications on File Prior to Encounter   Medication Sig    carbidopa-levodopa  mg (SINEMET)  mg per tablet Take 2 tablets by mouth 3 (three) times daily.     clonazePAM (KLONOPIN) 2 MG Tab TAKE ONE TABLET BY MOUTH AT BEDTIME AS NEEDED (Patient taking differently: TAKE ONE TABLET BY MOUTH AT BEDTIME AS NEEDED insomnia)    losartan (COZAAR) 50 MG tablet Take 1 tablet (50 mg total) by mouth once daily.    oxycodone-acetaminophen (PERCOCET) 5-325 mg per tablet Take 1 tablet by mouth every 4 (four) hours as needed for Pain.     [DISCONTINUED] carbidopa-levodopa  mg (SINEMET)  mg per tablet     [DISCONTINUED] walker Misc 1 each by Misc.(Non-Drug; Combo Route) route continuous. Rolling walker     Family History     Problem Relation (Age of Onset)    Heart disease Mother (79)    Parkinsonism Father (83)        Tobacco Use    Smoking status: Former Smoker    Smokeless tobacco: Never Used   Substance and Sexual Activity    Alcohol use: No    Drug use: No    Sexual activity: Not on file     Review of Systems   Unable to perform ROS: Mental status change     Objective:     Vital Signs (Most Recent):  Temp: 99.1 °F (37.3 °C) (09/04/18 2024)  Pulse: 105 (09/04/18 2024)  Resp: 14 (09/04/18 2024)  BP: (!) 168/99 (09/04/18 2024)  SpO2: 100 % (09/04/18 2024) Vital Signs  (24h Range):  Temp:  [99.1 °F (37.3 °C)-101 °F (38.3 °C)] 99.1 °F (37.3 °C)  Pulse:  [] 105  Resp:  [14-20] 14  SpO2:  [98 %-100 %] 100 %  BP: (108-168)/(58-99) 168/99     Weight: 54.6 kg (120 lb 5 oz)  Body mass index is 19.42 kg/m².    Physical Exam   Constitutional: No distress.   Thin, frail elderly man who is lethargic but answers questions appropriately.    HENT:   Head: Normocephalic.   Mouth/Throat: Oropharynx is clear and moist.   Temporal wasting   Eyes: Conjunctivae are normal. Right eye exhibits no discharge. Left eye exhibits no discharge. No scleral icterus.   Neck: No JVD present.   Cardiovascular: Normal rate, regular rhythm, normal heart sounds and intact distal pulses. Exam reveals no friction rub.   No murmur heard.  Pulmonary/Chest: Effort normal and breath sounds normal. No respiratory distress. He has no wheezes.   Abdominal: Soft. Bowel sounds are normal. He exhibits no distension. There is no tenderness.   Musculoskeletal: Normal range of motion. He exhibits no edema.   LUE with petechial hemorrhage and swelling c/w tourniquet injury.    Lymphadenopathy:     He has no cervical adenopathy.   Neurological: He has normal reflexes.   Lethargic but arousable. Answers questions appropriately. Noted resting tremor in upper extremities.   Skin: No rash noted. He is not diaphoretic. No erythema.   Psychiatric: He has a normal mood and affect. His behavior is normal.   Nursing note and vitals reviewed.          Significant Labs: All pertinent labs within the past 24 hours have been reviewed.    Significant Imaging: I have reviewed all pertinent imaging results/findings within the past 24 hours.

## 2018-09-05 NOTE — PLAN OF CARE
Problem: Occupational Therapy Goal  Goal: Occupational Therapy Goal  Goals to be met by: 9/13/18     Patient will increase functional independence with ADLs by performing:    Feeding with Set-up Assistance, Contact Guard Assistance and Assistive Devices as needed.  Grooming while seated with Set-up Assistance, Minimal Assistance and Assistive Devices as needed.  Toileting from bedside commode with Set-up Assistance, Moderate Assistance and Assistive Devices as needed for hygiene and clothing management.   Toilet transfer to bedside commode with Moderate Assistance.  Upper extremity exercise program x10 reps per handout, with assistance as needed.    Outcome: Ongoing (interventions implemented as appropriate)  OT evaluation completed today. Goals & care plan established.    CONSTANTINE Jolly  9/5/2018

## 2018-09-05 NOTE — PLAN OF CARE
Problem: Patient Care Overview  Goal: Plan of Care Review  Outcome: Ongoing (interventions implemented as appropriate)  POC reviewed with pt with verbalized understanding. Free from falls, safety maintained, VSS. Fluids initiated. AAOx4. Left arm swollen and red, no tenderness at this time. Call light in reach, bed locked/lowest position. Srx2. Will continue to monitor.

## 2018-09-05 NOTE — PLAN OF CARE
09/05/18 0850   Patient Assessment/Suction   Level of Consciousness (AVPU) alert   PRE-TX-O2-ETCO2   O2 Device (Oxygen Therapy) room air   SpO2 96 %   Pulse Oximetry Type Intermittent   $ Pulse Oximetry - Multiple Charge Pulse Oximetry - Multiple

## 2018-09-05 NOTE — ED NOTES
Upon transfer to room 218, patient is AAOx4, no cardiac or respiratory complications. No needs or questions at this time. No needs or questions at this time.

## 2018-09-05 NOTE — ASSESSMENT & PLAN NOTE
Likely d/t renal failure versus sinemet toxicity. Encephalopathy noted as a secondary process related to the patient's acute illness. There is no specific treatment. Monitor neuro status, avoid medications such as narcotics and benzos which may worsen confusion, and use anti-psychotics to prevent behaviors of self harm.

## 2018-09-05 NOTE — PROGRESS NOTES
Pharmacist Renal Dose Adjustment Note    Toni Kamara is a 78 y.o. male being treated with the medication enoxaparin    Patient Data:    Vital Signs (Most Recent):  Temp: 98.2 °F (36.8 °C) (09/05/18 1200)  Pulse: 71 (09/05/18 1200)  Resp: 16 (09/05/18 1200)  BP: 131/61 (09/05/18 1200)  SpO2: 95 % (09/05/18 1200) Vital Signs (72h Range):  Temp:  [96.5 °F (35.8 °C)-101 °F (38.3 °C)]   Pulse:  []   Resp:  [14-20]   BP: (108-168)/(56-99)   SpO2:  [94 %-100 %]      Recent Labs   Lab  09/04/18   1445  09/05/18   0621   CREATININE  1.3  1.2     Serum creatinine: 1.2 mg/dL 09/05/18 0621  Estimated creatinine clearance: 38.9 mL/min    Medication:enoxaparin 40mg BID will be changed to medication:enoxaparin 40mg DAILY for CrCl>30 and BMI <40    Pharmacist's Name: Iman Ramos

## 2018-09-05 NOTE — PT/OT/SLP EVAL
"Occupational Therapy   Evaluation    Name: Toni Kamara  MRN: [id number]  Admitting Diagnosis:  Rhabdomyolysis      Recommendations:     Discharge Recommendations: home health PT, home health OT  Discharge Equipment Recommendations:  bath bench, walker, rolling  Barriers to discharge:  None    History:     Occupational Profile:  Living Environment: Pt lives with his wife and another family in a [facility] with 0 [address].  Previous level of function: Pt had a caregiver 8 hrs/day to assist with self care. Pt needed limited assistance with UB ADL's including self feeding and increased assistance with LB dressing and bathing. He reports being able to perform toileting Mod I with BSC nearby. He used a rollator but stated he often walked without it and has had "a string of falls."  Roles and Routines:   Equipment Used at Home:  rollator, 3-in-1 commode  Assistance upon Discharge: Pt will have help from his caregiver and family.    Past Medical History:   Diagnosis Date    Anxiety 1/25/2012    Arthritis of knee 1/25/2012    Back pain 1/25/2012    BPH (benign prostatic hyperplasia) 1/25/2012    Essential tremor 3/19/2014    Kyphoscoliosis 1/25/2012    Parkinson's disease        Past Surgical History:   Procedure Laterality Date    APPENDECTOMY      FRACTURE SURGERY Left Dec 18, 2014    HEMORRHOID SURGERY      PROSTATE SURGERY  2008    TURP and had renal insu from obsr    TONSILLECTOMY         Subjective     Chief Complaint: L hand pain when it is touched.  Patient/Family Comments/goals: To go home.    Pain/Comfort:  · Pain Rating 1: 0/10  · Location - Side 1: Left  · Location - Orientation 1: generalized  · Location 1: hand  · Pain Addressed 1: Cessation of Activity(Pt reported fleeting pain with OT touching fingers.)  · Pain Rating Post-Intervention 1: 0/10    Patients cultural, spiritual, Jainism conflicts given the current situation:      Objective:     Communicated with: nurse Harlan prior to session.  Patient " found with HOB at 30* and telemetry upon OT entry to room.    General Precautions: Standard, fall, aphasia   Orthopedic Precautions:N/A   Braces: N/A     Occupational Performance:    Bed Mobility:    · Refused all bed mobility, including being repositioned in bed.    Functional Mobility/Transfers:  · Refused to perform.    Activities of Daily Living:  · Feeding:  minimum assistance and set-up to grasp a small cup and bring to mouth to sip. OT had to hold straw still for pt and place lid on cup to avoid spillage. Tremor noted with task.  · Grooming: set-up to wipe face with a warm washcloth with R hand in bed.      Cognitive/Visual Perceptual:  Cognitive/Psychosocial Skills:     -       Oriented to: Person, Place, Situation and year, not month   -       Follows Commands/attention:Follows two-step commands  -       Communication: low voice volume  -       Memory: Poor immediate recall  -       Safety awareness/insight to disability: impaired   -       Mood/Affect/Coping skills/emotional control: Appropriate to situation, Pleasant and Limited cooperation  Visual/Perceptual:      -Intact      Physical Exam:  Postural examination/scapula alignment:    -       Rounded shoulders  -       Forward head  -       Posterior pelvic tilt  -       Lateral weight shift of hips  Skin integrity: L hand and forearm inflamed with petichiae  Edema:  Moderate L hand and distal forearm  Sensation:    -       Impaired  light/touch L hand and forearm    Dominant hand:    -       Right  Upper Extremity Range of Motion:     -       Right Upper Extremity: WFL    -       Left Upper Extremity: Deficits: hand and wrist flexion and extension limited by pain and edema; able to oppose thumb to index finger only; shouler and elbow WFL      Upper Extremity Strength:    -       Right Upper Extremity: WFL  -       Left Upper Extremity: shoulder WFL; elbow flexion and extension 4/5; hand and wrist NT 2/2 pain and edema     Strength:    -       Right  Upper Extremity: WFL    -       Left Upper Extremity: unable to form even a loose  2/2 edema and pain    Fine Motor Coordination:    -       Impaired in both hands 2/2 resting tremor and more so in L hand 2/2 pain and edema:   Left hand, finger to nose  , Right hand, finger to nose  , Left hand thumb/finger opposition skills  , Right hand thumb/finger opposition skills  , Left hand, manipulation of objects  , Right hand, manipulation of objects  , Left hand, graphomotor skills   and Right hand, graphomotor skills      AMPAC 6 Click ADL:  AMPAC Total Score: 13    Treatment & Education:  OT ed pt on OT role & POC as well as discharge recommendations.  OT ed family on keeping HOB raised as pt will tolerate to counteract effects of bedrest.  OT ed pt on repositioning HOB upright and on midline sitting for self feeding to increase independence & reduce risk of aspiration with task.  Pt pulled himself to midline but would not allow himself to be repositioned to HOB first.  OT provided pt information on oscillating self feeding utensil to steady hand with self feeding tasks and on weighted glove to steady hand for other tasks. OT also ed pt on use of wrist cuff weights and stabilization of upper arm to minimize tremors and spillage of food. OT also communicated to nurse Harlan to place lids and straws on all cups for pt to minimize spillage.  OT ed pt on performance of L finger flexion PROM exercises to work towards forming a grasp. Demonstration provided and pt able to complete exercise with frequent cues and demo.  Education:    Patient left HOB elevated with all lines intact, call button in reach and nurse Harlan notified    Assessment:     Toni Kamara is a 78 y.o. male with a medical diagnosis of Rhabdomyolysis.  He presents with the following performance deficits affecting function: weakness, impaired self care skills, pain, L hand and wrist edema and impaired sensation, impaired skin, decreased ROM, impaired joint  "extensibility, decreased upper extremity function, impaired coordination, impaired endurance, impaired functional mobilty, abnormal tone.  Pt displayed limited cooperation and insight into his current status. Recommend OT treatment to maximize endurance, safety & independence with ADL's & functional mobility. Pt will need extensive assistance for self-care at discharge.      Rehab Prognosis: Fair; patient would benefit from acute skilled OT services to address these deficits and reach maximum level of function.         Clinical Decision Making:     3.  OT High:  "Pt evaluation falls under high complexity for evaluation category due to 5+ performance deficits identified with comprehensive assessments and significant modifications/assistance required. An expanded review of history and occupational profile completed in addition to expanded review of physical, cognitive and psychosocial history. Several treatment options considered in care."     Plan:     Patient to be seen 3 x/week to address the above listed problems via self-care/home management, therapeutic activities, therapeutic exercises  · Plan of Care Expires: 10/04/18  · Plan of Care Reviewed with: patient    This Plan of care has been discussed with the patient who was involved in its development and understands and is in agreement with the identified goals and treatment plan    GOALS:   Multidisciplinary Problems     Occupational Therapy Goals        Problem: Occupational Therapy Goal    Goal Priority Disciplines Outcome Interventions   Occupational Therapy Goal     OT, PT/OT Ongoing (interventions implemented as appropriate)    Description:  Goals to be met by: 9/13/18     Patient will increase functional independence with ADLs by performing:    Feeding with Set-up Assistance, Contact Guard Assistance and Assistive Devices as needed.  Grooming while seated with Set-up Assistance, Minimal Assistance and Assistive Devices as needed.  Toileting from bedside " commode with Set-up Assistance, Moderate Assistance and Assistive Devices as needed for hygiene and clothing management.   Toilet transfer to bedside commode with Moderate Assistance.  Upper extremity exercise program x10 reps per handout, with assistance as needed.                      Time Tracking:     OT Date of Treatment: 09/05/18  OT Start Time: 0912  OT Stop Time: 1007  OT Total Time (min): 55 min    Billable Minutes:Evaluation 15  Self Care/Home Management 12  Therapeutic Activity 14  Therapeutic Exercise 14    CONSTANTINE Simon  9/5/2018

## 2018-09-06 LAB
ALBUMIN SERPL BCP-MCNC: 2.6 G/DL
ALP SERPL-CCNC: 48 U/L
ALT SERPL W/O P-5'-P-CCNC: 12 U/L
ANION GAP SERPL CALC-SCNC: 8 MMOL/L
AST SERPL-CCNC: 94 U/L
BASOPHILS # BLD AUTO: 0 K/UL
BASOPHILS NFR BLD: 0.2 %
BILIRUB SERPL-MCNC: 0.7 MG/DL
BUN SERPL-MCNC: 30 MG/DL
CALCIUM SERPL-MCNC: 8.2 MG/DL
CHLORIDE SERPL-SCNC: 112 MMOL/L
CK SERPL-CCNC: 1879 U/L
CO2 SERPL-SCNC: 20 MMOL/L
CREAT SERPL-MCNC: 1.1 MG/DL
DIFFERENTIAL METHOD: ABNORMAL
EOSINOPHIL # BLD AUTO: 0 K/UL
EOSINOPHIL NFR BLD: 0.2 %
ERYTHROCYTE [DISTWIDTH] IN BLOOD BY AUTOMATED COUNT: 13.7 %
EST. GFR  (AFRICAN AMERICAN): >60 ML/MIN/1.73 M^2
EST. GFR  (NON AFRICAN AMERICAN): >60 ML/MIN/1.73 M^2
GLUCOSE SERPL-MCNC: 96 MG/DL
HCT VFR BLD AUTO: 32 %
HGB BLD-MCNC: 10.8 G/DL
LYMPHOCYTES # BLD AUTO: 0.9 K/UL
LYMPHOCYTES NFR BLD: 10 %
MAGNESIUM SERPL-MCNC: 1.8 MG/DL
MCH RBC QN AUTO: 31.3 PG
MCHC RBC AUTO-ENTMCNC: 33.8 G/DL
MCV RBC AUTO: 93 FL
MONOCYTES # BLD AUTO: 1 K/UL
MONOCYTES NFR BLD: 10.5 %
NEUTROPHILS # BLD AUTO: 7.4 K/UL
NEUTROPHILS NFR BLD: 79.1 %
PHOSPHATE SERPL-MCNC: 1.7 MG/DL
PLATELET # BLD AUTO: 113 K/UL
PMV BLD AUTO: 8.1 FL
POTASSIUM SERPL-SCNC: 3.6 MMOL/L
PROT SERPL-MCNC: 5.1 G/DL
RBC # BLD AUTO: 3.46 M/UL
SODIUM SERPL-SCNC: 140 MMOL/L
WBC # BLD AUTO: 9.4 K/UL

## 2018-09-06 PROCEDURE — 97116 GAIT TRAINING THERAPY: CPT

## 2018-09-06 PROCEDURE — 97530 THERAPEUTIC ACTIVITIES: CPT

## 2018-09-06 PROCEDURE — 36415 COLL VENOUS BLD VENIPUNCTURE: CPT

## 2018-09-06 PROCEDURE — 80053 COMPREHEN METABOLIC PANEL: CPT

## 2018-09-06 PROCEDURE — 25000003 PHARM REV CODE 250: Performed by: NURSE PRACTITIONER

## 2018-09-06 PROCEDURE — 82550 ASSAY OF CK (CPK): CPT

## 2018-09-06 PROCEDURE — 25000003 PHARM REV CODE 250: Performed by: HOSPITALIST

## 2018-09-06 PROCEDURE — 94761 N-INVAS EAR/PLS OXIMETRY MLT: CPT

## 2018-09-06 PROCEDURE — 63600175 PHARM REV CODE 636 W HCPCS: Performed by: HOSPITALIST

## 2018-09-06 PROCEDURE — 85025 COMPLETE CBC W/AUTO DIFF WBC: CPT

## 2018-09-06 PROCEDURE — 84100 ASSAY OF PHOSPHORUS: CPT

## 2018-09-06 PROCEDURE — 83735 ASSAY OF MAGNESIUM: CPT

## 2018-09-06 PROCEDURE — 12000002 HC ACUTE/MED SURGE SEMI-PRIVATE ROOM

## 2018-09-06 RX ORDER — CARBIDOPA AND LEVODOPA 25; 100 MG/1; MG/1
2 TABLET ORAL 3 TIMES DAILY
Status: DISCONTINUED | OUTPATIENT
Start: 2018-09-06 | End: 2018-09-07 | Stop reason: HOSPADM

## 2018-09-06 RX ORDER — CLONAZEPAM 1 MG/1
2 TABLET ORAL NIGHTLY
Status: DISCONTINUED | OUTPATIENT
Start: 2018-09-06 | End: 2018-09-07 | Stop reason: HOSPADM

## 2018-09-06 RX ADMIN — DOCUSATE SODIUM AND SENNOSIDES 1 TABLET: 50; 8.6 TABLET, FILM COATED ORAL at 09:09

## 2018-09-06 RX ADMIN — CLONAZEPAM 2 MG: 1 TABLET ORAL at 09:09

## 2018-09-06 RX ADMIN — ENOXAPARIN SODIUM 40 MG: 100 INJECTION SUBCUTANEOUS at 04:09

## 2018-09-06 RX ADMIN — CARBIDOPA AND LEVODOPA 1 TABLET: 25; 100 TABLET ORAL at 02:09

## 2018-09-06 RX ADMIN — SODIUM CHLORIDE: 0.9 INJECTION, SOLUTION INTRAVENOUS at 06:09

## 2018-09-06 RX ADMIN — SODIUM PHOSPHATE, MONOBASIC, MONOHYDRATE 20.01 MMOL: 276; 142 INJECTION, SOLUTION INTRAVENOUS at 06:09

## 2018-09-06 RX ADMIN — CARBIDOPA AND LEVODOPA 2 TABLET: 25; 100 TABLET ORAL at 09:09

## 2018-09-06 RX ADMIN — CARBIDOPA AND LEVODOPA 1 TABLET: 25; 100 TABLET ORAL at 08:09

## 2018-09-06 RX ADMIN — ACETAMINOPHEN 1000 MG: 500 TABLET, FILM COATED ORAL at 03:09

## 2018-09-06 NOTE — ASSESSMENT & PLAN NOTE
Appears to be markedly improved and likely related to Sinemet.  Will continue to monitor patient's mental status closely.

## 2018-09-06 NOTE — ASSESSMENT & PLAN NOTE
Patient with STEWART likely d/t Rhabdomyolysis  Which is currently stable. Labs reviewed- BMP with Estimated Creatinine Clearance: 38.9 mL/min (based on SCr of 1.2 mg/dL). according to latest data. Monitor UOP and serial BMP and adjust therapy as needed. Avoid nephrotoxins and renally dose meds for GFR listed above.

## 2018-09-06 NOTE — PROGRESS NOTES
09/06/18 0914   Patient Assessment/Suction   Level of Consciousness (AVPU) alert   Respiratory Effort Normal;Unlabored   PRE-TX-O2-ETCO2   O2 Device (Oxygen Therapy) room air   SpO2 95 %   Pulse Oximetry Type Intermittent   $ Pulse Oximetry - Multiple Charge Pulse Oximetry - Multiple   Pulse 71   Resp 18

## 2018-09-06 NOTE — PLAN OF CARE
Problem: Patient Care Overview  Goal: Plan of Care Review  Outcome: Ongoing (interventions implemented as appropriate)  Bed is in the low position, call light is within reach to maintain fall precautions.  Cardiac is monitored.  Patient has complaints of pain, initially refused pain medication, last rounding patient states he has been unable to sleep because of pain, patient requested PRN pain medication. Patient is resting between care.  Will continue to round and monitor.

## 2018-09-06 NOTE — PLAN OF CARE
Problem: Patient Care Overview  Goal: Plan of Care Review  Outcome: Ongoing (interventions implemented as appropriate)  Fall and safety precautions maintained. VSS. SR on the monitor. NS infusing at 125mL/hr per MD orders. CPK levels are down this AM to 1879 from 3390. Tentative plan is for pt to discharge tomorrow if CPK levels are below 1000. Pt refuses to be turned at times and states he is most comfortable on his back. Multiple family members at bedside throughout the day. Side rails up x2, call light in reach. Will continue to monitor.

## 2018-09-06 NOTE — PROGRESS NOTES
[facility] Hospital Medicine  Progress Note    Patient Name: Toni Kamara  MRN: [id number]  Patient Class: IP- Inpatient   Admission Date: 9/4/2018  Length of Stay: 1 days  Attending Physician: Macario Walton MD  Primary Care Provider: Harinder Stout MD        Subjective:     Principal Problem:Rhabdomyolysis    HPI:  Toni Kamara is a 78 y.o. male with a hx of Parkinson's and BPH who presents to the ED with c/o left arm swelling and pain to the left hand. Pt states he fell while in the bed. His left arm was stuck in the bed rails for approximately 30-45 minutes. Pt denies left wrist pain, left forearm pain and BLE pain. NKDA noted.  Per the wife, who is the primary history provider, the patient has been more lethargic and moaning in pain. She also noted fever of 102.8, which was also noted in the ED. Patient denies cough, neck pain, or abdominal symptoms. Patient was noted in ED to have developed rhabdomyolysis.          Hospital Course:  No notes on file    Interval History: Patient seen and examined.  No acute events overnight.  CPK remains markedly elevated despite IV fluids.  Patient more alert today, but refused a.m. Medications and is refusing to get out of bed.  Plan of care discussed with the patient in detail.    Review of Systems   Constitutional: Positive for activity change, appetite change and fatigue. Negative for chills and fever.   Respiratory: Negative for cough and shortness of breath.    Cardiovascular: Negative for chest pain and leg swelling.   Gastrointestinal: Negative for abdominal pain, nausea and vomiting.   Musculoskeletal: Positive for arthralgias and myalgias. Negative for back pain.   Neurological: Positive for tremors and weakness.   Psychiatric/Behavioral: Negative for confusion. The patient is not nervous/anxious.    All other systems reviewed and are negative.    Objective:     Vital Signs (Most Recent):  Temp: 98.3 °F (36.8 °C) (09/05/18 2020)  Pulse: 69  (09/05/18 2020)  Resp: 16 (09/05/18 2020)  BP: (!) 142/64 (09/05/18 2020)  SpO2: 96 % (09/05/18 2020) Vital Signs (24h Range):  Temp:  [96.5 °F (35.8 °C)-99.1 °F (37.3 °C)] 98.3 °F (36.8 °C)  Pulse:  [] 69  Resp:  [14-20] 16  SpO2:  [94 %-100 %] 96 %  BP: (119-168)/(56-99) 142/64     Weight: 53.4 kg (117 lb 11.6 oz)(75 kg 9/5)  Body mass index is 19 kg/m².    Intake/Output Summary (Last 24 hours) at 9/5/2018 2021  Last data filed at 9/5/2018 1700  Gross per 24 hour   Intake 3292.08 ml   Output --   Net 3292.08 ml      Physical Exam   Constitutional: He is oriented to person, place, and time.   Thin, frail elderly male   Eyes: EOM are normal. Pupils are equal, round, and reactive to light.   Neck: No JVD present.   Cardiovascular: Normal rate, regular rhythm, normal heart sounds and intact distal pulses.   Pulmonary/Chest: Effort normal and breath sounds normal.   Abdominal: Soft. Bowel sounds are normal. He exhibits no distension. There is no tenderness.   Musculoskeletal: He exhibits edema. He exhibits no deformity.   LUE with noted swelling and petechial rash, + TTP   Lymphadenopathy:     He has no cervical adenopathy.   Neurological: He is alert and oriented to person, place, and time.   Noted cogwheel tremor of R hand.   Skin: No rash noted. No pallor.   Nursing note and vitals reviewed.      Significant Labs: All pertinent labs within the past 24 hours have been reviewed.    Significant Imaging: I have reviewed all pertinent imaging results/findings within the past 24 hours.    Assessment/Plan:      * Rhabdomyolysis    Likely d/t crush/tourniquet injury of KIRK when he fell out of the bed and caught his arm in the rail. Will need IV hydration and monitoring or renal function closely in hospital. PT/OT. No need for surgical intervention at the moment. Continue to trend CPK.          Fever    Acute, but source unknown.Etiology possibly infectious versus drug induced or inflammatory d/t tourniquet injury of  arm. Monitor. Hold off ABX currently.          Encephalopathy, metabolic    Appears to be markedly improved and likely related to Sinemet.  Will continue to monitor patient's mental status closely.                Malnutrition of moderate degree    Nutrition consulted. Body mass index is 19.42 kg/m².. Encourage maximal PO intake. Diet supplementation ordered per nutrition approval. Will encourage PO and monitor closely for weight changes.            STEWART (acute kidney injury)    Patient with STEWART likely d/t Rhabdomyolysis  Which is currently stable. Labs reviewed- BMP with Estimated Creatinine Clearance: 38.9 mL/min (based on SCr of 1.2 mg/dL). according to latest data. Monitor UOP and serial BMP and adjust therapy as needed. Avoid nephrotoxins and renally dose meds for GFR listed above.              Essential hypertension    Chronic, controlled.  Monitor BP closely.  Will Hold ARB  medications and add as needed for sustained BP control.            Parkinson disease    Reduce sinemet by 50% d/t lethargy and potential decreased renal excretion. Tremor is mild.             VTE Risk Mitigation (From admission, onward)        Ordered     enoxaparin injection 40 mg  Daily      09/05/18 1340     IP VTE HIGH RISK PATIENT  Once      09/04/18 2005              Macario Walton MD  Department of Hospital Medicine   [facility]

## 2018-09-06 NOTE — PROGRESS NOTES
Patient's Phosphorus level this morning is 1.7, informed MD, order submitted, sodium phosphate 20.01 mmol in 5% dextrose is running at 62.5 ml/hr.

## 2018-09-06 NOTE — PLAN OF CARE
Problem: Physical Therapy Goal  Goal: Physical Therapy Goal  Goals to be met by: 18     Patient will increase functional independence with mobility by performin. Supine to sit with Contact Guard Assistance  2. Sit to supine with Contact Guard Assistance  3. Rolling to Left and Right with Contact Guard Assistance.  4. Sit to stand transfer with Contact Guard Assistance  5. Bed to chair transfer with Contact Guard Assistance using Rolling Walker  6. Gait  x 500 feet with Stand-by Assistance using Rolling Walker.      Outcome: Ongoing (interventions implemented as appropriate)  PT for functional mob training; progressing well

## 2018-09-06 NOTE — HOSPITAL COURSE
78-year-old with history of Parkinson's who presents the hospital status post fall from bed and tourniquet injury of his left upper extremity.  Patient subsequently developed acute kidney injury and rhabdomyolysis and was started on IV fluids.  He also was somewhat lethargic on admission and his Sinemet was decreased secondary to decreased renal clearance.  Patient's mental status improved over the course of his hospitalization and his CPK trended down with IV fluids.  Patient had significant functional deficits particularly to his left upper extremity and PT/OT was consulted in order to assist patient functional decline.  Patient had 1 time fever in the emergency department but no further fevers in the hospital and he was not started on any antibiotics.

## 2018-09-06 NOTE — ASSESSMENT & PLAN NOTE
Likely d/t crush/tourniquet injury of KIRK when he fell out of the bed and caught his arm in the rail. Will need IV hydration and monitoring or renal function closely in hospital. PT/OT. No need for surgical intervention at the moment. Continue to trend CPK.

## 2018-09-06 NOTE — PT/OT/SLP PROGRESS
Physical Therapy  Treatment    Toni Kamara   MRN: [id number]   Admitting Diagnosis: Rhabdomyolysis    PT Received On: 09/06/18  PT Start Time: 0950     PT Stop Time: 1034    PT Total Time (min): 44 min       Billable Minutes:  Gait Training 18 and Therapeutic Activity 26    Treatment Type: Treatment  PT/PTA: PT             General Precautions: Standard, aphasia, fall, hearing impaired  Orthopedic Precautions: N/A   Braces: N/A    Do you have any cultural, spiritual, Muslim conflicts, given your current situation?: none    Subjective:  Communicated with PAO Haddad prior to session.  Pt wanted to walk now, requested to go to the bathroom first.    Pain/Comfort  Pain Rating 1: 0/10  Pain Rating Post-Intervention 1: 0/10    Objective:   Patient found with: telemetry, peripheral IV    Functional Mobility:  Bed Mobility: CGA with rolling; mod A with supine to sit with verbal and tactile cues       Transfers: sit <->stand with mod A with verbal and tactile cues; bathroom transfers with mod / max A, using a RW and rails       Gait: 500 ft with a RW min A with no rest break, kyphotic, fwd head posture, pt tends to take short quick steps, corrected verbally; unsteady gait with decreased safety awareness       AM-PAC 6 CLICK MOBILITY  How much help from another person does this patient currently need?   1 = Unable, Total/Dependent Assistance  2 = A lot, Maximum/Moderate Assistance  3 = A little, Minimum/Contact Guard/Supervision  4 = None, Modified [place]/Independent    Turning over in bed (including adjusting bedclothes, sheets and blankets)?: 4  Sitting down on and standing up from a chair with arms (e.g., wheelchair, bedside commode, etc.): 3  Moving from lying on back to sitting on the side of the bed?: 3  Moving to and from a bed to a chair (including a wheelchair)?: 2  Need to walk in hospital room?: 3  Climbing 3-5 steps with a railing?: 1  Basic Mobility Total Score: 16    AM-PAC Raw Score CMS G-Code Modifier  Level of Impairment Assistance   6 % Total / Unable   7 - 9 CM 80 - 100% Maximal Assist   10 - 14 CL 60 - 80% Moderate Assist   15 - 19 CK 40 - 60% Moderate Assist   20 - 22 CJ 20 - 40% Minimal Assist   23 CI 1-20% SBA / CGA   24 CH 0% Independent/ Mod I     Patient left up in chair with all lines intact, call button in reach, RN Catie notified and student RN present; pt was instructed to call for help and not to stand on his own, pt verbalized understanding.    Assessment:  Toni Kamara is a 78 y.o. male with a medical diagnosis of Rhabdomyolysis.    Rehab identified problem list/impairments: Rehab identified problem list/impairments: weakness, impaired functional mobilty, impaired endurance, gait instability, impaired balance, impaired self care skills, decreased lower extremity function, decreased upper extremity function, decreased coordination, impaired cognition, decreased safety awareness, impaired coordination, impaired cardiopulmonary response to activity    Rehab potential is good.    Activity tolerance: Good    Discharge recommendations: Discharge Facility/Level Of Care Needs: home health PT     Barriers to discharge:      Equipment recommendations: Equipment Needed After Discharge: walker, rolling, bath bench     GOALS:   Multidisciplinary Problems     Physical Therapy Goals        Problem: Physical Therapy Goal    Goal Priority Disciplines Outcome Goal Variances Interventions   Physical Therapy Goal     PT, PT/OT Ongoing (interventions implemented as appropriate)     Description:  Goals to be met by: 18     Patient will increase functional independence with mobility by performin. Supine to sit with Contact Guard Assistance  2. Sit to supine with Contact Guard Assistance  3. Rolling to Left and Right with Contact Guard Assistance.  4. Sit to stand transfer with Contact Guard Assistance  5. Bed to chair transfer with Contact Guard Assistance using Rolling Walker  6. Gait  x 500 feet  with Stand-by Assistance using Rolling Walker.                       PLAN:    Patient to be seen daily  to address the above listed problems via gait training, therapeutic activities, therapeutic exercises  Plan of Care expires: 09/19/18  Plan of Care reviewed with: patient    PT G-Codes  Functional Assessment Tool Used: -PAC  Score: 16    Thomas Macdonald, ANSHU  09/06/2018

## 2018-09-06 NOTE — SUBJECTIVE & OBJECTIVE
Interval History: Patient seen and examined.  No acute events overnight.  CPK remains markedly elevated despite IV fluids.  Patient more alert today, but refused a.m. Medications and is refusing to get out of bed.  Plan of care discussed with the patient in detail.    Review of Systems   Constitutional: Positive for activity change, appetite change and fatigue. Negative for chills and fever.   Respiratory: Negative for cough and shortness of breath.    Cardiovascular: Negative for chest pain and leg swelling.   Gastrointestinal: Negative for abdominal pain, nausea and vomiting.   Musculoskeletal: Positive for arthralgias and myalgias. Negative for back pain.   Neurological: Positive for tremors and weakness.   Psychiatric/Behavioral: Negative for confusion. The patient is not nervous/anxious.    All other systems reviewed and are negative.    Objective:     Vital Signs (Most Recent):  Temp: 98.3 °F (36.8 °C) (09/05/18 2020)  Pulse: 69 (09/05/18 2020)  Resp: 16 (09/05/18 2020)  BP: (!) 142/64 (09/05/18 2020)  SpO2: 96 % (09/05/18 2020) Vital Signs (24h Range):  Temp:  [96.5 °F (35.8 °C)-99.1 °F (37.3 °C)] 98.3 °F (36.8 °C)  Pulse:  [] 69  Resp:  [14-20] 16  SpO2:  [94 %-100 %] 96 %  BP: (119-168)/(56-99) 142/64     Weight: 53.4 kg (117 lb 11.6 oz)(75 kg 9/5)  Body mass index is 19 kg/m².    Intake/Output Summary (Last 24 hours) at 9/5/2018 2021  Last data filed at 9/5/2018 1700  Gross per 24 hour   Intake 3292.08 ml   Output --   Net 3292.08 ml      Physical Exam   Constitutional: He is oriented to person, place, and time.   Thin, frail elderly male   Eyes: EOM are normal. Pupils are equal, round, and reactive to light.   Neck: No JVD present.   Cardiovascular: Normal rate, regular rhythm, normal heart sounds and intact distal pulses.   Pulmonary/Chest: Effort normal and breath sounds normal.   Abdominal: Soft. Bowel sounds are normal. He exhibits no distension. There is no tenderness.   Musculoskeletal: He  exhibits edema. He exhibits no deformity.   LUE with noted swelling and petechial rash, + TTP   Lymphadenopathy:     He has no cervical adenopathy.   Neurological: He is alert and oriented to person, place, and time.   Noted cogwheel tremor of R hand.   Skin: No rash noted. No pallor.   Nursing note and vitals reviewed.      Significant Labs: All pertinent labs within the past 24 hours have been reviewed.    Significant Imaging: I have reviewed all pertinent imaging results/findings within the past 24 hours.

## 2018-09-07 VITALS
TEMPERATURE: 99 F | SYSTOLIC BLOOD PRESSURE: 158 MMHG | WEIGHT: 117.75 LBS | BODY MASS INDEX: 18.92 KG/M2 | DIASTOLIC BLOOD PRESSURE: 75 MMHG | HEIGHT: 66 IN | OXYGEN SATURATION: 94 % | HEART RATE: 59 BPM | RESPIRATION RATE: 20 BRPM

## 2018-09-07 LAB
ALBUMIN SERPL BCP-MCNC: 2.7 G/DL
ALP SERPL-CCNC: 57 U/L
ALT SERPL W/O P-5'-P-CCNC: 10 U/L
ANION GAP SERPL CALC-SCNC: 7 MMOL/L
AST SERPL-CCNC: 63 U/L
BASOPHILS # BLD AUTO: 0.1 K/UL
BASOPHILS NFR BLD: 0.7 %
BILIRUB SERPL-MCNC: 0.6 MG/DL
BUN SERPL-MCNC: 21 MG/DL
CALCIUM SERPL-MCNC: 8.6 MG/DL
CHLORIDE SERPL-SCNC: 110 MMOL/L
CK SERPL-CCNC: 888 U/L
CO2 SERPL-SCNC: 25 MMOL/L
CREAT SERPL-MCNC: 1 MG/DL
DIFFERENTIAL METHOD: ABNORMAL
EOSINOPHIL # BLD AUTO: 0.1 K/UL
EOSINOPHIL NFR BLD: 0.8 %
ERYTHROCYTE [DISTWIDTH] IN BLOOD BY AUTOMATED COUNT: 13.9 %
EST. GFR  (AFRICAN AMERICAN): >60 ML/MIN/1.73 M^2
EST. GFR  (NON AFRICAN AMERICAN): >60 ML/MIN/1.73 M^2
GLUCOSE SERPL-MCNC: 127 MG/DL
HCT VFR BLD AUTO: 34.5 %
HGB BLD-MCNC: 11.6 G/DL
LYMPHOCYTES # BLD AUTO: 1 K/UL
LYMPHOCYTES NFR BLD: 13.9 %
MAGNESIUM SERPL-MCNC: 1.6 MG/DL
MCH RBC QN AUTO: 31 PG
MCHC RBC AUTO-ENTMCNC: 33.5 G/DL
MCV RBC AUTO: 93 FL
MONOCYTES # BLD AUTO: 0.7 K/UL
MONOCYTES NFR BLD: 9.6 %
NEUTROPHILS # BLD AUTO: 5.4 K/UL
NEUTROPHILS NFR BLD: 75 %
PHOSPHATE SERPL-MCNC: 1.9 MG/DL
PLATELET # BLD AUTO: 126 K/UL
PMV BLD AUTO: 9 FL
POTASSIUM SERPL-SCNC: 3.7 MMOL/L
PROT SERPL-MCNC: 5.5 G/DL
RBC # BLD AUTO: 3.73 M/UL
SODIUM SERPL-SCNC: 142 MMOL/L
WBC # BLD AUTO: 7.2 K/UL

## 2018-09-07 PROCEDURE — 83735 ASSAY OF MAGNESIUM: CPT

## 2018-09-07 PROCEDURE — 97530 THERAPEUTIC ACTIVITIES: CPT

## 2018-09-07 PROCEDURE — 82550 ASSAY OF CK (CPK): CPT

## 2018-09-07 PROCEDURE — 25000003 PHARM REV CODE 250: Performed by: HOSPITALIST

## 2018-09-07 PROCEDURE — 36415 COLL VENOUS BLD VENIPUNCTURE: CPT

## 2018-09-07 PROCEDURE — 97116 GAIT TRAINING THERAPY: CPT

## 2018-09-07 PROCEDURE — 85025 COMPLETE CBC W/AUTO DIFF WBC: CPT

## 2018-09-07 PROCEDURE — 80053 COMPREHEN METABOLIC PANEL: CPT

## 2018-09-07 PROCEDURE — 84100 ASSAY OF PHOSPHORUS: CPT

## 2018-09-07 PROCEDURE — 63600175 PHARM REV CODE 636 W HCPCS: Performed by: HOSPITALIST

## 2018-09-07 PROCEDURE — 94761 N-INVAS EAR/PLS OXIMETRY MLT: CPT

## 2018-09-07 RX ADMIN — DOCUSATE SODIUM AND SENNOSIDES 1 TABLET: 50; 8.6 TABLET, FILM COATED ORAL at 10:09

## 2018-09-07 RX ADMIN — CARBIDOPA AND LEVODOPA 2 TABLET: 25; 100 TABLET ORAL at 03:09

## 2018-09-07 RX ADMIN — Medication 400 MG: at 07:09

## 2018-09-07 RX ADMIN — ACETAMINOPHEN 1000 MG: 500 TABLET, FILM COATED ORAL at 10:09

## 2018-09-07 RX ADMIN — ENOXAPARIN SODIUM 40 MG: 100 INJECTION SUBCUTANEOUS at 04:09

## 2018-09-07 RX ADMIN — CARBIDOPA AND LEVODOPA 2 TABLET: 25; 100 TABLET ORAL at 10:09

## 2018-09-07 NOTE — PLAN OF CARE
09/07/18 0730   Patient Assessment/Suction   Level of Consciousness (AVPU) alert   Respiratory Effort Unlabored;Normal   Expansion/Accessory Muscles/Retractions no retractions;no use of accessory muscles   All Lung Fields Breath Sounds diminished;clear   PRE-TX-O2-ETCO2   O2 Device (Oxygen Therapy) room air   SpO2 96 %   Pulse Oximetry Type Intermittent   $ Pulse Oximetry - Multiple Charge Pulse Oximetry - Multiple

## 2018-09-07 NOTE — SUBJECTIVE & OBJECTIVE
Interval History: Patient seen and examined.  No acute events overnight.  CPK improved. Renal function improved. Discussed with patient and family.    Review of Systems   Constitutional: Positive for activity change, appetite change and fatigue. Negative for chills and fever.   Respiratory: Negative for cough and shortness of breath.    Cardiovascular: Negative for chest pain and leg swelling.   Gastrointestinal: Negative for abdominal pain, nausea and vomiting.   Musculoskeletal: Positive for arthralgias and myalgias. Negative for back pain.   Neurological: Positive for tremors and weakness.   Psychiatric/Behavioral: Negative for confusion. The patient is not nervous/anxious.    All other systems reviewed and are negative.    Objective:     Vital Signs (Most Recent):  Temp: 99.1 °F (37.3 °C) (09/06/18 2005)  Pulse: 70 (09/06/18 2005)  Resp: 20 (09/06/18 2005)  BP: (!) 125/95 (09/06/18 2005)  SpO2: 95 % (09/06/18 2005) Vital Signs (24h Range):  Temp:  [96.8 °F (36 °C)-99.1 °F (37.3 °C)] 99.1 °F (37.3 °C)  Pulse:  [60-77] 70  Resp:  [16-20] 20  SpO2:  [91 %-98 %] 95 %  BP: (119-143)/(63-95) 125/95     Weight: 53.4 kg (117 lb 11.6 oz)(75 kg 9/5)  Body mass index is 19 kg/m².    Intake/Output Summary (Last 24 hours) at 9/6/2018 2006  Last data filed at 9/6/2018 1837  Gross per 24 hour   Intake 2430 ml   Output --   Net 2430 ml      Physical Exam   Constitutional: He is oriented to person, place, and time.   Thin, frail elderly male   Eyes: EOM are normal. Pupils are equal, round, and reactive to light.   Neck: No JVD present.   Cardiovascular: Normal rate, regular rhythm, normal heart sounds and intact distal pulses.   Pulmonary/Chest: Effort normal and breath sounds normal.   Abdominal: Soft. Bowel sounds are normal. He exhibits no distension. There is no tenderness.   Musculoskeletal: He exhibits edema. He exhibits no deformity.   LUE with noted swelling and petechial rash, + TTP   Lymphadenopathy:     He has no  cervical adenopathy.   Neurological: He is alert and oriented to person, place, and time.   Noted cogwheel tremor of R hand.   Skin: No rash noted. No pallor.   Nursing note and vitals reviewed.      Significant Labs: All pertinent labs within the past 24 hours have been reviewed.    Significant Imaging: I have reviewed all pertinent imaging results/findings within the past 24 hours.

## 2018-09-07 NOTE — PROGRESS NOTES
Replacement 800 mg replacement magnesiuim ordered. Pt refused only took 400 mg. States it will cause him diarrhea.

## 2018-09-07 NOTE — PLAN OF CARE
09/07/18 1439   Final Note   Assessment Type Final Discharge Note   Discharge Disposition Home-Health

## 2018-09-07 NOTE — PT/OT/SLP PROGRESS
Physical Therapy Treatment    Patient Name:  Toni Kamara   MRN:  [id number]    Recommendations:     Discharge Recommendations:      Discharge Equipment Recommendations: walker, rolling   Barriers to discharge: None    Assessment:     Toni Kamara is a 78 y.o. male admitted with a medical diagnosis of Rhabdomyolysis.  He presents with the following impairments/functional limitations:  weakness, impaired endurance, impaired self care skills, impaired functional mobilty, gait instability, impaired balance, decreased lower extremity function, decreased upper extremity function, decreased safety awareness, impaired coordination .    Rehab Prognosis: good; patient would benefit from acute skilled PT services to address these deficits and reach maximum level of function.      Recent Surgery: * No surgery found *      Plan:     During this hospitalization, patient to be seen daily to address the above listed problems via gait training, therapeutic activities, therapeutic exercises  · Plan of Care Expires:  09/19/18   Plan of Care Reviewed with: patient    Subjective     Communicated with nurse Mackay prior to session.  Patient found supine  upon PT entry to room, agreeable to treatment.      Chief Complaint: L wrist pain with pressure.  Patient comments/goals: to return home  Pain/Comfort:  · Pain Rating 1: other (see comments)(did not rate)  · Location - Side 1: Left  · Location 1: hand  · Pain Addressed 1: Reposition, Nurse notified    Patients cultural, spiritual, Zoroastrianism conflicts given the current situation: none    Objective:     Patient found with: telemetry, peripheral IV, bed alarm     General Precautions: Standard, fall, hearing impaired   Orthopedic Precautions:N/A   Braces: N/A     Functional Mobility:  · Bed Mobility:     · Rolling Left:  minimum assistance  · Rolling Right: minimum assistance  · Supine to Sit: moderate assistance  · Sit to Supine: moderate assistance  · Transfers:     · Sit to Stand:   moderate assistance with rolling walker  · Gait: 240' x 3 with rw and Min A .      AM-PAC 6 CLICK MOBILITY          Therapeutic Activities and Exercises:   Transferred to sitting EOB with A and extra time. Required A to scoot forward to feet flat. Unsteady upon standing leaning posterior. Ambulated steadily in hallway with rw with shuffling steps initially ,able to correct with verbal cues. Tremulous UE's noted with activity. Returned to bed with A .    Patient left supine with all lines intact, call button in reach, bed alarm on and nurse Codie notified..    GOALS:   Multidisciplinary Problems     Physical Therapy Goals        Problem: Physical Therapy Goal    Goal Priority Disciplines Outcome Goal Variances Interventions   Physical Therapy Goal     PT, PT/OT Ongoing (interventions implemented as appropriate)     Description:  Goals to be met by: 18     Patient will increase functional independence with mobility by performin. Supine to sit with Contact Guard Assistance  2. Sit to supine with Contact Guard Assistance  3. Rolling to Left and Right with Contact Guard Assistance.  4. Sit to stand transfer with Contact Guard Assistance  5. Bed to chair transfer with Contact Guard Assistance using Rolling Walker  6. Gait  x 500 feet with Stand-by Assistance using Rolling Walker.                       Time Tracking:     PT Received On: 18  PT Start Time: 930     PT Stop Time: 955  PT Total Time (min): 25 min     Billable Minutes: Gait Training 15min and Therapeutic Activity 10min    Treatment Type: Treatment  PT/PTA: PTA     PTA Visit Number: 1     Gloria Whalen, PEÑA  2018

## 2018-09-07 NOTE — ASSESSMENT & PLAN NOTE
Patient with STEWART likely d/t Rhabdomyolysis  Which is currently improving. Labs reviewed- BMP with Estimated Creatinine Clearance: 42.5 mL/min (based on SCr of 1.1 mg/dL). according to latest data. Monitor UOP and serial BMP and adjust therapy as needed. Avoid nephrotoxins and renally dose meds for GFR listed above.

## 2018-09-07 NOTE — PROGRESS NOTES
[facility] Hospital Medicine  Progress Note    Patient Name: Toni Kamara  MRN: [id number]  Patient Class: IP- Inpatient   Admission Date: 9/4/2018  Length of Stay: 2 days  Attending Physician: Macario Walton MD  Primary Care Provider: Harinder Stout MD        Subjective:     Principal Problem:Rhabdomyolysis    HPI:  Toni Kamara is a 78 y.o. male with a hx of Parkinson's and BPH who presents to the ED with c/o left arm swelling and pain to the left hand. Pt states he fell while in the bed. His left arm was stuck in the bed rails for approximately 30-45 minutes. Pt denies left wrist pain, left forearm pain and BLE pain. NKDA noted.  Per the wife, who is the primary history provider, the patient has been more lethargic and moaning in pain. She also noted fever of 102.8, which was also noted in the ED. Patient denies cough, neck pain, or abdominal symptoms. Patient was noted in ED to have developed rhabdomyolysis.          Hospital Course:    78-year-old with history of Parkinson's who presents the hospital status post fall from bed and tourniquet injury of his left upper extremity.  Patient subsequently developed acute kidney injury and rhabdomyolysis and was started on IV fluids.  He also was somewhat lethargic on admission and his Sinemet was decreased secondary to decreased renal clearance.  Patient's mental status improved over the course of his hospitalization and his CPK trended down with IV fluids.  Patient had significant functional deficits particularly to his left upper extremity and PT/OT was consulted in order to assist patient functional decline.  Patient had 1 time fever in the emergency department but no further fevers in the hospital and he was not started on any antibiotics.    Interval History: Patient seen and examined.  No acute events overnight.  CPK improved. Renal function improved. Discussed with patient and family.    Review of Systems   Constitutional: Positive for  activity change, appetite change and fatigue. Negative for chills and fever.   Respiratory: Negative for cough and shortness of breath.    Cardiovascular: Negative for chest pain and leg swelling.   Gastrointestinal: Negative for abdominal pain, nausea and vomiting.   Musculoskeletal: Positive for arthralgias and myalgias. Negative for back pain.   Neurological: Positive for tremors and weakness.   Psychiatric/Behavioral: Negative for confusion. The patient is not nervous/anxious.    All other systems reviewed and are negative.    Objective:     Vital Signs (Most Recent):  Temp: 99.1 °F (37.3 °C) (09/06/18 2005)  Pulse: 70 (09/06/18 2005)  Resp: 20 (09/06/18 2005)  BP: (!) 125/95 (09/06/18 2005)  SpO2: 95 % (09/06/18 2005) Vital Signs (24h Range):  Temp:  [96.8 °F (36 °C)-99.1 °F (37.3 °C)] 99.1 °F (37.3 °C)  Pulse:  [60-77] 70  Resp:  [16-20] 20  SpO2:  [91 %-98 %] 95 %  BP: (119-143)/(63-95) 125/95     Weight: 53.4 kg (117 lb 11.6 oz)(75 kg 9/5)  Body mass index is 19 kg/m².    Intake/Output Summary (Last 24 hours) at 9/6/2018 2006  Last data filed at 9/6/2018 1837  Gross per 24 hour   Intake 2430 ml   Output --   Net 2430 ml      Physical Exam   Constitutional: He is oriented to person, place, and time.   Thin, frail elderly male   Eyes: EOM are normal. Pupils are equal, round, and reactive to light.   Neck: No JVD present.   Cardiovascular: Normal rate, regular rhythm, normal heart sounds and intact distal pulses.   Pulmonary/Chest: Effort normal and breath sounds normal.   Abdominal: Soft. Bowel sounds are normal. He exhibits no distension. There is no tenderness.   Musculoskeletal: He exhibits edema. He exhibits no deformity.   LUE with noted swelling and petechial rash, + TTP   Lymphadenopathy:     He has no cervical adenopathy.   Neurological: He is alert and oriented to person, place, and time.   Noted cogwheel tremor of R hand.   Skin: No rash noted. No pallor.   Nursing note and vitals  reviewed.      Significant Labs: All pertinent labs within the past 24 hours have been reviewed.    Significant Imaging: I have reviewed all pertinent imaging results/findings within the past 24 hours.    Assessment/Plan:      * Rhabdomyolysis    Likely d/t crush/tourniquet injury of KIRK when he fell out of the bed and caught his arm in the rail. Will continue IV hydration and monitoring or renal function closely in hospital. PT/OT. No need for surgical intervention at the moment. Continue to trend CPK- improving.          Fever    Acute, but source unknown.Etiology possibly infectious versus drug induced or inflammatory d/t tourniquet injury of arm. Monitor. Hold off ABX currently.          Encephalopathy, metabolic    Appears to be markedly improved and likely related to Sinemet.  Will continue to monitor patient's mental status closely.                Malnutrition of moderate degree    Nutrition consulted. Body mass index is 19.42 kg/m².. Encourage maximal PO intake. Diet supplementation ordered per nutrition approval. Will encourage PO and monitor closely for weight changes.            STEWART (acute kidney injury)    Patient with STEWART likely d/t Rhabdomyolysis  Which is currently improving. Labs reviewed- BMP with Estimated Creatinine Clearance: 42.5 mL/min (based on SCr of 1.1 mg/dL). according to latest data. Monitor UOP and serial BMP and adjust therapy as needed. Avoid nephrotoxins and renally dose meds for GFR listed above.              Essential hypertension    Chronic, controlled.  Monitor BP closely.  Will Hold ARB  medications and add as needed for sustained BP control.            Parkinson disease    Tremor worsening. Restart home dose of sinemet.             VTE Risk Mitigation (From admission, onward)        Ordered     enoxaparin injection 40 mg  Daily      09/05/18 1340     IP VTE HIGH RISK PATIENT  Once      09/04/18 2005              Macario Walton MD  Department of Hospital Medicine   [facility]

## 2018-09-07 NOTE — NURSING
DC instructions given. Pt and wife verbalized understanding. PIV removed. Catheter intact. VSS pt transferred off unit via WC by staff.

## 2018-09-07 NOTE — PROGRESS NOTES
SSC met with patient at bedside regarding home health.  Patient's family member signed patient preference form choosing [facility].  SSC sent patient information to [facility] and [facility] for home health services through [facility] for authorization and acceptance.TAIWO Guy    The referral for the patient in [facility]/PCU, room 224, bed 224 A admitted at 9/4/2018 12:47 PM has been updated by [email or web address].  Update: Accepted.

## 2018-09-07 NOTE — PLAN OF CARE
Problem: Physical Therapy Goal  Goal: Physical Therapy Goal  Goals to be met by: 18     Patient will increase functional independence with mobility by performin. Supine to sit with Contact Guard Assistance  2. Sit to supine with Contact Guard Assistance  3. Rolling to Left and Right with Contact Guard Assistance.  4. Sit to stand transfer with Contact Guard Assistance  5. Bed to chair transfer with Contact Guard Assistance using Rolling Walker  6. Gait  x 500 feet with Stand-by Assistance using Rolling Walker.      Outcome: Ongoing (interventions implemented as appropriate)  Ambulated in hallway with rw and Mod A for safety.

## 2018-09-07 NOTE — ASSESSMENT & PLAN NOTE
Likely d/t crush/tourniquet injury of KIRK when he fell out of the bed and caught his arm in the rail. Will continue IV hydration and monitoring or renal function closely in hospital. PT/OT. No need for surgical intervention at the moment. Continue to trend CPK- improving.

## 2018-09-08 NOTE — DISCHARGE SUMMARY
[facility] Hospital Medicine  Discharge Summary      Patient Name: Toni Kamara  MRN: [id number]  Admission Date: 9/4/2018  Hospital Length of Stay: 3 days  Discharge Date and Time: 9/7/2018  4:00 PM  Attending Physician: No att. providers found   Discharging Provider: Efarin Manuel MD  Primary Care Provider: Harinder Stout MD        HPI: Toni Kamara is a 78 y.o. male with a hx of Parkinson's and BPH who presents to the ED with c/o left arm swelling and pain to the left hand. Pt states he fell while in the bed. His left arm was stuck in the bed rails for approximately 30-45 minutes. Pt denies left wrist pain, left forearm pain and BLE pain. NKDA noted.  Per the wife, who is the primary history provider, the patient has been more lethargic and moaning in pain. She also noted fever of 102.8, which was also noted in the ED. Patient denies cough, neck pain, or abdominal symptoms. Patient was noted in ED to have developed rhabdomyolysis.          * No surgery found *      Hospital Course:   Pt was diagnosed with rhabdomyolysis from tourniquet-injury of the arm and STEWART.  He was put on crystalloid IV infusion.   The CPK slowly came downward.  Renal function improved as well.  He became more alert and started to speak and converse.  He had a one-time fever in ER but didn't have any further fevers while admitted.]  The fever was felt to be drug-induced or due to tourniquet injury of the arm.  No clear indication for antibiotic usage.  CPK came down to 888.  Pt was discharged in good condition.    PE:  Constitutional: He is oriented to person, place, and time.   Neck: No JVD present.   Cardiovascular: Normal rate, regular rhythm, normal heart sounds and intact distal pulses.   Pulmonary/Chest: Effort normal and breath sounds normal.   Abdominal: Soft. Bowel sounds are normal. He exhibits no distension. There is no tenderness.     Consults:   Consults (From admission, onward)        Status Ordering  Provider     IP consult to dietary  Once     Provider:  (Not yet assigned)    Completed HIEU AVILA          Final Active Diagnoses:    Diagnosis Date Noted POA    PRINCIPAL PROBLEM:  Rhabdomyolysis [M62.82] 09/04/2018 Yes    STEWART (acute kidney injury) [N17.9] 09/04/2018 Yes    Malnutrition of moderate degree [E44.0] 09/04/2018 Yes    Encephalopathy, metabolic [G93.41] 09/04/2018 Yes    Fever [R50.9] 09/04/2018 Yes    Essential hypertension [I10] 12/28/2017 Yes    Parkinson disease [G20] 04/13/2015 Yes    Essential tremor [G25.0] 03/19/2014 Yes      Problems Resolved During this Admission:      Discharged Condition: good    Disposition: Home-Health Care [facility]    Follow Up:  Follow-up Information     Harinder Stout MD In 2 weeks.    Specialty:  Family Medicine  Contact information:  [address] [facility] [address]  [phone]             [facility].    Specialty:  Home Health Services  Why:  Home Health  Contact information:  [id number] [facility] [id number] [phone]                 Patient Instructions:      Referral to Home health   Referral Priority: Routine Referral Type: Home Health Care   Referral Reason: Specialty Services Required   Requested Specialty: Home Health Services   Number of Visits Requested: 1     Diet Adult Regular     Activity as tolerated     Medications:  Reconciled Home Medications:      Medication List      CHANGE how you take these medications    clonazePAM 2 MG Tab  Commonly known as:  KLONOPIN  TAKE ONE TABLET BY MOUTH AT BEDTIME AS NEEDED  What changed:  See the new instructions.        CONTINUE taking these medications    carbidopa-levodopa  mg  mg per tablet  Commonly known as:  SINEMET  Take 2 tablets by mouth 3 (three) times daily.     losartan 50 MG tablet  Commonly known as:  COZAAR  Take 1 tablet (50 mg total) by mouth once daily.     oxyCODONE-acetaminophen 5-325 mg per tablet  Commonly known as:  PERCOCET  Take 1  tablet by mouth every 4 (four) hours as needed for Pain.            Significant Diagnostic Studies:   CPK   Date Value Ref Range Status   09/07/2018 888 (H) 20 - 200 U/L Final   09/06/2018 1879 (H) 20 - 200 U/L Final   09/05/2018 3390 (H) 20 - 200 U/L Final   09/04/2018 3738 (H) 20 - 200 U/L Final   05/08/2018 295 (H) 20 - 200 U/L Final     Creatinine   Date Value Ref Range Status   09/07/2018 1.0 0.5 - 1.4 mg/dL Final   09/06/2018 1.1 0.5 - 1.4 mg/dL Final   09/05/2018 1.2 0.5 - 1.4 mg/dL Final   09/04/2018 1.3 0.5 - 1.4 mg/dL Final     Lab Results   Component Value Date    WBC 7.20 09/07/2018    HGB 11.6 (L) 09/07/2018    HCT 34.5 (L) 09/07/2018    MCV 93 09/07/2018     (L) 09/07/2018     BMP  Lab Results   Component Value Date     09/07/2018    K 3.7 09/07/2018     09/07/2018    CO2 25 09/07/2018    BUN 21 09/07/2018    CREATININE 1.0 09/07/2018    CALCIUM 8.6 (L) 09/07/2018    ANIONGAP 7 (L) 09/07/2018    ESTGFRAFRICA >60 09/07/2018    EGFRNONAA >60 09/07/2018         Pending Diagnostic Studies:     None        Indwelling Lines/Drains at time of discharge:   Lines/Drains/Airways          None          Time spent on the discharge of patient: 28 minutes  Patient was seen and examined on the date of discharge and determined to be suitable for discharge.         Efrain Manuel MD  Department of Hospital Medicine  [facility]

## 2018-09-09 LAB
BACTERIA BLD CULT: NORMAL
BACTERIA BLD CULT: NORMAL

## 2018-09-10 ENCOUNTER — TELEPHONE (OUTPATIENT)
Dept: MEDSURG UNIT | Facility: HOSPITAL | Age: 78
End: 2018-09-10

## 2018-09-10 ENCOUNTER — PATIENT OUTREACH (OUTPATIENT)
Dept: ADMINISTRATIVE | Facility: CLINIC | Age: 78
End: 2018-09-10

## 2018-09-17 ENCOUNTER — OFFICE VISIT (OUTPATIENT)
Dept: FAMILY MEDICINE | Facility: CLINIC | Age: 78
End: 2018-09-17
Payer: MEDICARE

## 2018-09-17 ENCOUNTER — LAB VISIT (OUTPATIENT)
Dept: LAB | Facility: HOSPITAL | Age: 78
End: 2018-09-17
Attending: FAMILY MEDICINE
Payer: MEDICARE

## 2018-09-17 VITALS
DIASTOLIC BLOOD PRESSURE: 60 MMHG | HEIGHT: 66 IN | SYSTOLIC BLOOD PRESSURE: 100 MMHG | HEART RATE: 70 BPM | BODY MASS INDEX: 18.79 KG/M2 | WEIGHT: 116.88 LBS | TEMPERATURE: 98 F

## 2018-09-17 DIAGNOSIS — T79.6XXD TRAUMATIC RHABDOMYOLYSIS, SUBSEQUENT ENCOUNTER: ICD-10-CM

## 2018-09-17 DIAGNOSIS — R53.81 DEBILITY: ICD-10-CM

## 2018-09-17 DIAGNOSIS — I10 ESSENTIAL HYPERTENSION: ICD-10-CM

## 2018-09-17 DIAGNOSIS — F51.04 PSYCHOPHYSIOLOGICAL INSOMNIA: ICD-10-CM

## 2018-09-17 DIAGNOSIS — F41.9 ANXIETY: Primary | ICD-10-CM

## 2018-09-17 DIAGNOSIS — G20.A1 PARKINSON DISEASE: ICD-10-CM

## 2018-09-17 DIAGNOSIS — N17.9 AKI (ACUTE KIDNEY INJURY): ICD-10-CM

## 2018-09-17 DIAGNOSIS — R29.6 FREQUENT FALLS: ICD-10-CM

## 2018-09-17 LAB
ALBUMIN SERPL BCP-MCNC: 3.5 G/DL
ALP SERPL-CCNC: 66 U/L
ALT SERPL W/O P-5'-P-CCNC: 9 U/L
ANION GAP SERPL CALC-SCNC: 9 MMOL/L
AST SERPL-CCNC: 23 U/L
BASOPHILS # BLD AUTO: 0.05 K/UL
BASOPHILS NFR BLD: 0.7 %
BILIRUB SERPL-MCNC: 0.6 MG/DL
BUN SERPL-MCNC: 27 MG/DL
CALCIUM SERPL-MCNC: 9.9 MG/DL
CHLORIDE SERPL-SCNC: 104 MMOL/L
CK SERPL-CCNC: 122 U/L
CO2 SERPL-SCNC: 27 MMOL/L
CREAT SERPL-MCNC: 1.4 MG/DL
DIFFERENTIAL METHOD: ABNORMAL
EOSINOPHIL # BLD AUTO: 0 K/UL
EOSINOPHIL NFR BLD: 0.3 %
ERYTHROCYTE [DISTWIDTH] IN BLOOD BY AUTOMATED COUNT: 13 %
EST. GFR  (AFRICAN AMERICAN): 55.6 ML/MIN/1.73 M^2
EST. GFR  (NON AFRICAN AMERICAN): 48.1 ML/MIN/1.73 M^2
GLUCOSE SERPL-MCNC: 78 MG/DL
HCT VFR BLD AUTO: 38 %
HGB BLD-MCNC: 12.2 G/DL
IMM GRANULOCYTES # BLD AUTO: 0.03 K/UL
IMM GRANULOCYTES NFR BLD AUTO: 0.4 %
LYMPHOCYTES # BLD AUTO: 1.1 K/UL
LYMPHOCYTES NFR BLD: 16 %
MCH RBC QN AUTO: 31 PG
MCHC RBC AUTO-ENTMCNC: 32.1 G/DL
MCV RBC AUTO: 96 FL
MONOCYTES # BLD AUTO: 0.6 K/UL
MONOCYTES NFR BLD: 9.1 %
NEUTROPHILS # BLD AUTO: 5.1 K/UL
NEUTROPHILS NFR BLD: 73.5 %
NRBC BLD-RTO: 0 /100 WBC
PLATELET # BLD AUTO: 301 K/UL
PMV BLD AUTO: 10.5 FL
POTASSIUM SERPL-SCNC: 4.6 MMOL/L
PROT SERPL-MCNC: 7.1 G/DL
RBC # BLD AUTO: 3.94 M/UL
SODIUM SERPL-SCNC: 140 MMOL/L
WBC # BLD AUTO: 6.92 K/UL

## 2018-09-17 PROCEDURE — 82550 ASSAY OF CK (CPK): CPT

## 2018-09-17 PROCEDURE — 80053 COMPREHEN METABOLIC PANEL: CPT

## 2018-09-17 PROCEDURE — 99214 OFFICE O/P EST MOD 30 MIN: CPT | Mod: S$PBB,,, | Performed by: FAMILY MEDICINE

## 2018-09-17 PROCEDURE — 99999 PR PBB SHADOW E&M-EST. PATIENT-LVL III: CPT | Mod: PBBFAC,,, | Performed by: FAMILY MEDICINE

## 2018-09-17 PROCEDURE — 1101F PT FALLS ASSESS-DOCD LE1/YR: CPT | Mod: CPTII,,, | Performed by: FAMILY MEDICINE

## 2018-09-17 PROCEDURE — 99499 UNLISTED E&M SERVICE: CPT | Mod: S$GLB,,, | Performed by: FAMILY MEDICINE

## 2018-09-17 PROCEDURE — 36415 COLL VENOUS BLD VENIPUNCTURE: CPT | Mod: PN

## 2018-09-17 PROCEDURE — 3078F DIAST BP <80 MM HG: CPT | Mod: CPTII,,, | Performed by: FAMILY MEDICINE

## 2018-09-17 PROCEDURE — 85025 COMPLETE CBC W/AUTO DIFF WBC: CPT

## 2018-09-17 PROCEDURE — 99213 OFFICE O/P EST LOW 20 MIN: CPT | Mod: PBBFAC,PN | Performed by: FAMILY MEDICINE

## 2018-09-17 PROCEDURE — 3074F SYST BP LT 130 MM HG: CPT | Mod: CPTII,,, | Performed by: FAMILY MEDICINE

## 2018-09-17 RX ORDER — CLONAZEPAM 1 MG/1
1 TABLET ORAL NIGHTLY
Qty: 90 TABLET | Refills: 1 | Status: SHIPPED | OUTPATIENT
Start: 2018-09-17 | End: 2019-05-16 | Stop reason: SDUPTHER

## 2018-09-17 NOTE — PATIENT INSTRUCTIONS
Augmenting 875 twice a day for 10 days.   If it is 500 mg then use three times a day for 10 days.

## 2018-09-17 NOTE — PROGRESS NOTES
"Subjective:       Patient ID: Toni Kamara is a 78 y.o. male.    Chief Complaint: Hospital Follow Up (fallen)    He is here with his wife.  He had another recent hospitalization.  This time he had his left arm trapped in the bed and had rhabdomyolysis with a CPK above 3000.  He had had a previous left arm injury a few months prior that left him with some left hand weakness.  He has Parkinson's which has gotten worse.  He has had frequent falls.  He has lost weight.  He has been taking Klonopin 2 mg for sleep for quite some time.  He currently mentions that he sleeps 10 hr and still has some daytime sleepiness.  He sleeps in stages.  He also takes half of a Percocet at night to help with his back pain and leg pain that interfere with sleep.  He relates 1-2 weeks of urinary frequency and urgency.  He was treated for a urinary tract infection in April with Augmentin.  Those symptoms resolved.  He had a fever on the onset of his most recent hospitalization but the urinalysis was negative and he was not treated with any antibiotics.  He was noted to have mild anemia which was new for him.      Review of Systems   Constitutional: Positive for unexpected weight change. Negative for appetite change and fever.   Respiratory: Negative for cough and shortness of breath.    Cardiovascular: Negative for chest pain and palpitations.   Genitourinary: Positive for frequency and urgency. Negative for dysuria.   Neurological: Positive for tremors and weakness.       Objective:     Blood pressure 100/60, pulse 70, temperature 98.2 °F (36.8 °C), temperature source Oral, height 5' 6" (1.676 m), weight 53 kg (116 lb 13.5 oz).      Physical Exam   Constitutional:   He is thin frail and has a tremor.  It takes him a long time to use the bathroom and wash his hands.  Once he gets started in the walker he moves fairly briskly. he needs a little help to get out of a chair.   Cardiovascular: Normal rate and regular rhythm.   Murmur " heard.  Pulmonary/Chest: Effort normal and breath sounds normal.   Musculoskeletal: He exhibits no edema.   Left  is quite weak.  He is tender with swelling of his left 1st MCP.  His 2nd through 4th MCPs are also slightly swollen and tender   Neurological: He is alert.   Tremor and weak voice       Assessment:       1. Anxiety    2. Parkinson disease    3. Essential hypertension    4. Traumatic rhabdomyolysis, subsequent encounter    5. Debility    6. STEWART (acute kidney injury)    7. Psychophysiological insomnia    8. Frequent falls        Plan:       Follow-up lab work today.  Empiric treatment with Augmentin for 10 days.  He just urinated so we were unable to get a urine sample.  Refilled Klonopin at a lower dose of 1 mg at night.

## 2018-09-18 ENCOUNTER — TELEPHONE (OUTPATIENT)
Dept: FAMILY MEDICINE | Facility: CLINIC | Age: 78
End: 2018-09-18

## 2018-09-18 RX ORDER — AMOXICILLIN AND CLAVULANATE POTASSIUM 875; 125 MG/1; MG/1
1 TABLET, FILM COATED ORAL 2 TIMES DAILY
Qty: 20 TABLET | Refills: 0 | Status: SHIPPED | OUTPATIENT
Start: 2018-09-18 | End: 2019-01-31

## 2018-09-18 NOTE — TELEPHONE ENCOUNTER
She told me she still had some at home.  Apparently she was mistaken.I will send augmentin 875 bid 10 days to [facility].

## 2018-09-18 NOTE — TELEPHONE ENCOUNTER
----- Message from Cecile Tobin sent at 9/18/2018 10:11 AM CDT -----  Contact: wife Ree   Patient wife need to speak with nurse regarding medication augmentin 800 mg ,patient wife states DR. Stout was prescribing this medication to patient ..      [id number]  [place] - [address] [place], [place] [address] [address]  Phone: [phone] Fax: [phone]    Please call patient wife to discuss more details if needed [phone]

## 2018-09-19 ENCOUNTER — TELEPHONE (OUTPATIENT)
Dept: FAMILY MEDICINE | Facility: CLINIC | Age: 78
End: 2018-09-19

## 2018-09-19 NOTE — TELEPHONE ENCOUNTER
I spoke with Yudith.  I agree that he has been having frequency or urgency and incomplete emptying.  IA told him to start Augmentin on Monday but apparently they did not have any so I sent a prescription for Augmentin yesterday.  Yudith will confirm with the patient that he has the antibiotic and is taking it.  If his symptoms do not improve we will do urinalysis and culture off of the antibiotic.

## 2018-09-19 NOTE — TELEPHONE ENCOUNTER
Spoke with Yudith with HH. She reports pt is starting to have increased urinary frequency. Denies pain or burning. HH nurse feels that hes on the verge of a UTI. Pt has significant Hx of UTI as reported by HH. Would you like to go ahead and run a UA ? Please advise allergies reviewed. Order can be faxed to [facility]. Or called to Yudith.-ASHELY

## 2018-09-19 NOTE — TELEPHONE ENCOUNTER
----- Message from Shaun Macias sent at 9/19/2018  1:12 PM CDT -----  Contact: Dev  Type: Needs Medical Advice    Who Called:  Dev with [facility]  Symptoms (please be specific):    How long has patient had these symptoms:    Pharmacy name and phone #:    Best Call Back Number: [phone]  Additional Information: called to advise that patient is complaining about urinary frequencies.

## 2018-09-25 ENCOUNTER — TELEPHONE (OUTPATIENT)
Dept: FAMILY MEDICINE | Facility: CLINIC | Age: 78
End: 2018-09-25

## 2018-09-25 DIAGNOSIS — D50.9 IRON DEFICIENCY ANEMIA, UNSPECIFIED IRON DEFICIENCY ANEMIA TYPE: Primary | ICD-10-CM

## 2018-09-25 NOTE — TELEPHONE ENCOUNTER
Spoke with pt's wife, she received his lab results via mail and saw that his H/H is low and wants additonal lab work done.      She is requested specifically a  Iron Panel  (iron serum / TIBC / %saturation / ferritin    Please advise   Pt is not currently taking any iron supplements

## 2018-09-25 NOTE — TELEPHONE ENCOUNTER
----- Message from Migdalia Gallegos sent at 9/25/2018  9:59 AM CDT -----  Contact: PTs Wife  Wife is calling regarding requesting more blood work orders.     Iron Panel  (iron serum / TIBC / %saturation / ferritin)    Callback: [phone]

## 2018-09-25 NOTE — TELEPHONE ENCOUNTER
----- Message from Tresa Wagoner sent at 9/25/2018 10:30 AM CDT -----  Contact: wife- Ms Anh Colindres's call regarding lab orders for iron panel.  Please call back at

## 2018-10-02 ENCOUNTER — TELEPHONE (OUTPATIENT)
Dept: FAMILY MEDICINE | Facility: CLINIC | Age: 78
End: 2018-10-02

## 2018-10-02 DIAGNOSIS — R35.0 FREQUENT URINATION: Primary | ICD-10-CM

## 2018-10-02 NOTE — TELEPHONE ENCOUNTER
----- Message from Alka Goldsmith sent at 10/2/2018  9:00 AM CDT -----  Pt finished antibiotics on Friday  ... Having frequent urination ... Asking to have orders for home health nurse to check urine  / call Radha [phone]

## 2018-10-02 NOTE — TELEPHONE ENCOUNTER
Spoke with pt wife, she asked that I contact home health to request that they come by pt house and collect urine sample per orders.     Contacted [facility] at [phone] left message for Nurse Jones to collect urine sample from pt's home.

## 2018-10-02 NOTE — TELEPHONE ENCOUNTER
States that patient finished his course of abx on Friday. States that Dr Stout advised that if he was still having symptoms then he will need to do repeat UA. States patient is having frequency and urgency. Please advise if a culture would be needed as well. States that home health can come collect with [facility].

## 2018-10-15 ENCOUNTER — TELEPHONE (OUTPATIENT)
Dept: ADMINISTRATIVE | Facility: CLINIC | Age: 78
End: 2018-10-15

## 2018-10-15 NOTE — TELEPHONE ENCOUNTER
Home Health SOC 09/08/2018 - 11/06/2018 with [facility] ([place]) - Dr. Harinder Stout. Patient received SN, PT and OT services.

## 2019-01-02 RX ORDER — LOSARTAN POTASSIUM 50 MG/1
50 TABLET ORAL DAILY
Qty: 90 TABLET | Refills: 1 | Status: SHIPPED | OUTPATIENT
Start: 2019-01-02 | End: 2020-01-06

## 2019-01-02 NOTE — TELEPHONE ENCOUNTER
----- Message from Dacia Oconnell sent at 1/2/2019 11:16 AM CST -----  Contact: patient spouse chaka at [phone]  Patient requesting a refill on losartan 50 mg, only have 2 pills left.    Patient will be using   [id number]  [place] - [address] [facility].  [address] [place] [address]  [place] [id number]  Phone: [phone] Fax: [phone]    Please call patient spouse chaka at [phone].     Thanks!

## 2019-01-31 ENCOUNTER — OFFICE VISIT (OUTPATIENT)
Dept: FAMILY MEDICINE | Facility: CLINIC | Age: 79
End: 2019-01-31
Payer: MEDICARE

## 2019-01-31 ENCOUNTER — LAB VISIT (OUTPATIENT)
Dept: LAB | Facility: HOSPITAL | Age: 79
End: 2019-01-31
Attending: FAMILY MEDICINE
Payer: MEDICARE

## 2019-01-31 VITALS
RESPIRATION RATE: 17 BRPM | HEIGHT: 66 IN | DIASTOLIC BLOOD PRESSURE: 80 MMHG | WEIGHT: 130.31 LBS | BODY MASS INDEX: 20.94 KG/M2 | TEMPERATURE: 98 F | SYSTOLIC BLOOD PRESSURE: 150 MMHG | OXYGEN SATURATION: 96 % | HEART RATE: 66 BPM

## 2019-01-31 DIAGNOSIS — R29.6 FREQUENT FALLS: ICD-10-CM

## 2019-01-31 DIAGNOSIS — G73.7 MYOPATHY IN DISEASES CLASSIFIED ELSEWHERE: ICD-10-CM

## 2019-01-31 DIAGNOSIS — R79.9 ABNORMAL FINDING OF BLOOD CHEMISTRY: ICD-10-CM

## 2019-01-31 DIAGNOSIS — G20.A1 PARKINSON DISEASE: Primary | ICD-10-CM

## 2019-01-31 DIAGNOSIS — F41.9 ANXIETY DISORDER: ICD-10-CM

## 2019-01-31 DIAGNOSIS — R73.03 PREDIABETES: ICD-10-CM

## 2019-01-31 DIAGNOSIS — I10 ESSENTIAL HYPERTENSION: ICD-10-CM

## 2019-01-31 DIAGNOSIS — G20.A1 PARKINSON DISEASE: ICD-10-CM

## 2019-01-31 DIAGNOSIS — F41.1 GENERALIZED ANXIETY DISORDER: ICD-10-CM

## 2019-01-31 PROBLEM — N17.9 AKI (ACUTE KIDNEY INJURY): Status: RESOLVED | Noted: 2018-09-04 | Resolved: 2019-01-31

## 2019-01-31 PROBLEM — M62.82 RHABDOMYOLYSIS: Status: RESOLVED | Noted: 2018-09-04 | Resolved: 2019-01-31

## 2019-01-31 PROBLEM — E86.0 DEHYDRATION: Status: RESOLVED | Noted: 2018-07-15 | Resolved: 2019-01-31

## 2019-01-31 LAB
25(OH)D3+25(OH)D2 SERPL-MCNC: 39 NG/ML
ALBUMIN SERPL BCP-MCNC: 4.1 G/DL
ALP SERPL-CCNC: 62 U/L
ALT SERPL W/O P-5'-P-CCNC: <5 U/L
ANION GAP SERPL CALC-SCNC: 8 MMOL/L
AST SERPL-CCNC: 28 U/L
BILIRUB SERPL-MCNC: 0.9 MG/DL
BUN SERPL-MCNC: 21 MG/DL
CALCIUM SERPL-MCNC: 10.1 MG/DL
CHLORIDE SERPL-SCNC: 105 MMOL/L
CO2 SERPL-SCNC: 27 MMOL/L
CREAT SERPL-MCNC: 1.1 MG/DL
ERYTHROCYTE [DISTWIDTH] IN BLOOD BY AUTOMATED COUNT: 12.8 %
EST. GFR  (AFRICAN AMERICAN): >60 ML/MIN/1.73 M^2
EST. GFR  (NON AFRICAN AMERICAN): >60 ML/MIN/1.73 M^2
ESTIMATED AVG GLUCOSE: 108 MG/DL
GLUCOSE SERPL-MCNC: 94 MG/DL
HBA1C MFR BLD HPLC: 5.4 %
HCT VFR BLD AUTO: 43.8 %
HGB BLD-MCNC: 14.5 G/DL
MCH RBC QN AUTO: 31.9 PG
MCHC RBC AUTO-ENTMCNC: 33.1 G/DL
MCV RBC AUTO: 97 FL
PLATELET # BLD AUTO: 165 K/UL
PMV BLD AUTO: 10.7 FL
POTASSIUM SERPL-SCNC: 4.6 MMOL/L
PROT SERPL-MCNC: 7.4 G/DL
RBC # BLD AUTO: 4.54 M/UL
SODIUM SERPL-SCNC: 140 MMOL/L
T3FREE SERPL-MCNC: 2.3 PG/ML
T4 FREE SERPL-MCNC: 0.93 NG/DL
TSH SERPL DL<=0.005 MIU/L-ACNC: 0.46 UIU/ML
WBC # BLD AUTO: 5.44 K/UL

## 2019-01-31 PROCEDURE — 99214 PR OFFICE/OUTPT VISIT, EST, LEVL IV, 30-39 MIN: ICD-10-PCS | Mod: S$GLB,,, | Performed by: FAMILY MEDICINE

## 2019-01-31 PROCEDURE — 99999 PR PBB SHADOW E&M-EST. PATIENT-LVL III: ICD-10-PCS | Mod: PBBFAC,,, | Performed by: FAMILY MEDICINE

## 2019-01-31 PROCEDURE — 1101F PT FALLS ASSESS-DOCD LE1/YR: CPT | Mod: CPTII,S$GLB,, | Performed by: FAMILY MEDICINE

## 2019-01-31 PROCEDURE — 3077F PR MOST RECENT SYSTOLIC BLOOD PRESSURE >= 140 MM HG: ICD-10-PCS | Mod: CPTII,S$GLB,, | Performed by: FAMILY MEDICINE

## 2019-01-31 PROCEDURE — 99499 UNLISTED E&M SERVICE: CPT | Mod: S$GLB,,, | Performed by: FAMILY MEDICINE

## 2019-01-31 PROCEDURE — 99214 OFFICE O/P EST MOD 30 MIN: CPT | Mod: S$GLB,,, | Performed by: FAMILY MEDICINE

## 2019-01-31 PROCEDURE — 82306 VITAMIN D 25 HYDROXY: CPT

## 2019-01-31 PROCEDURE — 84439 ASSAY OF FREE THYROXINE: CPT

## 2019-01-31 PROCEDURE — 83036 HEMOGLOBIN GLYCOSYLATED A1C: CPT

## 2019-01-31 PROCEDURE — 99499 RISK ADDL DX/OHS AUDIT: ICD-10-PCS | Mod: S$GLB,,, | Performed by: FAMILY MEDICINE

## 2019-01-31 PROCEDURE — 36415 COLL VENOUS BLD VENIPUNCTURE: CPT | Mod: PN

## 2019-01-31 PROCEDURE — 3077F SYST BP >= 140 MM HG: CPT | Mod: CPTII,S$GLB,, | Performed by: FAMILY MEDICINE

## 2019-01-31 PROCEDURE — 85027 COMPLETE CBC AUTOMATED: CPT

## 2019-01-31 PROCEDURE — 3079F DIAST BP 80-89 MM HG: CPT | Mod: CPTII,S$GLB,, | Performed by: FAMILY MEDICINE

## 2019-01-31 PROCEDURE — 1101F PR PT FALLS ASSESS DOC 0-1 FALLS W/OUT INJ PAST YR: ICD-10-PCS | Mod: CPTII,S$GLB,, | Performed by: FAMILY MEDICINE

## 2019-01-31 PROCEDURE — 84443 ASSAY THYROID STIM HORMONE: CPT

## 2019-01-31 PROCEDURE — 84481 FREE ASSAY (FT-3): CPT

## 2019-01-31 PROCEDURE — 80053 COMPREHEN METABOLIC PANEL: CPT

## 2019-01-31 PROCEDURE — 3079F PR MOST RECENT DIASTOLIC BLOOD PRESSURE 80-89 MM HG: ICD-10-PCS | Mod: CPTII,S$GLB,, | Performed by: FAMILY MEDICINE

## 2019-01-31 PROCEDURE — 83090 ASSAY OF HOMOCYSTEINE: CPT

## 2019-01-31 PROCEDURE — 99999 PR PBB SHADOW E&M-EST. PATIENT-LVL III: CPT | Mod: PBBFAC,,, | Performed by: FAMILY MEDICINE

## 2019-01-31 NOTE — PROGRESS NOTES
Subjective:       Patient ID: Toni Kamara is a 78 y.o. male.    Chief Complaint: Follow-up (6 week follow up )    He is here with his wife.  Follow-up.  Last year he had a significant episode of rhabdomyolysis after he got trapped in his bed.  That caused a setback with weakness and some drowsiness.  He has gotten stronger.  He has been doing his physical therapy exercises with his legs and arms.  They have an aide who comes in in the evening to help him bathe and get undressed and get to bed.  That allows his wife to get to bed since the patient does not go to bed until 1:00 a.m..  At the last visit I reduced his Klonopin from 2 mg to 1 mg because he was having daytime sleepiness and sleeping more than 10 hr a night.  The wife and the daughter have been working on reducing his dose because they felt that he got very agitated going from 2 mg to 1 mg.  He is currently taking 1.25 mg of Klonopin at night.  He has been supplementing with 25 mg of Benadryl with no significant side effects.  He has Parkinson's and takes 2 Sinemet at noon, 3 or 4:00 p.m., 7:00 p.m..  His neurologist is Dr. Youssef.  His daughter would like him to have a panel of blood tests.  She has requested some of these tests in the past.  He has no history of thyroid disease and most of his thyroid tests tend to be normal.      Review of Systems   Constitutional: Negative for fever and unexpected weight change.   Respiratory: Negative for cough and shortness of breath.    Cardiovascular: Negative for chest pain, palpitations and leg swelling.        He notices that his left foot is a bit redder than his right.  It also is a little bit colder.  We did a venous ultrasound last year which was unremarkable.   Neurological:        Tremor.  Some weakness getting up from a low chair.  He is very slow to dress and bathe.  He benefits from an aide.       Objective:     Blood pressure (!) 150/80, pulse 66, temperature 97.8 °F (36.6 °C), temperature source  "Oral, resp. rate 17, height 5' 6" (1.676 m), weight 59.1 kg (130 lb 4.7 oz), SpO2 96 %.      Physical Exam   Constitutional:   Thin male with a tremor.  He is alert.   Cardiovascular: Normal rate and regular rhythm.   It is hard to feel his ankle pulses.  His capillary refill is good.  He does not have claudication.   Pulmonary/Chest: Effort normal and breath sounds normal. No respiratory distress.   Musculoskeletal: He exhibits no edema.   He walks bent forward some.  He has a shuffling gait min moves along fairly well.  He has of very soft voice and lack of facial expression.  Significant bilateral hand tremor.       Assessment:       1. Parkinson disease    2. Frequent falls    3. Essential hypertension    4. 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 Prediabetes     7. Anxiety disorder         Plan:       Lab work drawn today.  I am okay with the adjustment of Klonopin to the point where he is able to sleep.  They are aiming for 1 mg or 1.25 mg at night.    he had a flu shot in October.  "

## 2019-02-01 LAB — HCYS SERPL-SCNC: 11.8 UMOL/L

## 2019-05-02 ENCOUNTER — TELEPHONE (OUTPATIENT)
Dept: FAMILY MEDICINE | Facility: CLINIC | Age: 79
End: 2019-05-02

## 2019-05-02 DIAGNOSIS — Z12.5 SCREENING FOR PROSTATE CANCER: ICD-10-CM

## 2019-05-02 DIAGNOSIS — I10 ESSENTIAL HYPERTENSION: Primary | ICD-10-CM

## 2019-05-02 NOTE — TELEPHONE ENCOUNTER
Pt is requesting PSA and Calcium levels. Please review for ALL/ANY labs pt may be due for. I spoke w/ him in the lobby and reiterated several times(his caregiver confirmed) that we would find out which labs he is due for and call him top schedule them here and to see Dr Stout a few days later.     Call back number [phone]

## 2019-05-02 NOTE — TELEPHONE ENCOUNTER
----- Message from Myah Foster sent at 5/2/2019  3:02 PM CDT -----  Patient stated that someone is suppose to call him back to make an appointment once they talk to Dr. Stout, he stated that Monday May 6 will be better, if not it will have to be the week of May 13.

## 2019-05-02 NOTE — TELEPHONE ENCOUNTER
Spoke w/ pt. Appt w/ Dr Stout sched for 1030am on 5/9/19. Lab sched for 5/6/19. Pt is aware to fast and can come anytime after 730am.

## 2019-05-06 ENCOUNTER — LAB VISIT (OUTPATIENT)
Dept: LAB | Facility: HOSPITAL | Age: 79
End: 2019-05-06
Attending: FAMILY MEDICINE
Payer: MEDICARE

## 2019-05-06 DIAGNOSIS — I10 ESSENTIAL HYPERTENSION: ICD-10-CM

## 2019-05-06 DIAGNOSIS — Z12.5 SCREENING FOR PROSTATE CANCER: ICD-10-CM

## 2019-05-06 LAB
ALBUMIN SERPL BCP-MCNC: 4.2 G/DL (ref 3.5–5.2)
ALP SERPL-CCNC: 56 U/L (ref 55–135)
ALT SERPL W/O P-5'-P-CCNC: 5 U/L (ref 10–44)
ANION GAP SERPL CALC-SCNC: 8 MMOL/L (ref 8–16)
AST SERPL-CCNC: 20 U/L (ref 10–40)
BILIRUB SERPL-MCNC: 1.3 MG/DL (ref 0.1–1)
BUN SERPL-MCNC: 29 MG/DL (ref 8–23)
CALCIUM SERPL-MCNC: 10 MG/DL (ref 8.7–10.5)
CHLORIDE SERPL-SCNC: 106 MMOL/L (ref 95–110)
CHOLEST SERPL-MCNC: 183 MG/DL (ref 120–199)
CHOLEST/HDLC SERPL: 2.5 {RATIO} (ref 2–5)
CO2 SERPL-SCNC: 25 MMOL/L (ref 23–29)
COMPLEXED PSA SERPL-MCNC: 1.4 NG/ML (ref 0–4)
CREAT SERPL-MCNC: 1.2 MG/DL (ref 0.5–1.4)
EST. GFR  (AFRICAN AMERICAN): >60 ML/MIN/1.73 M^2
EST. GFR  (NON AFRICAN AMERICAN): 57.6 ML/MIN/1.73 M^2
GLUCOSE SERPL-MCNC: 97 MG/DL (ref 70–110)
HDLC SERPL-MCNC: 72 MG/DL (ref 40–75)
HDLC SERPL: 39.3 % (ref 20–50)
LDLC SERPL CALC-MCNC: 93.8 MG/DL (ref 63–159)
MAGNESIUM SERPL-MCNC: 2 MG/DL (ref 1.6–2.6)
NONHDLC SERPL-MCNC: 111 MG/DL
POTASSIUM SERPL-SCNC: 3.9 MMOL/L (ref 3.5–5.1)
PROT SERPL-MCNC: 7.1 G/DL (ref 6–8.4)
SODIUM SERPL-SCNC: 139 MMOL/L (ref 136–145)
TRIGL SERPL-MCNC: 86 MG/DL (ref 30–150)

## 2019-05-06 PROCEDURE — 83735 ASSAY OF MAGNESIUM: CPT

## 2019-05-06 PROCEDURE — 80053 COMPREHEN METABOLIC PANEL: CPT

## 2019-05-06 PROCEDURE — 80061 LIPID PANEL: CPT

## 2019-05-06 PROCEDURE — 36415 COLL VENOUS BLD VENIPUNCTURE: CPT | Mod: PN

## 2019-05-06 PROCEDURE — 84153 ASSAY OF PSA TOTAL: CPT

## 2019-05-16 ENCOUNTER — TELEPHONE (OUTPATIENT)
Dept: FAMILY MEDICINE | Facility: CLINIC | Age: 79
End: 2019-05-16

## 2019-05-16 ENCOUNTER — OFFICE VISIT (OUTPATIENT)
Dept: FAMILY MEDICINE | Facility: CLINIC | Age: 79
End: 2019-05-16
Payer: MEDICARE

## 2019-05-16 VITALS
DIASTOLIC BLOOD PRESSURE: 84 MMHG | SYSTOLIC BLOOD PRESSURE: 136 MMHG | HEART RATE: 62 BPM | WEIGHT: 123 LBS | BODY MASS INDEX: 19.86 KG/M2

## 2019-05-16 DIAGNOSIS — R53.81 DEBILITY: ICD-10-CM

## 2019-05-16 DIAGNOSIS — G20.A1 PARKINSON DISEASE: Primary | ICD-10-CM

## 2019-05-16 DIAGNOSIS — I10 ESSENTIAL HYPERTENSION: ICD-10-CM

## 2019-05-16 DIAGNOSIS — F41.9 ANXIETY: ICD-10-CM

## 2019-05-16 DIAGNOSIS — F51.04 PSYCHOPHYSIOLOGICAL INSOMNIA: ICD-10-CM

## 2019-05-16 DIAGNOSIS — M54.9 BACK PAIN, UNSPECIFIED BACK LOCATION, UNSPECIFIED BACK PAIN LATERALITY, UNSPECIFIED CHRONICITY: ICD-10-CM

## 2019-05-16 PROCEDURE — 3079F DIAST BP 80-89 MM HG: CPT | Mod: CPTII,S$GLB,, | Performed by: FAMILY MEDICINE

## 2019-05-16 PROCEDURE — 1101F PR PT FALLS ASSESS DOC 0-1 FALLS W/OUT INJ PAST YR: ICD-10-PCS | Mod: CPTII,S$GLB,, | Performed by: FAMILY MEDICINE

## 2019-05-16 PROCEDURE — 99999 PR PBB SHADOW E&M-EST. PATIENT-LVL III: ICD-10-PCS | Mod: PBBFAC,,, | Performed by: FAMILY MEDICINE

## 2019-05-16 PROCEDURE — 99999 PR PBB SHADOW E&M-EST. PATIENT-LVL III: CPT | Mod: PBBFAC,,, | Performed by: FAMILY MEDICINE

## 2019-05-16 PROCEDURE — 99499 UNLISTED E&M SERVICE: CPT | Mod: S$GLB,,, | Performed by: FAMILY MEDICINE

## 2019-05-16 PROCEDURE — 1101F PT FALLS ASSESS-DOCD LE1/YR: CPT | Mod: CPTII,S$GLB,, | Performed by: FAMILY MEDICINE

## 2019-05-16 PROCEDURE — 99499 RISK ADDL DX/OHS AUDIT: ICD-10-PCS | Mod: S$GLB,,, | Performed by: FAMILY MEDICINE

## 2019-05-16 PROCEDURE — 99214 PR OFFICE/OUTPT VISIT, EST, LEVL IV, 30-39 MIN: ICD-10-PCS | Mod: S$GLB,,, | Performed by: FAMILY MEDICINE

## 2019-05-16 PROCEDURE — 3079F PR MOST RECENT DIASTOLIC BLOOD PRESSURE 80-89 MM HG: ICD-10-PCS | Mod: CPTII,S$GLB,, | Performed by: FAMILY MEDICINE

## 2019-05-16 PROCEDURE — 3075F PR MOST RECENT SYSTOLIC BLOOD PRESS GE 130-139MM HG: ICD-10-PCS | Mod: CPTII,S$GLB,, | Performed by: FAMILY MEDICINE

## 2019-05-16 PROCEDURE — 3075F SYST BP GE 130 - 139MM HG: CPT | Mod: CPTII,S$GLB,, | Performed by: FAMILY MEDICINE

## 2019-05-16 PROCEDURE — 99214 OFFICE O/P EST MOD 30 MIN: CPT | Mod: S$GLB,,, | Performed by: FAMILY MEDICINE

## 2019-05-16 RX ORDER — OXYCODONE AND ACETAMINOPHEN 5; 325 MG/1; MG/1
1 TABLET ORAL
COMMUNITY
End: 2019-08-27

## 2019-05-16 RX ORDER — CLONAZEPAM 1 MG/1
1 TABLET ORAL NIGHTLY
Qty: 90 TABLET | Refills: 1 | Status: SHIPPED | OUTPATIENT
Start: 2019-05-16 | End: 2019-10-14

## 2019-05-16 NOTE — PROGRESS NOTES
Subjective:       Patient ID: Toni Kamara is a 79 y.o. male.    Chief Complaint: Follow-up (Lab f/u. jcl)    He came in today without an appointment but he previously had book the appointment it but it got canceled.  Follow-up lab work.  Follow-up hypertension.  He takes losartan 50 mg.  His Parkinson's is stable.  He has not fallen recently.  They have been able to taper his Klonopin down to 1 mg in the evening.  He takes up Percocet 1/2 or 1 tablet daily.  Usually at night.  It helps him get comfortable to sleep.  He drove himself here today.    Review of Systems   Constitutional: Positive for unexpected weight change. Negative for fatigue and fever.   Respiratory: Negative for cough and shortness of breath.    Cardiovascular: Negative for chest pain, palpitations and leg swelling.   Gastrointestinal: Positive for constipation. Negative for abdominal pain.   Neurological: Positive for tremors and weakness.       Objective:     Blood pressure 136/84, pulse 62, weight 55.8 kg (123 lb 0.3 oz).      Physical Exam   Constitutional:   Thin alert with a tremor.  He ambulates reasonably well.   Cardiovascular: Normal rate and regular rhythm.   Pulmonary/Chest: Effort normal and breath sounds normal.   Kyphosis   Abdominal: Soft. Bowel sounds are normal. He exhibits no distension. There is no tenderness.   Musculoskeletal: He exhibits no edema.   Neurological: He is alert.   Course tremor.  He was able to sign a document.       Assessment:       1. Parkinson disease    2. Anxiety    3. Back pain, unspecified back location, unspecified back pain laterality, unspecified chronicity    4. Essential hypertension    5. Debility    6. Psychophysiological insomnia        Plan:       I refilled Klonopin.  I reviewed his lab work with him.  He has had a elevated bilirubin often on in the past.  I am a bit concerned about his weight loss but we will monitor that.

## 2019-08-15 ENCOUNTER — OFFICE VISIT (OUTPATIENT)
Dept: FAMILY MEDICINE | Facility: CLINIC | Age: 79
End: 2019-08-15
Payer: MEDICARE

## 2019-08-15 ENCOUNTER — TELEPHONE (OUTPATIENT)
Dept: FAMILY MEDICINE | Facility: CLINIC | Age: 79
End: 2019-08-15

## 2019-08-15 ENCOUNTER — LAB VISIT (OUTPATIENT)
Dept: LAB | Facility: HOSPITAL | Age: 79
End: 2019-08-15
Payer: MEDICARE

## 2019-08-15 VITALS
TEMPERATURE: 99 F | SYSTOLIC BLOOD PRESSURE: 92 MMHG | HEART RATE: 76 BPM | BODY MASS INDEX: 17.36 KG/M2 | OXYGEN SATURATION: 95 % | HEIGHT: 70 IN | DIASTOLIC BLOOD PRESSURE: 60 MMHG

## 2019-08-15 DIAGNOSIS — R39.9 UTI SYMPTOMS: Primary | ICD-10-CM

## 2019-08-15 DIAGNOSIS — G20.A1 PARKINSON DISEASE: ICD-10-CM

## 2019-08-15 DIAGNOSIS — S42.242B: Primary | ICD-10-CM

## 2019-08-15 DIAGNOSIS — R29.6 FREQUENT FALLS: ICD-10-CM

## 2019-08-15 DIAGNOSIS — N17.9 AKI (ACUTE KIDNEY INJURY): ICD-10-CM

## 2019-08-15 LAB
ANION GAP SERPL CALC-SCNC: 8 MMOL/L (ref 8–16)
BILIRUB SERPL-MCNC: NORMAL MG/DL
BLOOD URINE, POC: NORMAL
BUN SERPL-MCNC: 35 MG/DL (ref 8–23)
CALCIUM SERPL-MCNC: 9.1 MG/DL (ref 8.7–10.5)
CHLORIDE SERPL-SCNC: 105 MMOL/L (ref 95–110)
CO2 SERPL-SCNC: 25 MMOL/L (ref 23–29)
COLOR, POC UA: NORMAL
CREAT SERPL-MCNC: 1.3 MG/DL (ref 0.5–1.4)
EST. GFR  (AFRICAN AMERICAN): >60 ML/MIN/1.73 M^2
EST. GFR  (NON AFRICAN AMERICAN): 52.3 ML/MIN/1.73 M^2
GLUCOSE SERPL-MCNC: 155 MG/DL (ref 70–110)
GLUCOSE UR QL STRIP: NORMAL
KETONES UR QL STRIP: NORMAL
LEUKOCYTE ESTERASE URINE, POC: NORMAL
NITRITE, POC UA: NORMAL
PH, POC UA: 5
POTASSIUM SERPL-SCNC: 4.3 MMOL/L (ref 3.5–5.1)
PROTEIN, POC: NORMAL
SODIUM SERPL-SCNC: 138 MMOL/L (ref 136–145)
SPECIFIC GRAVITY, POC UA: 1.02
UROBILINOGEN, POC UA: NORMAL

## 2019-08-15 PROCEDURE — 99499 UNLISTED E&M SERVICE: CPT | Mod: S$GLB,,, | Performed by: NURSE PRACTITIONER

## 2019-08-15 PROCEDURE — 81002 POCT URINE DIPSTICK WITHOUT MICROSCOPE: ICD-10-PCS | Mod: S$GLB,,, | Performed by: NURSE PRACTITIONER

## 2019-08-15 PROCEDURE — 99213 PR OFFICE/OUTPT VISIT, EST, LEVL III, 20-29 MIN: ICD-10-PCS | Mod: 25,S$GLB,, | Performed by: NURSE PRACTITIONER

## 2019-08-15 PROCEDURE — 81002 URINALYSIS NONAUTO W/O SCOPE: CPT | Mod: S$GLB,,, | Performed by: NURSE PRACTITIONER

## 2019-08-15 PROCEDURE — 3074F SYST BP LT 130 MM HG: CPT | Mod: CPTII,S$GLB,, | Performed by: NURSE PRACTITIONER

## 2019-08-15 PROCEDURE — 99213 OFFICE O/P EST LOW 20 MIN: CPT | Mod: 25,S$GLB,, | Performed by: NURSE PRACTITIONER

## 2019-08-15 PROCEDURE — 3078F DIAST BP <80 MM HG: CPT | Mod: CPTII,S$GLB,, | Performed by: NURSE PRACTITIONER

## 2019-08-15 PROCEDURE — 36415 COLL VENOUS BLD VENIPUNCTURE: CPT | Mod: PN

## 2019-08-15 PROCEDURE — 1101F PT FALLS ASSESS-DOCD LE1/YR: CPT | Mod: CPTII,S$GLB,, | Performed by: NURSE PRACTITIONER

## 2019-08-15 PROCEDURE — 3078F PR MOST RECENT DIASTOLIC BLOOD PRESSURE < 80 MM HG: ICD-10-PCS | Mod: CPTII,S$GLB,, | Performed by: NURSE PRACTITIONER

## 2019-08-15 PROCEDURE — 99999 PR PBB SHADOW E&M-EST. PATIENT-LVL III: CPT | Mod: PBBFAC,,, | Performed by: NURSE PRACTITIONER

## 2019-08-15 PROCEDURE — 99499 RISK ADDL DX/OHS AUDIT: ICD-10-PCS | Mod: S$GLB,,, | Performed by: NURSE PRACTITIONER

## 2019-08-15 PROCEDURE — 3074F PR MOST RECENT SYSTOLIC BLOOD PRESSURE < 130 MM HG: ICD-10-PCS | Mod: CPTII,S$GLB,, | Performed by: NURSE PRACTITIONER

## 2019-08-15 PROCEDURE — 80048 BASIC METABOLIC PNL TOTAL CA: CPT

## 2019-08-15 PROCEDURE — 99999 PR PBB SHADOW E&M-EST. PATIENT-LVL III: ICD-10-PCS | Mod: PBBFAC,,, | Performed by: NURSE PRACTITIONER

## 2019-08-15 PROCEDURE — 1101F PR PT FALLS ASSESS DOC 0-1 FALLS W/OUT INJ PAST YR: ICD-10-PCS | Mod: CPTII,S$GLB,, | Performed by: NURSE PRACTITIONER

## 2019-08-15 NOTE — TELEPHONE ENCOUNTER
----- Message from Cecile Tobin sent at 8/15/2019 12:41 PM CDT -----  Contact: daughter Bailey   Patient daughter requesting to speak to nurse regarding     Patient had fall in parking a lot 2 days ago ,and went to hospital and discharged with  broke shoulder ,urine is coca color per daughter      Patient daughter states should she schedule with Dr. Stout or should patient be referred somewhere else per daughter       Please call Rahel at [phone]

## 2019-08-15 NOTE — TELEPHONE ENCOUNTER
Pt is experiencing dark colored urine, daughter is concerned. Pt does not have a urologist or nephrologist, scheduled same day appt for pt to be evaluated.

## 2019-08-16 NOTE — PROGRESS NOTES
"This dictation has been generated using Modal Fluency Dictation some phonetic errors may occur. Please contact author for clarification if needed.     Problem List Items Addressed This Visit     Parkinson disease    Frequent falls      Other Visit Diagnoses     UTI symptoms    -  Primary    Relevant Orders    POCT urine dipstick without microscope (Completed)    STEWART (acute kidney injury)        Relevant Orders    Basic metabolic panel (Completed)          Orders Placed This Encounter    Basic metabolic panel    POCT urine dipstick without microscope     Point of service urinalysis negative for UTI per my interpretation.  Prior acute kidney injury recheck BMP.  I will review results and address accordingly.  Parkinson's disease and frequent falls.  No head injury.    No follow-ups on file.    ________________________________________________________________  ________________________________________________________________      Chief Complaint   Patient presents with    Urinary Tract Infection     possible uti, "urine is as dark as cola"     History of present illness  This 79 y.o. presents today for complaint of recent fall.  He had a fall a few days ago.  Patient has had a fall previously due to Parkinson's disease and balance issues.  He did not hit his head.  He denies any pain or bruising.  They made the appointment because they were concerned about urine changes.  His urine was dark and had a little bit of odor.  He denies any other symptomatology.  Review of systems  No fever chills  No urinary urgency frequency dysuria  No chest pain or shortness of breath  Denies unilateral weakness  Does note left shoulder pain due to history of fracture from prior injury.  He is wearing the sling.    Past medical social history reviewed    Past Medical History:   Diagnosis Date    Anxiety 1/25/2012    Arthritis of knee 1/25/2012    Back pain 1/25/2012    BPH (benign prostatic hyperplasia) 1/25/2012    Essential " tremor 3/19/2014    Kyphoscoliosis 1/25/2012    Parkinson's disease        Past Surgical History:   Procedure Laterality Date    APPENDECTOMY      FRACTURE SURGERY Left Dec 18, 2014    HEMORRHOID SURGERY      PROSTATE SURGERY  2008    TURP and had renal insu from obsr    TONSILLECTOMY         Family History   Problem Relation Age of Onset    Heart disease Mother 79        Coronary stent    Parkinsonism Father 83       Social History     Socioeconomic History    Marital status:      Spouse name: Not on file    Number of children: Not on file    Years of education: Not on file    Highest education level: Not on file   Occupational History    Not on file   Social Needs    Financial resource strain: Not on file    Food insecurity:     Worry: Not on file     Inability: Not on file    Transportation needs:     Medical: Not on file     Non-medical: Not on file   Tobacco Use    Smoking status: Former Smoker    Smokeless tobacco: Never Used   Substance and Sexual Activity    Alcohol use: No    Drug use: No    Sexual activity: Not on file   Lifestyle    Physical activity:     Days per week: Not on file     Minutes per session: Not on file    Stress: Not on file   Relationships    Social connections:     Talks on phone: Not on file     Gets together: Not on file     Attends Hindu service: Not on file     Active member of club or organization: Not on file     Attends meetings of clubs or organizations: Not on file     Relationship status: Not on file   Other Topics Concern    Not on file   Social History Narrative    Not on file       Current Outpatient Medications   Medication Sig Dispense Refill    losartan (COZAAR) 50 MG tablet Take 1 tablet (50 mg total) by mouth once daily. 90 tablet 1    carbidopa-levodopa  mg (SINEMET)  mg per tablet Take 2 tablets by mouth 3 (three) times daily.       clonazePAM (KLONOPIN) 1 MG tablet Take 1 tablet (1 mg total) by mouth every evening.  90 tablet 1    HYDROcodone-acetaminophen (NORCO) 5-325 mg per tablet Take 1 tablet by mouth every 6 (six) hours as needed. 12 tablet 0    oxyCODONE-acetaminophen (PERCOCET) 5-325 mg per tablet Take 1 tablet by mouth every 24 hours as needed for Pain.       No current facility-administered medications for this visit.        Review of patient's allergies indicates:  No Known Allergies    Physical examination  Vitals Reviewed  Gen. Well-dressed well-nourished   Skin warm dry and intact.  No rashes noted.  Chest.  Respirations are even unlabored.  Lungs are clear to auscultation.  Cardiac regular rate and rhythm.  No chest wall adenopathy noted.  Neuro. Awake alert oriented x4.  Normal judgment and cognition noted. Fingers neurovascularly intact on left.  Extremities no clubbing cyanosis or edema noted. Left arm in a sling.    Call or return to clinic prn if these symptoms worsen or fail to improve as anticipated.

## 2019-08-19 ENCOUNTER — TELEPHONE (OUTPATIENT)
Dept: FAMILY MEDICINE | Facility: CLINIC | Age: 79
End: 2019-08-19

## 2019-08-19 NOTE — TELEPHONE ENCOUNTER
----- Message from Catie Rupa sent at 8/19/2019  4:52 PM CDT -----  Contact: [facility]  Type: Needs Medical Advice    Who Called:  [facility] nurse    Best Call Back Number:  or   Additional Information: requesting a Rx refill on pain meds

## 2019-08-19 NOTE — TELEPHONE ENCOUNTER
Spoke w/ pt. He states he broke his arm Tuesday and he is out of pain meds. Offered several options for an appt. Appt made for 120pm 8/21/19, per pt request w/ Dr Rodriguez.

## 2019-08-27 PROBLEM — S42.292A CLOSED 3-PART FRACTURE OF PROXIMAL END OF LEFT HUMERUS: Status: ACTIVE | Noted: 2019-08-27

## 2019-10-14 ENCOUNTER — OFFICE VISIT (OUTPATIENT)
Dept: FAMILY MEDICINE | Facility: CLINIC | Age: 79
End: 2019-10-14
Payer: MEDICARE

## 2019-10-14 VITALS
SYSTOLIC BLOOD PRESSURE: 124 MMHG | OXYGEN SATURATION: 96 % | WEIGHT: 114.5 LBS | DIASTOLIC BLOOD PRESSURE: 70 MMHG | BODY MASS INDEX: 18.4 KG/M2 | HEART RATE: 83 BPM | HEIGHT: 66 IN | TEMPERATURE: 98 F

## 2019-10-14 DIAGNOSIS — F51.04 PSYCHOPHYSIOLOGICAL INSOMNIA: ICD-10-CM

## 2019-10-14 PROCEDURE — 3074F PR MOST RECENT SYSTOLIC BLOOD PRESSURE < 130 MM HG: ICD-10-PCS | Mod: CPTII,S$GLB,, | Performed by: FAMILY MEDICINE

## 2019-10-14 PROCEDURE — 99214 PR OFFICE/OUTPT VISIT, EST, LEVL IV, 30-39 MIN: ICD-10-PCS | Mod: S$GLB,,, | Performed by: FAMILY MEDICINE

## 2019-10-14 PROCEDURE — 3078F DIAST BP <80 MM HG: CPT | Mod: CPTII,S$GLB,, | Performed by: FAMILY MEDICINE

## 2019-10-14 PROCEDURE — 3078F PR MOST RECENT DIASTOLIC BLOOD PRESSURE < 80 MM HG: ICD-10-PCS | Mod: CPTII,S$GLB,, | Performed by: FAMILY MEDICINE

## 2019-10-14 PROCEDURE — 1101F PT FALLS ASSESS-DOCD LE1/YR: CPT | Mod: CPTII,S$GLB,, | Performed by: FAMILY MEDICINE

## 2019-10-14 PROCEDURE — 99214 OFFICE O/P EST MOD 30 MIN: CPT | Mod: S$GLB,,, | Performed by: FAMILY MEDICINE

## 2019-10-14 PROCEDURE — 99999 PR PBB SHADOW E&M-EST. PATIENT-LVL III: CPT | Mod: PBBFAC,,, | Performed by: FAMILY MEDICINE

## 2019-10-14 PROCEDURE — 3074F SYST BP LT 130 MM HG: CPT | Mod: CPTII,S$GLB,, | Performed by: FAMILY MEDICINE

## 2019-10-14 PROCEDURE — 1101F PR PT FALLS ASSESS DOC 0-1 FALLS W/OUT INJ PAST YR: ICD-10-PCS | Mod: CPTII,S$GLB,, | Performed by: FAMILY MEDICINE

## 2019-10-14 PROCEDURE — 99999 PR PBB SHADOW E&M-EST. PATIENT-LVL III: ICD-10-PCS | Mod: PBBFAC,,, | Performed by: FAMILY MEDICINE

## 2019-10-14 RX ORDER — CLONAZEPAM 1 MG/1
1.5 TABLET ORAL NIGHTLY
Qty: 135 TABLET | Refills: 0 | Status: SHIPPED | OUTPATIENT
Start: 2019-10-14 | End: 2020-01-30 | Stop reason: SDUPTHER

## 2019-10-14 NOTE — PROGRESS NOTES
"Subjective:       Patient ID: Toni Kamara is a 79 y.o. male.    Chief Complaint: Medication Problem (Patient would like to up his dosage of klonopins to 2mgs a day )    He is here for follow-up.  He wants to increase his Klonopin to 2 mg at night.  That is the dose that he was on for many years.  One year ago we reduced the dose because he was having trouble with daytime sleepiness.  He also takes Percocet twice a day.  He has had a previous hip fracture and recent left shoulder fracture.  He has not fallen since then.  He says that he goes to sleep around midnight or 1:00 a.m. and gets up at 9:00 a.m..  Later he says that he only sleeps 3 hr at a time.  His blood pressure has been well controlled.  He has lost a little weight.  He sees Dr. Youssef for his Parkinson's.  There was a recent adjustment of his Sinemet to take a little bit more often.    Review of Systems   Constitutional: Positive for unexpected weight change (He had lost some weight then stabilized and today he is down a few lb.).   Respiratory: Negative for chest tightness and shortness of breath.    Neurological: Positive for tremors.       Objective:     Blood pressure 124/70, pulse 83, temperature 97.9 °F (36.6 °C), temperature source Oral, height 5' 6" (1.676 m), weight 51.9 kg (114 lb 8.5 oz), SpO2 96 %.      Physical Exam   Constitutional:   He is thin and alert.   Cardiovascular: Normal rate and regular rhythm.   Pulmonary/Chest: Effort normal and breath sounds normal.   Neurological: He is alert.   He rises from a chair without assistance.  He walks fairly quickly.  He has a very prominent tremor.       Assessment:       1. Psychophysiological insomnia        Plan:       I am worried about increasing his Klonopin because of his age and his Percocet.  He says that he is planning to cut back on his Percocet.  I will prescribe enough Klonopin for him to take 1.5 mg at night.  Follow-up in 3 months.  Lab work ordered.  He got his flu shot today.  "

## 2019-11-12 ENCOUNTER — TELEPHONE (OUTPATIENT)
Dept: FAMILY MEDICINE | Facility: CLINIC | Age: 79
End: 2019-11-12

## 2019-11-12 NOTE — TELEPHONE ENCOUNTER
Offered appt with PA in [place] for evaluation of a growth on pt's head that has been growing for last few months. She will call back if they can not make this appt today

## 2019-11-12 NOTE — TELEPHONE ENCOUNTER
----- Message from Neeta Clifford sent at 11/12/2019 10:30 AM CST -----  Type:  Same Day Appointment Request    Caller is requesting a same day appointment.  Caller declined first available appointment listed below.      Name of Caller:  Daughter/Bailey Bass  When is the first available appointment?  Tomorrow  Symptoms:  egg shaped growth on back -enlarging, does not recognise the caretaker at times  Best Call Back Number:  [phone]  Additional Information:   Daughter is in town today and really needs for patient to be seen. Please call to advise.

## 2019-11-14 ENCOUNTER — HOSPITAL ENCOUNTER (OUTPATIENT)
Facility: HOSPITAL | Age: 79
Discharge: HOME HEALTH | End: 2019-11-19
Attending: EMERGENCY MEDICINE | Admitting: INTERNAL MEDICINE
Payer: MEDICARE
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
## 2019-11-14 DIAGNOSIS — I10 ESSENTIAL HYPERTENSION: ICD-10-CM

## 2019-11-14 DIAGNOSIS — R53.1 WEAKNESS: ICD-10-CM

## 2019-11-14 DIAGNOSIS — H91.93 BILATERAL HEARING LOSS, UNSPECIFIED HEARING LOSS TYPE: ICD-10-CM

## 2019-11-14 DIAGNOSIS — R53.1 WEAKNESS GENERALIZED: Primary | ICD-10-CM

## 2019-11-14 LAB
ALBUMIN SERPL BCP-MCNC: 3.3 G/DL (ref 3.5–5.2)
ALP SERPL-CCNC: 61 U/L (ref 55–135)
ALT SERPL W/O P-5'-P-CCNC: 8 U/L (ref 10–44)
ANION GAP SERPL CALC-SCNC: 11 MMOL/L (ref 8–16)
AST SERPL-CCNC: 21 U/L (ref 10–40)
BASOPHILS # BLD AUTO: 0.02 K/UL (ref 0–0.2)
BASOPHILS NFR BLD: 0.2 % (ref 0–1.9)
BILIRUB SERPL-MCNC: 1.7 MG/DL (ref 0.1–1)
BNP SERPL-MCNC: 415 PG/ML (ref 0–99)
BUN SERPL-MCNC: 46 MG/DL (ref 8–23)
CALCIUM SERPL-MCNC: 8.5 MG/DL (ref 8.7–10.5)
CHLORIDE SERPL-SCNC: 104 MMOL/L (ref 95–110)
CK SERPL-CCNC: 205 U/L (ref 20–200)
CO2 SERPL-SCNC: 27 MMOL/L (ref 23–29)
CREAT SERPL-MCNC: 1.4 MG/DL (ref 0.5–1.4)
DIFFERENTIAL METHOD: ABNORMAL
EOSINOPHIL # BLD AUTO: 0 K/UL (ref 0–0.5)
EOSINOPHIL NFR BLD: 0 % (ref 0–8)
ERYTHROCYTE [DISTWIDTH] IN BLOOD BY AUTOMATED COUNT: 13.2 % (ref 11.5–14.5)
EST. GFR  (AFRICAN AMERICAN): 55.2 ML/MIN/1.73 M^2
EST. GFR  (NON AFRICAN AMERICAN): 47.8 ML/MIN/1.73 M^2
GLUCOSE SERPL-MCNC: 109 MG/DL (ref 70–110)
HCT VFR BLD AUTO: 38.8 % (ref 40–54)
HGB BLD-MCNC: 12.7 G/DL (ref 14–18)
IMM GRANULOCYTES # BLD AUTO: 0.08 K/UL (ref 0–0.04)
IMM GRANULOCYTES NFR BLD AUTO: 0.7 % (ref 0–0.5)
INR PPP: 1.2
LYMPHOCYTES # BLD AUTO: 0.3 K/UL (ref 1–4.8)
LYMPHOCYTES NFR BLD: 2.2 % (ref 18–48)
MCH RBC QN AUTO: 31.5 PG (ref 27–31)
MCHC RBC AUTO-ENTMCNC: 32.7 G/DL (ref 32–36)
MCV RBC AUTO: 96 FL (ref 82–98)
MONOCYTES # BLD AUTO: 0.8 K/UL (ref 0.3–1)
MONOCYTES NFR BLD: 6.8 % (ref 4–15)
NEUTROPHILS # BLD AUTO: 11 K/UL (ref 1.8–7.7)
NEUTROPHILS NFR BLD: 90.1 % (ref 38–73)
NRBC BLD-RTO: 0 /100 WBC
PLATELET # BLD AUTO: 117 K/UL (ref 150–350)
PMV BLD AUTO: 10.8 FL (ref 9.2–12.9)
POTASSIUM SERPL-SCNC: 3.4 MMOL/L (ref 3.5–5.1)
PROT SERPL-MCNC: 6.6 G/DL (ref 6–8.4)
PROTHROMBIN TIME: 14.4 SEC (ref 10.6–14.8)
RBC # BLD AUTO: 4.03 M/UL (ref 4.6–6.2)
SODIUM SERPL-SCNC: 142 MMOL/L (ref 136–145)
TROPONIN I SERPL DL<=0.01 NG/ML-MCNC: <0.03 NG/ML (ref 0.02–0.04)
WBC # BLD AUTO: 12.21 K/UL (ref 3.9–12.7)

## 2019-11-14 PROCEDURE — 80053 COMPREHEN METABOLIC PANEL: CPT

## 2019-11-14 PROCEDURE — 85025 COMPLETE CBC W/AUTO DIFF WBC: CPT

## 2019-11-14 PROCEDURE — 84484 ASSAY OF TROPONIN QUANT: CPT

## 2019-11-14 PROCEDURE — 81001 URINALYSIS AUTO W/SCOPE: CPT

## 2019-11-14 PROCEDURE — 36415 COLL VENOUS BLD VENIPUNCTURE: CPT

## 2019-11-14 PROCEDURE — 96365 THER/PROPH/DIAG IV INF INIT: CPT

## 2019-11-14 PROCEDURE — 99285 EMERGENCY DEPT VISIT HI MDM: CPT | Mod: 25

## 2019-11-14 PROCEDURE — 82550 ASSAY OF CK (CPK): CPT

## 2019-11-14 PROCEDURE — 93005 ELECTROCARDIOGRAM TRACING: CPT

## 2019-11-14 PROCEDURE — 83880 ASSAY OF NATRIURETIC PEPTIDE: CPT

## 2019-11-14 PROCEDURE — 85610 PROTHROMBIN TIME: CPT

## 2019-11-14 RX ORDER — NAPROXEN SODIUM 220 MG/1
324 TABLET, FILM COATED ORAL ONCE
Status: DISCONTINUED | OUTPATIENT
Start: 2019-11-14 | End: 2019-11-14

## 2019-11-15 PROBLEM — E87.6 HYPOKALEMIA: Status: ACTIVE | Noted: 2019-11-15

## 2019-11-15 PROBLEM — R53.1 WEAKNESS GENERALIZED: Status: ACTIVE | Noted: 2018-07-15

## 2019-11-15 PROBLEM — N39.0 UTI (URINARY TRACT INFECTION): Status: ACTIVE | Noted: 2019-11-15

## 2019-11-15 PROBLEM — R53.1 WEAKNESS: Status: ACTIVE | Noted: 2019-11-15

## 2019-11-15 PROBLEM — R79.89 PRERENAL AZOTEMIA: Status: ACTIVE | Noted: 2019-11-15

## 2019-11-15 LAB
BACTERIA #/AREA URNS HPF: ABNORMAL /HPF
BILIRUB UR QL STRIP: NEGATIVE
CLARITY UR: CLEAR
COLOR UR: YELLOW
GLUCOSE UR QL STRIP: NEGATIVE
HGB UR QL STRIP: ABNORMAL
HYALINE CASTS #/AREA URNS LPF: 231 /LPF
KETONES UR QL STRIP: ABNORMAL
LEUKOCYTE ESTERASE UR QL STRIP: ABNORMAL
MICROSCOPIC COMMENT: ABNORMAL
NITRITE UR QL STRIP: NEGATIVE
PH UR STRIP: 6 [PH] (ref 5–8)
PROT UR QL STRIP: ABNORMAL
RBC #/AREA URNS HPF: 2 /HPF (ref 0–4)
SP GR UR STRIP: 1.01 (ref 1–1.03)
SQUAMOUS #/AREA URNS HPF: 49 /HPF
URN SPEC COLLECT METH UR: ABNORMAL
UROBILINOGEN UR STRIP-ACNC: NEGATIVE EU/DL
WBC #/AREA URNS HPF: 11 /HPF (ref 0–5)

## 2019-11-15 PROCEDURE — 63600175 PHARM REV CODE 636 W HCPCS: Mod: GZ | Performed by: INTERNAL MEDICINE

## 2019-11-15 PROCEDURE — 97535 SELF CARE MNGMENT TRAINING: CPT

## 2019-11-15 PROCEDURE — G0378 HOSPITAL OBSERVATION PER HR: HCPCS

## 2019-11-15 PROCEDURE — 63600175 PHARM REV CODE 636 W HCPCS: Performed by: EMERGENCY MEDICINE

## 2019-11-15 PROCEDURE — 97161 PT EVAL LOW COMPLEX 20 MIN: CPT

## 2019-11-15 PROCEDURE — 97116 GAIT TRAINING THERAPY: CPT

## 2019-11-15 PROCEDURE — 25000003 PHARM REV CODE 250: Performed by: INTERNAL MEDICINE

## 2019-11-15 PROCEDURE — 94760 N-INVAS EAR/PLS OXIMETRY 1: CPT

## 2019-11-15 PROCEDURE — 97166 OT EVAL MOD COMPLEX 45 MIN: CPT

## 2019-11-15 RX ORDER — OXYCODONE AND ACETAMINOPHEN 5; 325 MG/1; MG/1
1 TABLET ORAL EVERY 12 HOURS PRN
Status: DISCONTINUED | OUTPATIENT
Start: 2019-11-15 | End: 2019-11-15

## 2019-11-15 RX ORDER — POTASSIUM CHLORIDE 7.45 MG/ML
40 INJECTION INTRAVENOUS
Status: DISCONTINUED | OUTPATIENT
Start: 2019-11-15 | End: 2019-11-19 | Stop reason: HOSPADM

## 2019-11-15 RX ORDER — LANOLIN ALCOHOL/MO/W.PET/CERES
800 CREAM (GRAM) TOPICAL
Status: DISCONTINUED | OUTPATIENT
Start: 2019-11-15 | End: 2019-11-19 | Stop reason: HOSPADM

## 2019-11-15 RX ORDER — CEFTRIAXONE 1 G/1
1 INJECTION, POWDER, FOR SOLUTION INTRAMUSCULAR; INTRAVENOUS
Status: DISCONTINUED | OUTPATIENT
Start: 2019-11-15 | End: 2019-11-15

## 2019-11-15 RX ORDER — SODIUM CHLORIDE 9 MG/ML
INJECTION, SOLUTION INTRAVENOUS CONTINUOUS
Status: DISCONTINUED | OUTPATIENT
Start: 2019-11-15 | End: 2019-11-17

## 2019-11-15 RX ORDER — POTASSIUM CHLORIDE 20 MEQ/1
40 TABLET, EXTENDED RELEASE ORAL
Status: DISCONTINUED | OUTPATIENT
Start: 2019-11-15 | End: 2019-11-19 | Stop reason: HOSPADM

## 2019-11-15 RX ORDER — MAGNESIUM SULFATE HEPTAHYDRATE 40 MG/ML
4 INJECTION, SOLUTION INTRAVENOUS
Status: DISCONTINUED | OUTPATIENT
Start: 2019-11-15 | End: 2019-11-19 | Stop reason: HOSPADM

## 2019-11-15 RX ORDER — MAGNESIUM SULFATE HEPTAHYDRATE 40 MG/ML
2 INJECTION, SOLUTION INTRAVENOUS
Status: DISCONTINUED | OUTPATIENT
Start: 2019-11-15 | End: 2019-11-19 | Stop reason: HOSPADM

## 2019-11-15 RX ORDER — POTASSIUM CHLORIDE 7.45 MG/ML
20 INJECTION INTRAVENOUS
Status: DISCONTINUED | OUTPATIENT
Start: 2019-11-15 | End: 2019-11-19 | Stop reason: HOSPADM

## 2019-11-15 RX ORDER — POTASSIUM CHLORIDE 20 MEQ/1
20 TABLET, EXTENDED RELEASE ORAL
Status: DISCONTINUED | OUTPATIENT
Start: 2019-11-15 | End: 2019-11-19 | Stop reason: HOSPADM

## 2019-11-15 RX ORDER — CALCIUM CHLORIDE IN 0.9 % NACL 1 G/100 ML
1 INTRAVENOUS SOLUTION, PIGGYBACK (ML) INTRAVENOUS
Status: DISCONTINUED | OUTPATIENT
Start: 2019-11-15 | End: 2019-11-19 | Stop reason: HOSPADM

## 2019-11-15 RX ORDER — CARBIDOPA AND LEVODOPA 25; 100 MG/1; MG/1
2 TABLET ORAL 3 TIMES DAILY
Status: DISCONTINUED | OUTPATIENT
Start: 2019-11-15 | End: 2019-11-19 | Stop reason: HOSPADM

## 2019-11-15 RX ORDER — MAGNESIUM SULFATE 1 G/100ML
1 INJECTION INTRAVENOUS
Status: DISCONTINUED | OUTPATIENT
Start: 2019-11-15 | End: 2019-11-19 | Stop reason: HOSPADM

## 2019-11-15 RX ORDER — LOSARTAN POTASSIUM 50 MG/1
50 TABLET ORAL DAILY
Status: DISCONTINUED | OUTPATIENT
Start: 2019-11-15 | End: 2019-11-19 | Stop reason: HOSPADM

## 2019-11-15 RX ADMIN — CARBIDOPA AND LEVODOPA 2 TABLET: 25; 100 TABLET ORAL at 02:11

## 2019-11-15 RX ADMIN — SODIUM CHLORIDE: 0.9 INJECTION, SOLUTION INTRAVENOUS at 07:11

## 2019-11-15 RX ADMIN — CLONAZEPAM 1.5 MG: 1 TABLET ORAL at 08:11

## 2019-11-15 RX ADMIN — CARBIDOPA AND LEVODOPA 2 TABLET: 25; 100 TABLET ORAL at 08:11

## 2019-11-15 RX ADMIN — POTASSIUM CHLORIDE 40 MEQ: 20 TABLET, EXTENDED RELEASE ORAL at 04:11

## 2019-11-15 RX ADMIN — OXYCODONE AND ACETAMINOPHEN 1 TABLET: 5; 325 TABLET ORAL at 06:11

## 2019-11-15 RX ADMIN — LOSARTAN POTASSIUM 50 MG: 50 TABLET, FILM COATED ORAL at 08:11

## 2019-11-15 RX ADMIN — CEFTRIAXONE 1 G: 1 INJECTION, SOLUTION INTRAVENOUS at 02:11

## 2019-11-15 NOTE — PLAN OF CARE
Patient presents with UTI has parkinson's seem malnourished care giver says  the patient still drives and needs assisatance with all ADL's has fallen sedveral times in the last 3 months  he is making copius amount of thick sputum have set up suction to bedside.

## 2019-11-15 NOTE — SUBJECTIVE & OBJECTIVE
Interval History still with weakness no improvement status post fall at home  Review of Systems no fever no chills no chest pain or shortness of breath no abdominal pain.  Patient with increased urination.  Generalized weakness.  Otherwise comprehensive review of systems are without changes.  Objective:     Vital Signs (Most Recent):  Temp: 99 °F (37.2 °C) (11/15/19 0755)  Pulse: 78 (11/15/19 0757)  Resp: 18 (11/15/19 0757)  BP: 131/65 (11/15/19 0755)  SpO2: 96 % (11/15/19 0757) Vital Signs (24h Range):  Temp:  [97.8 °F (36.6 °C)-99 °F (37.2 °C)] 99 °F (37.2 °C)  Pulse:  [78-96] 78  Resp:  [16-20] 18  SpO2:  [96 %-98 %] 96 %  BP: (104-152)/(65-69) 131/65     Weight: 56.7 kg (125 lb)  Body mass index is 20.18 kg/m².    Intake/Output Summary (Last 24 hours) at 11/15/2019 1058  Last data filed at 11/15/2019 0248  Gross per 24 hour   Intake 50 ml   Output --   Net 50 ml      Physical Exam patient appears frail week lying in bed.  His caregivers are at bedside.  HEENT sclerae nonicteric  Neck is supple nontender  Lungs are clear but coarse  Heart is regular rate and rhythm  Abdomen is soft nontender  Extremities no edema  Neuro patient is alert he is oriented he does have a resting tremor   exam no Jasso  Psych patient is cooperative pleasant    Significant Labs:   BMP:   Recent Labs   Lab 11/14/19 2155         K 3.4*      CO2 27   BUN 46*   CREATININE 1.4   CALCIUM 8.5*     CBC:   Recent Labs   Lab 11/14/19 2155   WBC 12.21   HGB 12.7*   HCT 38.8*   *     CMP:   Recent Labs   Lab 11/14/19 2155      K 3.4*      CO2 27      BUN 46*   CREATININE 1.4   CALCIUM 8.5*   PROT 6.6   ALBUMIN 3.3*   BILITOT 1.7*   ALKPHOS 61   AST 21   ALT 8*   ANIONGAP 11   EGFRNONAA 47.8*

## 2019-11-15 NOTE — PROGRESS NOTES
"[facility] Medicine  Progress Note    Patient Name: Toni Kamara  MRN: [id number]  Patient Class: OP- Observation   Admission Date: 11/14/2019  Length of Stay: 0 days  Attending Physician: Ventura Holder DO  Primary Care Provider: Harinder Stout MD        Subjective:     Principal Problem:Weakness generalized        HPI:  79 year male brought to ED for weakness, fatigue and falling at home. "Falling" = twice in past 3 days the patient slid off of his bed and onto the floor because he was too weak to hold himself up. No LOC. Did not strike head. No LOP/LOM. Suffers Parkinsonism. No CP/SOB. No history of orthopnea/PND. Laboratory analysis reveals pre-renal azotemia with dirty urine.  CT Head = no acute pathology. CXR = NAPD. EKG = no acute ischemic changes. Patient states he does NOT wish to be resuscitated in the event of cardio-pulomonary arrest    Overview/Hospital Course:  No notes on file    Interval History still with weakness no improvement status post fall at home  Review of Systems no fever no chills no chest pain or shortness of breath no abdominal pain.  Patient with increased urination.  Generalized weakness.  Otherwise comprehensive review of systems are without changes.  Objective:     Vital Signs (Most Recent):  Temp: 99 °F (37.2 °C) (11/15/19 0755)  Pulse: 78 (11/15/19 0757)  Resp: 18 (11/15/19 0757)  BP: 131/65 (11/15/19 0755)  SpO2: 96 % (11/15/19 0757) Vital Signs (24h Range):  Temp:  [97.8 °F (36.6 °C)-99 °F (37.2 °C)] 99 °F (37.2 °C)  Pulse:  [78-96] 78  Resp:  [16-20] 18  SpO2:  [96 %-98 %] 96 %  BP: (104-152)/(65-69) 131/65     Weight: 56.7 kg (125 lb)  Body mass index is 20.18 kg/m².    Intake/Output Summary (Last 24 hours) at 11/15/2019 1058  Last data filed at 11/15/2019 0248  Gross per 24 hour   Intake 50 ml   Output --   Net 50 ml      Physical Exam patient appears frail week lying in bed.  His caregivers are at bedside.  HEENT sclerae nonicteric  Neck is supple " nontender  Lungs are clear but coarse  Heart is regular rate and rhythm  Abdomen is soft nontender  Extremities no edema  Neuro patient is alert he is oriented he does have a resting tremor   exam no Jasso  Psych patient is cooperative pleasant    Significant Labs:   BMP:   Recent Labs   Lab 11/14/19 2155         K 3.4*      CO2 27   BUN 46*   CREATININE 1.4   CALCIUM 8.5*     CBC:   Recent Labs   Lab 11/14/19 2155   WBC 12.21   HGB 12.7*   HCT 38.8*   *     CMP:   Recent Labs   Lab 11/14/19  2155      K 3.4*      CO2 27      BUN 46*   CREATININE 1.4   CALCIUM 8.5*   PROT 6.6   ALBUMIN 3.3*   BILITOT 1.7*   ALKPHOS 61   AST 21   ALT 8*   ANIONGAP 11   EGFRNONAA 47.8*             Assessment/Plan:      * Weakness generalized  Generalized weakness most likely multifactorial including volume depletion and underlying urinary tract infection  PTevaluation in progress      UTI (urinary tract infection)  Acute UTI present on admission  Empiric antibiotics  Urine culture pending      Prerenal azotemia  Will continue IV fluids      Hypokalemia  Will replace use sliding scale  Check labs in a.m.      VTE Risk Mitigation (From admission, onward)         Ordered     IP VTE HIGH RISK PATIENT  Once      11/15/19 0452             Patient lives alone with full-time caregivers.  However there may be a gap of approximately 4 hr that patient is left alone.  Will have physical therapy evaluate.  Patient may need skilled nursing facility.          Ventura Holder DO  Department of Hospital Medicine   [facility]

## 2019-11-15 NOTE — HPI
"79 year male brought to ED for weakness, fatigue and falling at home. "Falling" = twice in past 3 days the patient slid off of his bed and onto the floor because he was too weak to hold himself up. No LOC. Did not strike head. No LOP/LOM. Suffers Parkinsonism. No CP/SOB. No history of orthopnea/PND. Laboratory analysis reveals pre-renal azotemia with dirty urine.  CT Head = no acute pathology. CXR = NAPD. EKG = no acute ischemic changes. Patient states he does NOT wish to be resuscitated in the event of cardio-pulomonary arrest  "

## 2019-11-15 NOTE — H&P
"[facility] Medicine  History & Physical    Patient Name: Toni Kamara  MRN: [id number]  Admission Date: 11/14/2019  Attending Physician: Sanjay Mills MD  Primary Care Provider: Harinder Stout MD         Patient information was obtained from patient and ER records.     Subjective:     Principal Problem:Weakness generalized    Chief Complaint:   Chief Complaint   Patient presents with    Weakness     fire dept called to residence for c/o fall x 3 times today. daughter reports generalized weakness along with decreased PO intake.         HPI: 79 year male brought to ED for weakness, fatigue and falling at home. "Falling" = twice in past 3 days the patient slid off of his bed and onto the floor because he was too weak to hold himself up. No LOC. Did not strike head. No LOP/LOM. Suffers Parkinsonism. No CP/SOB. No history of orthopnea/PND. Laboratory analysis reveals pre-renal azotemia with dirty urine.  CT Head = no acute pathology. CXR = NAPD. EKG = no acute ischemic changes. Patient states he does NOT wish to be resuscitated in the event of cardio-pulomonary arrest    Past Medical History:   Diagnosis Date    Anxiety 1/25/2012    Arthritis of knee 1/25/2012    Back pain 1/25/2012    BPH (benign prostatic hyperplasia) 1/25/2012    Essential tremor 3/19/2014    Kyphoscoliosis 1/25/2012    Parkinson's disease        Past Surgical History:   Procedure Laterality Date    APPENDECTOMY      FRACTURE SURGERY Left Dec 18, 2014    HEMORRHOID SURGERY      PROSTATE SURGERY  2008    TURP and had renal insu from obsr    TONSILLECTOMY         Review of patient's allergies indicates:  No Known Allergies    No current facility-administered medications on file prior to encounter.      Current Outpatient Medications on File Prior to Encounter   Medication Sig    carbidopa-levodopa  mg (SINEMET)  mg per tablet Take 2 tablets by mouth 3 (three) times daily.     clonazePAM (KLONOPIN) 1 MG " tablet Take 1.5 tablets (1.5 mg total) by mouth every evening.    losartan (COZAAR) 50 MG tablet Take 1 tablet (50 mg total) by mouth once daily.    oxyCODONE-acetaminophen (PERCOCET) 5-325 mg per tablet Take 1 tablet by mouth every 12 (twelve) hours as needed for Pain.     Family History     Problem Relation (Age of Onset)    Heart disease Mother (79)    Parkinsonism Father (83)        Tobacco Use    Smoking status: Former Smoker    Smokeless tobacco: Never Used   Substance and Sexual Activity    Alcohol use: No    Drug use: No    Sexual activity: Not on file     Review of Systems   Constitutional: Positive for fatigue.   HENT: Negative.    Eyes: Negative.    Respiratory: Negative.    Cardiovascular: Negative.    Gastrointestinal: Negative.    Endocrine: Negative.    Genitourinary: Negative.    Musculoskeletal: Negative.    Skin: Negative.    Allergic/Immunologic: Negative.    Neurological: Positive for tremors and weakness.   Hematological: Negative.    All other systems reviewed and are negative.    Objective:     Vital Signs (Most Recent):  Temp: 97.8 °F (36.6 °C) (11/14/19 2128)  Pulse: 96 (11/14/19 2128)  Resp: 20 (11/14/19 2128)  BP: 104/66 (11/14/19 2128)  SpO2: 97 % (11/14/19 2128) Vital Signs (24h Range):  Temp:  [97.8 °F (36.6 °C)] 97.8 °F (36.6 °C)  Pulse:  [96] 96  Resp:  [20] 20  SpO2:  [97 %] 97 %  BP: (104)/(66) 104/66     Weight: 56.7 kg (125 lb)  Body mass index is 20.18 kg/m².    Physical Exam   Constitutional: He is oriented to person, place, and time.   Cachetic, chronically ill appearing   HENT:   Head: Normocephalic and atraumatic.   Eyes: Pupils are equal, round, and reactive to light. Conjunctivae and EOM are normal.   Neck: Normal range of motion. Neck supple.   Cardiovascular: Normal rate, regular rhythm, normal heart sounds and intact distal pulses.   Pulmonary/Chest: Effort normal and breath sounds normal.   Decreased entry bases without adventitious sounds   Abdominal: Soft.  Bowel sounds are normal.   scaphoid   Musculoskeletal: Normal range of motion.   Neurological: He is alert and oriented to person, place, and time.   Tremor and rigidity of Parkinsonism   Skin: Skin is warm and dry. Capillary refill takes less than 2 seconds.   Psychiatric: He has a normal mood and affect. His behavior is normal. Judgment and thought content normal.   Nursing note and vitals reviewed.        CRANIAL NERVES     CN III, IV, VI   Pupils are equal, round, and reactive to light.  Extraocular motions are normal.        Significant Labs:   CBC:   Recent Labs   Lab 11/14/19 2155   WBC 12.21   HGB 12.7*   HCT 38.8*   *     CMP:   Recent Labs   Lab 11/14/19 2155      K 3.4*      CO2 27      BUN 46*   CREATININE 1.4   CALCIUM 8.5*   PROT 6.6   ALBUMIN 3.3*   BILITOT 1.7*   ALKPHOS 61   AST 21   ALT 8*   ANIONGAP 11   EGFRNONAA 47.8*     Cardiac Markers:   Recent Labs   Lab 11/14/19 2155   *       Significant Imaging: I have reviewed and interpreted all pertinent imaging results/findings within the past 24 hours.    Assessment/Plan:     * Weakness generalized    Hydration, Rocephin for UTI, PT/OT eval and treat    Prerenal azotemia    Hydration    UTI (urinary tract infection)  Hydration, Rocephin        VTE Risk Mitigation (From admission, onward)    None             Sanjay Mills MD  Department of Hospital Medicine   [facility]

## 2019-11-15 NOTE — ED NOTES
Patient identifiers for Toni Kamara checked and correct.  LOC:  Toni Kamara is awake, alert, and aware of environment with an appropriate affect. He is oriented x 3 and speaking appropriately.  APPEARANCE:  He is resting comfortably and in no acute distress. He is clean and well groomed, patient's clothing is properly fastened.  SKIN:  The skin is warm and dry. He has normal skin turgor and moist mucus membranes. Skin is intact; no bruising or breakdown noted.  MUSCULOSKELETAL:  He is moving all extremities except for he is really weak. no obvious deformities noted. Pulses intact.   RESPIRATORY:  Airway is open and patent. Respirations are spontaneous and non-labored with normal effort and rate.  CARDIAC:  He has a normal rate and rhythm. No peripheral edema noted. Capillary refill < 3 seconds.  ABDOMEN:  No distention noted.  Soft and non-tender upon palpation.  NEUROLOGICAL:  PERRL. Facial expression is symmetrical. Hand grasps are equal bilaterally. Normal sensation in all extremities when touched with finger.  Allergies reported:  Review of patient's allergies indicates:  No Known Allergies  OTHER NOTES:  Patient fell tonight and states that he has been falling a lot lately and does not know why.

## 2019-11-15 NOTE — ED NOTES
"Notified by CT that family is concerned about patient receiving cat scan ordered by MD in the ER. Daughter called off of sitters phone per request for clarification. Updated daughter on POC including lab work and diagnostic imaging ordered by MD after evaluation of pt. Daughter states 'I don't want him to have a cat scan done at this time. I want to start with lab work and get some urine because I think that may be the cause". Asked for clarification from daughter in regards to events that precipitated prior to arrival to ER. States "He fell multiple times today from the bed to the floor". Informed daughter the need for the CT per the MD evaluation. Attempted to discuss benefits versus risk with daughter. Insisted CT not performed on patient at this time. Sitters at bedside for conversation. Informed MD of conversation and daughter request. MD at bedside to discuss recommendations with family. Per MD, daughter consented to CT scan.   "

## 2019-11-15 NOTE — SUBJECTIVE & OBJECTIVE
Past Medical History:   Diagnosis Date    Anxiety 1/25/2012    Arthritis of knee 1/25/2012    Back pain 1/25/2012    BPH (benign prostatic hyperplasia) 1/25/2012    Essential tremor 3/19/2014    Kyphoscoliosis 1/25/2012    Parkinson's disease        Past Surgical History:   Procedure Laterality Date    APPENDECTOMY      FRACTURE SURGERY Left Dec 18, 2014    HEMORRHOID SURGERY      PROSTATE SURGERY  2008    TURP and had renal insu from obsr    TONSILLECTOMY         Review of patient's allergies indicates:  No Known Allergies    No current facility-administered medications on file prior to encounter.      Current Outpatient Medications on File Prior to Encounter   Medication Sig    carbidopa-levodopa  mg (SINEMET)  mg per tablet Take 2 tablets by mouth 3 (three) times daily.     clonazePAM (KLONOPIN) 1 MG tablet Take 1.5 tablets (1.5 mg total) by mouth every evening.    losartan (COZAAR) 50 MG tablet Take 1 tablet (50 mg total) by mouth once daily.    oxyCODONE-acetaminophen (PERCOCET) 5-325 mg per tablet Take 1 tablet by mouth every 12 (twelve) hours as needed for Pain.     Family History     Problem Relation (Age of Onset)    Heart disease Mother (79)    Parkinsonism Father (83)        Tobacco Use    Smoking status: Former Smoker    Smokeless tobacco: Never Used   Substance and Sexual Activity    Alcohol use: No    Drug use: No    Sexual activity: Not on file     Review of Systems   Constitutional: Positive for fatigue.   HENT: Negative.    Eyes: Negative.    Respiratory: Negative.    Cardiovascular: Negative.    Gastrointestinal: Negative.    Endocrine: Negative.    Genitourinary: Negative.    Musculoskeletal: Negative.    Skin: Negative.    Allergic/Immunologic: Negative.    Neurological: Positive for tremors and weakness.   Hematological: Negative.    All other systems reviewed and are negative.    Objective:     Vital Signs (Most Recent):  Temp: 97.8 °F (36.6 °C) (11/14/19  2128)  Pulse: 96 (11/14/19 2128)  Resp: 20 (11/14/19 2128)  BP: 104/66 (11/14/19 2128)  SpO2: 97 % (11/14/19 2128) Vital Signs (24h Range):  Temp:  [97.8 °F (36.6 °C)] 97.8 °F (36.6 °C)  Pulse:  [96] 96  Resp:  [20] 20  SpO2:  [97 %] 97 %  BP: (104)/(66) 104/66     Weight: 56.7 kg (125 lb)  Body mass index is 20.18 kg/m².    Physical Exam   Constitutional: He is oriented to person, place, and time.   Cachetic, chronically ill appearing   HENT:   Head: Normocephalic and atraumatic.   Eyes: Pupils are equal, round, and reactive to light. Conjunctivae and EOM are normal.   Neck: Normal range of motion. Neck supple.   Cardiovascular: Normal rate, regular rhythm, normal heart sounds and intact distal pulses.   Pulmonary/Chest: Effort normal and breath sounds normal.   Decreased entry bases without adventitious sounds   Abdominal: Soft. Bowel sounds are normal.   scaphoid   Musculoskeletal: Normal range of motion.   Neurological: He is alert and oriented to person, place, and time.   Tremor and rigidity of Parkinsonism   Skin: Skin is warm and dry. Capillary refill takes less than 2 seconds.   Psychiatric: He has a normal mood and affect. His behavior is normal. Judgment and thought content normal.   Nursing note and vitals reviewed.        CRANIAL NERVES     CN III, IV, VI   Pupils are equal, round, and reactive to light.  Extraocular motions are normal.        Significant Labs:   CBC:   Recent Labs   Lab 11/14/19 2155   WBC 12.21   HGB 12.7*   HCT 38.8*   *     CMP:   Recent Labs   Lab 11/14/19 2155      K 3.4*      CO2 27      BUN 46*   CREATININE 1.4   CALCIUM 8.5*   PROT 6.6   ALBUMIN 3.3*   BILITOT 1.7*   ALKPHOS 61   AST 21   ALT 8*   ANIONGAP 11   EGFRNONAA 47.8*     Cardiac Markers:   Recent Labs   Lab 11/14/19 2155   *       Significant Imaging: I have reviewed and interpreted all pertinent imaging results/findings within the past 24 hours.

## 2019-11-15 NOTE — PLAN OF CARE
Problem: Fall Injury Risk  Goal: Absence of Fall and Fall-Related Injury  Outcome: Ongoing, Progressing     Problem: Adult Inpatient Plan of Care  Goal: Plan of Care Review  Outcome: Ongoing, Progressing  Goal: Patient-Specific Goal (Individualization)  Outcome: Ongoing, Progressing  Goal: Absence of Hospital-Acquired Illness or Injury  Outcome: Ongoing, Progressing  Goal: Optimal Comfort and Wellbeing  Outcome: Ongoing, Progressing  Goal: Readiness for Transition of Care  Outcome: Ongoing, Progressing  Goal: Rounds/Family Conference  Outcome: Ongoing, Progressing     Problem: Skin Injury Risk Increased  Goal: Skin Health and Integrity  Outcome: Ongoing, Progressing

## 2019-11-15 NOTE — ED PROVIDER NOTES
Encounter Date: 11/14/2019       History     Chief Complaint   Patient presents with    Weakness     fire dept called to residence for c/o fall x 3 times today. daughter reports generalized weakness along with decreased PO intake.      HPI     Seen and evaluated.  Reports of generalized weakness and multiple falls at home.  Patient has history of parkinsonism.  Discussed care with the daughter who is power of .  She does not live in the city with the patient, but communicated via telephone.  Patient able to provide some history.  He is generally weak.  He has advanced parkinsonism.  Reports that he has decreased ambulatory capacity recently.  This is a change.  Also endorses generalized weakness.        Review of patient's allergies indicates:  No Known Allergies  Past Medical History:   Diagnosis Date    Anxiety 1/25/2012    Arthritis of knee 1/25/2012    Back pain 1/25/2012    BPH (benign prostatic hyperplasia) 1/25/2012    Essential tremor 3/19/2014    Kyphoscoliosis 1/25/2012    Parkinson's disease      Past Surgical History:   Procedure Laterality Date    APPENDECTOMY      FRACTURE SURGERY Left Dec 18, 2014    HEMORRHOID SURGERY      PROSTATE SURGERY  2008    TURP and had renal insu from obsr    TONSILLECTOMY       Family History   Problem Relation Age of Onset    Heart disease Mother 79        Coronary stent    Parkinsonism Father 83     Social History     Tobacco Use    Smoking status: Former Smoker    Smokeless tobacco: Never Used   Substance Use Topics    Alcohol use: No    Drug use: No     Review of Systems   Constitutional: Positive for activity change and fatigue. Negative for fever.   HENT: Negative for sore throat.    Respiratory: Negative for shortness of breath.    Cardiovascular: Negative for chest pain.   Gastrointestinal: Negative for nausea.   Genitourinary: Negative for dysuria.   Musculoskeletal: Negative for back pain.   Skin: Negative for rash.   Neurological:  Positive for tremors and weakness.   Hematological: Does not bruise/bleed easily.   All other systems reviewed and are negative.      Physical Exam     Initial Vitals [11/14/19 2128]   BP Pulse Resp Temp SpO2   104/66 96 20 97.8 °F (36.6 °C) 97 %      MAP       --         Physical Exam    Nursing note and vitals reviewed.  Constitutional: Vital signs are normal.   frail   HENT:   Head: Normocephalic and atraumatic.   Eyes: Conjunctivae are normal.   Neck: Neck supple.   Cardiovascular: Normal rate and regular rhythm.   Pulmonary/Chest: Breath sounds normal.   Abdominal: Soft. Normal appearance.   Musculoskeletal: Normal range of motion.   Neurological: He is alert and oriented to person, place, and time.   Skin: Skin is warm and dry.   Psychiatric: He has a normal mood and affect.         ED Course   Procedures  Labs Reviewed - No data to display       Imaging Results    None          Medical Decision Making:   Initial Assessment:   Patient with multiple falls and generalized weakness appears to be condition. Discussed CT head with daughter who initially was reluctant but discussed that there could be underlying pathology that may need addressing during this visit.  She agreed to CT head.  Discussed care with hospital medicine will observe patient                                 Clinical Impression:       ICD-10-CM ICD-9-CM   1. Weakness generalized R53.1 780.79   2. Weakness R53.1 780.79   3. Fall W19.XXXA E888.9   4. Bilateral hearing loss, unspecified hearing loss type H91.93 389.9   5. Essential hypertension I10 401.9                             Sixto Silveira Jr., MD  11/20/19 2972

## 2019-11-15 NOTE — PT/OT/SLP EVAL
Physical Therapy Evaluation    Patient Name:  Toni Kamara   MRN:  [id number]    Recommendations:     Discharge Recommendations:  nursing facility, skilled, home health PT, home   Discharge Equipment Recommendations: none   Barriers to discharge: Patient may benefit from admission to SNF for rehab prior to DC home alone due to recent fall history    Assessment:     Toni Kamara is a 79 y.o. male admitted with a medical diagnosis of Weakness generalized.  He presents with the following impairments/functional limitations:  weakness, impaired endurance, impaired functional mobilty, gait instability, impaired balance, decreased lower extremity function .  Patient readily agreeable to PT evaluation and gait training.  Patient presented sitting in chair at bedside.  Transfer sit to stand with CGA and patient then ambulated x 200 feet with RW with CGA.  Patient lives alone but may benefit from admission to SNF unit for rehab because patient has recent fall history with multiple falls.    Rehab Prognosis: Good; patient would benefit from acute skilled PT services to address these deficits and reach maximum level of function.    Recent Surgery: * No surgery found *      Plan:     During this hospitalization, patient to be seen 6 x/week to address the identified rehab impairments via gait training, therapeutic activities, therapeutic exercises and progress toward the following goals:    · Plan of Care Expires:  12/13/19    Subjective     Chief Complaint: falling  Patient/Family Comments/goals: go home when able  Pain/Comfort:  ·      Patients cultural, spiritual, Sikh conflicts given the current situation:      Living Environment:  Currently lives alone but has care takers most of the day.  Prior to admission, patients level of function was modified independent.  Equipment used at home: walker, rolling.  DME owned (not currently used): none.  Upon discharge, patient will have assistance from care takers in  home.    Objective:     Communicated with nurse prior to session.  Patient found supine with peripheral IV  upon PT entry to room.    General Precautions: Standard, fall   Orthopedic Precautions:    Braces:       Exams:  · RLE ROM: WFL  · RLE Strength: Deficits: 4/5 overall  · LLE ROM: WFL  · LLE Strength: Deficits: 4/5 overall    Functional Mobility:  · Transfers:     · Sit to Stand:  stand by assistance with rolling walker  · Gait: x 200 feet with RW with CGA      Therapeutic Activities and Exercises:   none given    AM-PAC 6 CLICK MOBILITY  Total Score:18     Patient left up in chair with call button in reach.    GOALS:   Multidisciplinary Problems     Physical Therapy Goals        Problem: Physical Therapy Goal    Goal Priority Disciplines Outcome Goal Variances Interventions   Physical Therapy Goal     PT, PT/OT Ongoing, Progressing     Description:  Goals to be met by: 19    Patient will increase functional independence with mobility by performin. Supine to sit with Modified [place]  2. Sit to supine with Modified [place]  3. Sit to stand transfer with Modified [place]  4. Bed to chair transfer with Modified [place] using Rolling Walker  5. Gait  x 150 feet with Supervision using Rolling Walker.                       History:     Past Medical History:   Diagnosis Date    Anxiety 2012    Arthritis of knee 2012    Back pain 2012    BPH (benign prostatic hyperplasia) 2012    Essential tremor 3/19/2014    Kyphoscoliosis 2012    Parkinson's disease        Past Surgical History:   Procedure Laterality Date    APPENDECTOMY      FRACTURE SURGERY Left Dec 18, 2014    HEMORRHOID SURGERY      PROSTATE SURGERY      TURP and had renal insu from obsr    TONSILLECTOMY         Time Tracking:     PT Received On: 11/15/19  PT Start Time: 1115     PT Stop Time: 1147  PT Total Time (min): 32 min     Billable Minutes: Evaluation 17 and Therapeutic Activity  15      Chris MeGilligan, PT  11/15/2019

## 2019-11-15 NOTE — ASSESSMENT & PLAN NOTE
Generalized weakness most likely multifactorial including volume depletion and underlying urinary tract infection  PTevaluation in progress

## 2019-11-15 NOTE — PLAN OF CARE
Problem: Occupational Therapy Goal  Goal: Occupational Therapy Goal  Description  Goals to be met by: d/c     Patient will increase functional independence with ADLs by performing:    Feeding with Minimal Assistance.  UE Dressing with Minimal Assistance.  LE Dressing with Maximum Assistance.  Toileting from bedside commode with Moderate Assistance for hygiene and clothing management.   Supine to sit with Stand-by Assistance.  Toilet transfer to bedside commode with Stand-by Assistance with RW.     Outcome: Ongoing, Progressing

## 2019-11-15 NOTE — PT/OT/SLP EVAL
Occupational Therapy   Evaluation    Name: Toni Kamara  MRN: [id number]  Admitting Diagnosis:  Weakness generalized      Recommendations:     Discharge Recommendations: home with home health  Discharge Equipment Recommendations:  none  Barriers to discharge:  None    Assessment:     Toni Kamara is a 79 y.o. male with a medical diagnosis of Weakness generalized.  He presented today with some weakness but was oriented x 3 and able to follow commands.  He appears near his baseline and has caregivers for day/night.  His caregivers report pt has had several falls over the past week (likely related to volume depletion and UTI). OT to follow pt to assist with determining appropriate d/c recommendations.  HHOT is recommended at this time.  Performance deficits affecting function: weakness, impaired endurance, impaired self care skills, impaired functional mobilty, decreased coordination, impaired cognition, impaired balance, gait instability, decreased upper extremity function, decreased safety awareness, impaired fine motor, impaired coordination.      Rehab Prognosis: Good; patient would benefit from acute skilled OT services to address these deficits and reach maximum level of function.       Plan:     Patient to be seen 3 x/week to address the above listed problems via self-care/home management, therapeutic activities, therapeutic exercises, neuromuscular re-education  · Plan of Care Expires: 12/15/19  · Plan of Care Reviewed with: patient, caregiver    Subjective     Chief Complaint: none   Patient/Family Comments/goals: pt wants to return home    Occupational Profile:  Living Environment: 1SH 0STE; walk-in shower with shower chair and grab bars; elevated toilet with grab bars  Previous level of function: Mod (I) functional mobility with RW; has hired caregivers day/night; they assist him with all  ADLs other than feeding; caregivers report that pt wears depends because he only makes it to the bathroom ~50% of the time    Roles and Routines: goes out in community with caregivers; still drives?  Equipment Used at Home:  walker, rolling, shower chair  Assistance upon Discharge: hired caregivers    Pain/Comfort:  · Pain Rating 1: 0/10  · Pain Rating Post-Intervention 1: 0/10    Objective:     Communicated with: RN prior to session.  Patient found supine with peripheral IV upon OT entry to room.    General Precautions: Standard, fall   Orthopedic Precautions:N/A   Braces: N/A     Occupational Performance:    Bed Mobility:    · Patient completed Rolling/Turning to Left with  moderate assistance and with side rail  · Patient completed Rolling/Turning to Right with moderate assistance and with side rail  · Patient completed Scooting hips to EOB with contact guard assistance  · Patient completed Supine to Sit with minimum assistance    Functional Mobility/Transfers:  · Patient completed Sit <> Stand Transfer with minimum assistance  with  rolling walker   · Patient completed Bed <> Chair Transfer using Stand Pivot technique with minimum assistance with rolling walker  · Functional Mobility: NT    Activities of Daily Living:  · Feeding:  nursing reports assisting pt with ~50% of meal 2/2 tremors and spillage    · Grooming: supervision to complete oral care seated in bedside chair; pt had difficulty attending to task with distractions; with reduced distractions he was able to complete the task without further cues  · Lower Body Dressing: total assistance to don socks  · Toileting: pt presented with soiled diaper requiring total A for hygiene/diaper change      Cognitive/Visual Perceptual:  Cognitive/Psychosocial Skills:     -       Oriented to: Person, Place and Time   -       Follows Commands/attention:Follows one-step commands  -       Memory: Impaired STM  -       Safety awareness/insight to disability: impaired   -       Mood/Affect/Coping skills/emotional control: Appropriate to situation    Physical Exam:  Upper Extremity Range of  Motion:     -       Right Upper Extremity: WFL except limited above 90 degrees at shoulders  -       Left Upper Extremity: WFL except limited above 90 degrees at shoulders  Upper Extremity Strength:    -       Right Upper Extremity: WFL  -       Left Upper Extremity: WFL    AMPAC 6 Click ADL:  AMPAC Total Score: 14    Treatment & Education:  OT role/POC  Education:    Patient left up in chair with all lines intact, call button in reach and caregivers present    GOALS:   Multidisciplinary Problems     Occupational Therapy Goals        Problem: Occupational Therapy Goal    Goal Priority Disciplines Outcome Interventions   Occupational Therapy Goal     OT, PT/OT     Description:  Goals to be met by: d/c     Patient will increase functional independence with ADLs by performing:    Feeding with Minimal Assistance.  UE Dressing with Minimal Assistance.  LE Dressing with Maximum Assistance.  Toileting from bedside commode with Moderate Assistance for hygiene and clothing management.   Supine to sit with Stand-by Assistance.  Toilet transfer to bedside commode with Stand-by Assistance with RW.                      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

## 2019-11-15 NOTE — PLAN OF CARE
11/15/19 1149   BEYER Message   Medicare Outpatient and Observation Notification regarding financial responsibility Given to patient/caregiver;Explained to patient/caregiver;Signed/date by patient/caregiver   Date BEYER was signed 11/15/19   Time BEYER was signed 1122

## 2019-11-16 LAB
ANION GAP SERPL CALC-SCNC: 9 MMOL/L (ref 8–16)
ANION GAP SERPL CALC-SCNC: 9 MMOL/L (ref 8–16)
BUN SERPL-MCNC: 27 MG/DL (ref 8–23)
BUN SERPL-MCNC: 27 MG/DL (ref 8–23)
CALCIUM SERPL-MCNC: 8.3 MG/DL (ref 8.7–10.5)
CALCIUM SERPL-MCNC: 8.3 MG/DL (ref 8.7–10.5)
CHLORIDE SERPL-SCNC: 105 MMOL/L (ref 95–110)
CHLORIDE SERPL-SCNC: 105 MMOL/L (ref 95–110)
CO2 SERPL-SCNC: 27 MMOL/L (ref 23–29)
CO2 SERPL-SCNC: 27 MMOL/L (ref 23–29)
CREAT SERPL-MCNC: 1 MG/DL (ref 0.5–1.4)
CREAT SERPL-MCNC: 1 MG/DL (ref 0.5–1.4)
ERYTHROCYTE [DISTWIDTH] IN BLOOD BY AUTOMATED COUNT: 13.2 % (ref 11.5–14.5)
EST. GFR  (AFRICAN AMERICAN): >60 ML/MIN/1.73 M^2
EST. GFR  (AFRICAN AMERICAN): >60 ML/MIN/1.73 M^2
EST. GFR  (NON AFRICAN AMERICAN): >60 ML/MIN/1.73 M^2
EST. GFR  (NON AFRICAN AMERICAN): >60 ML/MIN/1.73 M^2
GLUCOSE SERPL-MCNC: 109 MG/DL (ref 70–110)
GLUCOSE SERPL-MCNC: 109 MG/DL (ref 70–110)
HCT VFR BLD AUTO: 40.1 % (ref 40–54)
HGB BLD-MCNC: 13.1 G/DL (ref 14–18)
MCH RBC QN AUTO: 31 PG (ref 27–31)
MCHC RBC AUTO-ENTMCNC: 32.7 G/DL (ref 32–36)
MCV RBC AUTO: 95 FL (ref 82–98)
PLATELET # BLD AUTO: 156 K/UL (ref 150–350)
PMV BLD AUTO: 11 FL (ref 9.2–12.9)
POTASSIUM SERPL-SCNC: 3.5 MMOL/L (ref 3.5–5.1)
POTASSIUM SERPL-SCNC: 3.5 MMOL/L (ref 3.5–5.1)
RBC # BLD AUTO: 4.22 M/UL (ref 4.6–6.2)
SODIUM SERPL-SCNC: 141 MMOL/L (ref 136–145)
SODIUM SERPL-SCNC: 141 MMOL/L (ref 136–145)
WBC # BLD AUTO: 6.77 K/UL (ref 3.9–12.7)

## 2019-11-16 PROCEDURE — 80048 BASIC METABOLIC PNL TOTAL CA: CPT

## 2019-11-16 PROCEDURE — 97116 GAIT TRAINING THERAPY: CPT | Mod: 59

## 2019-11-16 PROCEDURE — 36415 COLL VENOUS BLD VENIPUNCTURE: CPT

## 2019-11-16 PROCEDURE — 85027 COMPLETE CBC AUTOMATED: CPT

## 2019-11-16 PROCEDURE — 96361 HYDRATE IV INFUSION ADD-ON: CPT | Mod: GZ

## 2019-11-16 PROCEDURE — 25000003 PHARM REV CODE 250: Performed by: INTERNAL MEDICINE

## 2019-11-16 PROCEDURE — 63600175 PHARM REV CODE 636 W HCPCS: Performed by: INTERNAL MEDICINE

## 2019-11-16 PROCEDURE — 25000003 PHARM REV CODE 250: Performed by: HOSPITALIST

## 2019-11-16 PROCEDURE — 97530 THERAPEUTIC ACTIVITIES: CPT

## 2019-11-16 PROCEDURE — G0378 HOSPITAL OBSERVATION PER HR: HCPCS

## 2019-11-16 PROCEDURE — 96376 TX/PRO/DX INJ SAME DRUG ADON: CPT

## 2019-11-16 RX ORDER — SODIUM CHLORIDE 450 MG/100ML
INJECTION, SOLUTION INTRAVENOUS CONTINUOUS
Status: DISCONTINUED | OUTPATIENT
Start: 2019-11-16 | End: 2019-11-19 | Stop reason: HOSPADM

## 2019-11-16 RX ADMIN — CARBIDOPA AND LEVODOPA 2 TABLET: 25; 100 TABLET ORAL at 02:11

## 2019-11-16 RX ADMIN — LOSARTAN POTASSIUM 50 MG: 50 TABLET, FILM COATED ORAL at 10:11

## 2019-11-16 RX ADMIN — SODIUM CHLORIDE: 0.9 INJECTION, SOLUTION INTRAVENOUS at 12:11

## 2019-11-16 RX ADMIN — CEFTRIAXONE 1 G: 1 INJECTION, SOLUTION INTRAVENOUS at 02:11

## 2019-11-16 RX ADMIN — SODIUM CHLORIDE: 0.45 INJECTION, SOLUTION INTRAVENOUS at 02:11

## 2019-11-16 RX ADMIN — CLONAZEPAM 1.5 MG: 1 TABLET ORAL at 08:11

## 2019-11-16 RX ADMIN — CARBIDOPA AND LEVODOPA 2 TABLET: 25; 100 TABLET ORAL at 10:11

## 2019-11-16 RX ADMIN — CARBIDOPA AND LEVODOPA 2 TABLET: 25; 100 TABLET ORAL at 08:11

## 2019-11-16 NOTE — SUBJECTIVE & OBJECTIVE
Interval History:  Still with generalized weakness    Review of Systems patient denies fever chills chest pain abdominal pain nausea vomiting diarrhea.  He still has generalized weakness.  Otherwise comprehensive review of systems unchanged  Objective:     Vital Signs (Most Recent):  Temp: 98 °F (36.7 °C) (11/16/19 1200)  Pulse: 95 (11/16/19 1200)  Resp: 16 (11/16/19 1200)  BP: 96/61 (11/16/19 1200)  SpO2: 95 % (11/16/19 1200) Vital Signs (24h Range):  Temp:  [97.7 °F (36.5 °C)-98.9 °F (37.2 °C)] 98 °F (36.7 °C)  Pulse:  [] 95  Resp:  [15-16] 16  SpO2:  [95 %-98 %] 95 %  BP: ()/(61-80) 96/61     Weight: 56.7 kg (125 lb)  Body mass index is 20.18 kg/m².    Intake/Output Summary (Last 24 hours) at 11/16/2019 1304  Last data filed at 11/16/2019 0600  Gross per 24 hour   Intake 1773.75 ml   Output 295 ml   Net 1478.75 ml      Physical Exam  Patient appears improved.  He is sitting in a chair caregiver is at bedside  HEENT sclerae nonicteric  Neck is supple  Lungs are clear moderate air movement  Heart is regular rate min  Abdomen soft nontender  Extremities no edema   no Jasso  Psych patient is pleasant cooperative:  Neuro patient is alert he is oriented to place and name  Moves all extremities equal  Significant Labs:   BMP:   Recent Labs   Lab 11/16/19 0617     109     141   K 3.5  3.5     105   CO2 27  27   BUN 27*  27*   CREATININE 1.0  1.0   CALCIUM 8.3*  8.3*     CBC:   Recent Labs   Lab 11/14/19 2155 11/16/19 0617   WBC 12.21 6.77   HGB 12.7* 13.1*   HCT 38.8* 40.1   * 156     CMP:   Recent Labs   Lab 11/14/19 2155 11/16/19 0617    141  141   K 3.4* 3.5  3.5    105  105   CO2 27 27  27    109  109   BUN 46* 27*  27*   CREATININE 1.4 1.0  1.0   CALCIUM 8.5* 8.3*  8.3*   PROT 6.6  --    ALBUMIN 3.3*  --    BILITOT 1.7*  --    ALKPHOS 61  --    AST 21  --    ALT 8*  --    ANIONGAP 11 9  9   EGFRNONAA 47.8* >60.0  >60.0        Significant Imaging:

## 2019-11-16 NOTE — NURSING
"Patient has made multiple attempts to climb out of bed. Patient requests foam mattress topper. Sn explained foam mattress is not available. Patient requests waffle mattress to be removed, states "I will get out of bed and lay on the floor if you do not remove mattress". Sn removed mattress, patient continues to c/o discomfort in bed. Sn repositioned, activated level 2 bed alarm.  "

## 2019-11-16 NOTE — ASSESSMENT & PLAN NOTE
Generalized weakness most likely multifactorial including volume depletion and underlying urinary tract infection  Appears improved  Continue IV fluids

## 2019-11-16 NOTE — PLAN OF CARE
Problem: Fall Injury Risk  Goal: Absence of Fall and Fall-Related Injury  Outcome: Ongoing, Progressing     Problem: Skin Injury Risk Increased  Goal: Skin Health and Integrity  Outcome: Ongoing, Progressing     Problem: Adult Inpatient Plan of Care  Goal: Optimal Comfort and Wellbeing  Outcome: Ongoing, Progressing     Problem: Adult Inpatient Plan of Care  Goal: Plan of Care Review  Outcome: Ongoing, Progressing

## 2019-11-16 NOTE — PLAN OF CARE
Problem: Physical Therapy Goal  Goal: Physical Therapy Goal  Description  Goals to be met by: 19    Patient will increase functional independence with mobility by performin. Supine to sit with Modified [place]  2. Sit to supine with Modified [place]  3. Sit to stand transfer with Modified [place]  4. Bed to chair transfer with Modified [place] using Rolling Walker  5. Gait  x 150 feet with Supervision using Rolling Walker.      Outcome: Ongoing, Progressing   Pt with slow progression today secondary to increased confusion and difficulty following commands.

## 2019-11-16 NOTE — PT/OT/SLP PROGRESS
Physical Therapy Treatment    Patient Name:  Toni Kamara   MRN:  [id number]    Recommendations:     Discharge Recommendations:  nursing facility, skilled, home health PT, home   Discharge Equipment Recommendations: none   Barriers to discharge:      Assessment:     Toni Kamara is a 79 y.o. male admitted with a medical diagnosis of Weakness generalized.  He presents with the following impairments/functional limitations:  weakness, impaired endurance, impaired self care skills, impaired functional mobilty, impaired cognition, decreased safety awareness, impaired coordination, decreased lower extremity function, decreased upper extremity function, impaired balance, gait instability. Pt required increased assistance during tx secondary to confusion and difficulty following commands. Pt required mod A to maintain upright position secondary to posterior/right sided lean. Pt with posterior lean once standing with RW requiring mod A to maintain upright position. Pt with increased difficulty initiating steps with BLE requiring max verbal cuing during stand transfer to chair. Pt with small shuffling steps during ambulation.  Continue with PT and POC.     Rehab Prognosis: Fair; patient would benefit from acute skilled PT services to address these deficits and reach maximum level of function.    Recent Surgery: * No surgery found *      Plan:     During this hospitalization, patient to be seen 6 x/week to address the identified rehab impairments via gait training, therapeutic activities, therapeutic exercises and progress toward the following goals:    · Plan of Care Expires:  12/13/19    Subjective     Chief Complaint: Pt with reports of difficulty sleeping last night.   Patient/Family Comments/goals: return home   Pain/Comfort:  · Pain Rating 1: 0/10      Objective:     Communicated with RN prior to session.  Patient found HOB elevated with bed alarm, telemetry upon PT entry to room.     General Precautions: Standard, fall    Orthopedic Precautions:    Braces:       Functional Mobility:  · Bed Mobility:     · Supine to Sit: moderate assistance  · Transfers:     · Sit to Stand:  moderate assistance with rolling walker  · Bed to Chair: moderate assistance with  rolling walker  using  Step Transfer  · Gait: 5 feet to chair with RW and mod A and verbal cuing for walker management and  bilateral foot placement       AM-PAC 6 CLICK MOBILITY          Therapeutic Activities and Exercises:   bed mobility; sitting EOB for trunk control and midline orientation; sit<> stands; transfer training; gait training     Patient left up in chair with all lines intact, call button in reach, chair alarm on and caretaker present..    GOALS:   Multidisciplinary Problems     Physical Therapy Goals        Problem: Physical Therapy Goal    Goal Priority Disciplines Outcome Goal Variances Interventions   Physical Therapy Goal     PT, PT/OT Ongoing, Progressing     Description:  Goals to be met by: 19    Patient will increase functional independence with mobility by performin. Supine to sit with Modified [place]  2. Sit to supine with Modified [place]  3. Sit to stand transfer with Modified [place]  4. Bed to chair transfer with Modified [place] using Rolling Walker  5. Gait  x 150 feet with Supervision using Rolling Walker.                       Time Tracking:     PT Received On: 19  PT Start Time: 1041     PT Stop Time: 1106  PT Total Time (min): 25 min     Billable Minutes: Gait Training 10 and Therapeutic Activity 15    Treatment Type: Treatment  PT/PTA: PTA     PTA Visit Number: 1 Hillary Hammant, PTA  2019

## 2019-11-17 PROBLEM — R41.3 MEMORY CHANGES: Status: ACTIVE | Noted: 2019-11-17

## 2019-11-17 LAB
ANION GAP SERPL CALC-SCNC: 9 MMOL/L (ref 8–16)
BUN SERPL-MCNC: 22 MG/DL (ref 8–23)
CALCIUM SERPL-MCNC: 8.1 MG/DL (ref 8.7–10.5)
CHLORIDE SERPL-SCNC: 102 MMOL/L (ref 95–110)
CO2 SERPL-SCNC: 28 MMOL/L (ref 23–29)
CREAT SERPL-MCNC: 1.2 MG/DL (ref 0.5–1.4)
EST. GFR  (AFRICAN AMERICAN): >60 ML/MIN/1.73 M^2
EST. GFR  (NON AFRICAN AMERICAN): 57.6 ML/MIN/1.73 M^2
GLUCOSE SERPL-MCNC: 117 MG/DL (ref 70–110)
POTASSIUM SERPL-SCNC: 3.4 MMOL/L (ref 3.5–5.1)
SODIUM SERPL-SCNC: 139 MMOL/L (ref 136–145)

## 2019-11-17 PROCEDURE — 96361 HYDRATE IV INFUSION ADD-ON: CPT | Mod: GZ

## 2019-11-17 PROCEDURE — 63600175 PHARM REV CODE 636 W HCPCS: Performed by: INTERNAL MEDICINE

## 2019-11-17 PROCEDURE — 96376 TX/PRO/DX INJ SAME DRUG ADON: CPT

## 2019-11-17 PROCEDURE — 80048 BASIC METABOLIC PNL TOTAL CA: CPT

## 2019-11-17 PROCEDURE — 25000003 PHARM REV CODE 250: Performed by: INTERNAL MEDICINE

## 2019-11-17 PROCEDURE — 36415 COLL VENOUS BLD VENIPUNCTURE: CPT

## 2019-11-17 PROCEDURE — 25000003 PHARM REV CODE 250: Performed by: HOSPITALIST

## 2019-11-17 PROCEDURE — G0378 HOSPITAL OBSERVATION PER HR: HCPCS

## 2019-11-17 RX ORDER — HYDROCODONE BITARTRATE AND ACETAMINOPHEN 7.5; 325 MG/1; MG/1
1 TABLET ORAL EVERY 6 HOURS PRN
Status: DISCONTINUED | OUTPATIENT
Start: 2019-11-17 | End: 2019-11-19 | Stop reason: HOSPADM

## 2019-11-17 RX ORDER — CEFUROXIME AXETIL 250 MG/1
250 TABLET ORAL 2 TIMES DAILY
Status: DISCONTINUED | OUTPATIENT
Start: 2019-11-17 | End: 2019-11-19 | Stop reason: HOSPADM

## 2019-11-17 RX ORDER — CLONAZEPAM 0.5 MG/1
0.5 TABLET ORAL NIGHTLY
Status: DISCONTINUED | OUTPATIENT
Start: 2019-11-17 | End: 2019-11-19 | Stop reason: HOSPADM

## 2019-11-17 RX ADMIN — CLONAZEPAM 0.5 MG: 0.5 TABLET ORAL at 08:11

## 2019-11-17 RX ADMIN — CEFUROXIME AXETIL 250 MG: 250 TABLET, FILM COATED ORAL at 09:11

## 2019-11-17 RX ADMIN — HYDROCODONE BITARTRATE AND ACETAMINOPHEN 1 TABLET: 7.5; 325 TABLET ORAL at 09:11

## 2019-11-17 RX ADMIN — CEFTRIAXONE 1 G: 1 INJECTION, SOLUTION INTRAVENOUS at 02:11

## 2019-11-17 RX ADMIN — SODIUM CHLORIDE: 0.45 INJECTION, SOLUTION INTRAVENOUS at 05:11

## 2019-11-17 RX ADMIN — CARBIDOPA AND LEVODOPA 2 TABLET: 25; 100 TABLET ORAL at 09:11

## 2019-11-17 NOTE — ASSESSMENT & PLAN NOTE
CT head  IMPRESSION:    1.  No acute intracranial abnormality observed.  2.  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  3. Mild diffuse brain parenchymal atrophy changes.    Patient with some episodes confusion during this hospitalization.  Daughters are at bedside.  I discussed case with patient and his daughters.  Patient may need help with paying his  bills and other affairs.  Daughters have agreed to assist

## 2019-11-17 NOTE — PLAN OF CARE
Pt refused air mattress. Repositioned every 2 hours and as needed.  Bed alarm on. Pt room close to nurses station.

## 2019-11-17 NOTE — ASSESSMENT & PLAN NOTE
Generalized weakness most likely multifactorial including volume depletion and underlying urinary tract infection  Appears improved  Continue IV fluids  Physical therapy  Patient does not want to go to a skilled nursing facility.  He does have caregivers at home.  Will try to arrange home physical therapy possible discharge tomorrow  I have also informed daughters that patient will need 24 hr care at home

## 2019-11-17 NOTE — SUBJECTIVE & OBJECTIVE
Interval History:  Intermittent confusion as per daughters    Review of Systems patient denies fever chills chest pain shortness of breath abdominal pain nausea vomiting diarrhea or urinary complaints.  Patient denies neurological complaints other than his Parkinson's.  He does status memory is less  Objective:     Vital Signs (Most Recent):  Temp: 99 °F (37.2 °C) (11/17/19 1158)  Pulse: (!) 50 (11/17/19 1158)  Resp: 18 (11/17/19 0724)  BP: 129/75 (11/17/19 1158)  SpO2: 95 % (11/17/19 0724) Vital Signs (24h Range):  Temp:  [97.6 °F (36.4 °C)-99 °F (37.2 °C)] 99 °F (37.2 °C)  Pulse:  [50-85] 50  Resp:  [16-18] 18  SpO2:  [95 %-100 %] 95 %  BP: (129-164)/(64-79) 129/75     Weight: 56.7 kg (125 lb)  Body mass index is 20.18 kg/m².    Intake/Output Summary (Last 24 hours) at 11/17/2019 1249  Last data filed at 11/17/2019 0400  Gross per 24 hour   Intake 875 ml   Output --   Net 875 ml      Physical Exam patient appears frail sitting in bed daughters are at bedside  HEENT sclerae nonicteric  Neck is supple nontender  Lungs are clear but coarse breath sounds  Heart is regular and rhythm  Abdomen is soft nontender  Extremities no edema  Neuro patient is alert he is oriented to my name place and daughters  Moves all extremities equally, resting tremor   exam no Jasso      Significant Labs:   BMP:   Recent Labs   Lab 11/16/19 0617     109     141   K 3.5  3.5     105   CO2 27  27   BUN 27*  27*   CREATININE 1.0  1.0   CALCIUM 8.3*  8.3*     CBC:   Recent Labs   Lab 11/16/19 0617   WBC 6.77   HGB 13.1*   HCT 40.1        CMP:   Recent Labs   Lab 11/16/19 0617     141   K 3.5  3.5     105   CO2 27  27     109   BUN 27*  27*   CREATININE 1.0  1.0   CALCIUM 8.3*  8.3*   ANIONGAP 9  9   EGFRNONAA >60.0  >60.0       Significant Imaging:  Head CT  IMPRESSION:    1.  No acute intracranial abnormality observed.  2.  Old lacunar infarcts in bilateral basal ganglia  region.  3. Mild diffuse brain parenchymal atrophy changes.

## 2019-11-17 NOTE — PROGRESS NOTES
"[facility] Medicine  Progress Note    Patient Name: Toni Kamara  MRN: [id number]  Patient Class: OP- Observation   Admission Date: 11/14/2019  Length of Stay: 0 days  Attending Physician: Ventura Holder DO  Primary Care Provider: Harinder Stout MD        Subjective:     Principal Problem:Weakness generalized        HPI:  79 year male brought to ED for weakness, fatigue and falling at home. "Falling" = twice in past 3 days the patient slid off of his bed and onto the floor because he was too weak to hold himself up. No LOC. Did not strike head. No LOP/LOM. Suffers Parkinsonism. No CP/SOB. No history of orthopnea/PND. Laboratory analysis reveals pre-renal azotemia with dirty urine.  CT Head = no acute pathology. CXR = [facility]. EKG = no acute ischemic changes. Patient states he does NOT wish to be resuscitated in the event of cardio-pulomonary arrest    Overview/Hospital Course:  No notes on file    Interval History:  Intermittent confusion as per daughters    Review of Systems patient denies fever chills chest pain shortness of breath abdominal pain nausea vomiting diarrhea or urinary complaints.  Patient denies neurological complaints other than his Parkinson's.  He does status memory is less  Objective:     Vital Signs (Most Recent):  Temp: 99 °F (37.2 °C) (11/17/19 1158)  Pulse: (!) 50 (11/17/19 1158)  Resp: 18 (11/17/19 0724)  BP: 129/75 (11/17/19 1158)  SpO2: 95 % (11/17/19 0724) Vital Signs (24h Range):  Temp:  [97.6 °F (36.4 °C)-99 °F (37.2 °C)] 99 °F (37.2 °C)  Pulse:  [50-85] 50  Resp:  [16-18] 18  SpO2:  [95 %-100 %] 95 %  BP: (129-164)/(64-79) 129/75     Weight: 56.7 kg (125 lb)  Body mass index is 20.18 kg/m².    Intake/Output Summary (Last 24 hours) at 11/17/2019 1249  Last data filed at 11/17/2019 0400  Gross per 24 hour   Intake 875 ml   Output --   Net 875 ml      Physical Exam patient appears frail sitting in bed daughters are at bedside  HEENT sclerae nonicteric  Neck is supple " nontender  Lungs are clear but coarse breath sounds  Heart is regular and rhythm  Abdomen is soft nontender  Extremities no edema  Neuro patient is alert he is oriented to my name place and daughters  Moves all extremities equally, resting tremor   exam no Jasso      Significant Labs:   BMP:   Recent Labs   Lab 11/16/19 0617     109     141   K 3.5  3.5     105   CO2 27  27   BUN 27*  27*   CREATININE 1.0  1.0   CALCIUM 8.3*  8.3*     CBC:   Recent Labs   Lab 11/16/19 0617   WBC 6.77   HGB 13.1*   HCT 40.1        CMP:   Recent Labs   Lab 11/16/19 0617     141   K 3.5  3.5     105   CO2 27  27     109   BUN 27*  27*   CREATININE 1.0  1.0   CALCIUM 8.3*  8.3*   ANIONGAP 9  9   EGFRNONAA >60.0  >60.0       Significant Imaging:  Head CT  IMPRESSION:    1.  No acute intracranial abnormality observed.  2.  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  3. Mild diffuse brain parenchymal atrophy changes.      Assessment/Plan:      * Weakness generalized  Generalized weakness most likely multifactorial including volume depletion and underlying urinary tract infection  Appears improved  Continue IV fluids  Physical therapy  Patient does not want to go to a skilled nursing facility.  He does have caregivers at home.  Will try to arrange home physical therapy possible discharge tomorrow  I have also informed daughters that patient will need 24 hr care at home    UTI (urinary tract infection)  Acute UTI present on admission   urine culture was not done  Changed to p.o. antibiotics        Prerenal azotemia  Resolved      Hypokalemia  Will replace use sliding scale  Check labs in a.m.      Memory changes  CT head  IMPRESSION:    1.  No acute intracranial abnormality observed.  2.  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  3. Mild diffuse brain parenchymal atrophy changes.    Patient with some episodes confusion during this hospitalization.   Daughters are at bedside.  I discussed case with patient and his daughters.  Patient may need help with paying his  bills and other affairs.  Daughters have agreed to assist    VTE Risk Mitigation (From admission, onward)         Ordered     IP VTE HIGH RISK PATIENT  Once      11/15/19 0452              I had a lengthy discussion with patient and his daughters at bedside.  Patient wishes to be do not resuscitate.  I informed patient that he should not drive anymore  Patient will need 24 hr care at this point  He wishes to return home with his present caregivers  Will try to arrange outpatient physical therapy and home health to assist  Daughters have good understanding of patien.s  overall prognosis and plan as outlined  Patient's wife of 52-years passed away this April      Ventura Holder DO  Department of Hospital Medicine   [facility]

## 2019-11-18 LAB
ANION GAP SERPL CALC-SCNC: 8 MMOL/L (ref 8–16)
BUN SERPL-MCNC: 25 MG/DL (ref 8–23)
CALCIUM SERPL-MCNC: 8.3 MG/DL (ref 8.7–10.5)
CHLORIDE SERPL-SCNC: 106 MMOL/L (ref 95–110)
CO2 SERPL-SCNC: 27 MMOL/L (ref 23–29)
CREAT SERPL-MCNC: 1.1 MG/DL (ref 0.5–1.4)
EST. GFR  (AFRICAN AMERICAN): >60 ML/MIN/1.73 M^2
EST. GFR  (NON AFRICAN AMERICAN): >60 ML/MIN/1.73 M^2
GLUCOSE SERPL-MCNC: 105 MG/DL (ref 70–110)
POTASSIUM SERPL-SCNC: 3.3 MMOL/L (ref 3.5–5.1)
SODIUM SERPL-SCNC: 141 MMOL/L (ref 136–145)

## 2019-11-18 PROCEDURE — 36415 COLL VENOUS BLD VENIPUNCTURE: CPT

## 2019-11-18 PROCEDURE — 97116 GAIT TRAINING THERAPY: CPT

## 2019-11-18 PROCEDURE — 80048 BASIC METABOLIC PNL TOTAL CA: CPT

## 2019-11-18 PROCEDURE — G0378 HOSPITAL OBSERVATION PER HR: HCPCS

## 2019-11-18 PROCEDURE — 97530 THERAPEUTIC ACTIVITIES: CPT

## 2019-11-18 PROCEDURE — 25000003 PHARM REV CODE 250: Performed by: INTERNAL MEDICINE

## 2019-11-18 PROCEDURE — 97535 SELF CARE MNGMENT TRAINING: CPT

## 2019-11-18 PROCEDURE — 25000003 PHARM REV CODE 250: Performed by: HOSPITALIST

## 2019-11-18 RX ADMIN — CEFUROXIME AXETIL 250 MG: 250 TABLET, FILM COATED ORAL at 10:11

## 2019-11-18 RX ADMIN — CARBIDOPA AND LEVODOPA 2 TABLET: 25; 100 TABLET ORAL at 04:11

## 2019-11-18 RX ADMIN — LOSARTAN POTASSIUM 50 MG: 50 TABLET, FILM COATED ORAL at 10:11

## 2019-11-18 RX ADMIN — CARBIDOPA AND LEVODOPA 2 TABLET: 25; 100 TABLET ORAL at 08:11

## 2019-11-18 RX ADMIN — CARBIDOPA AND LEVODOPA 2 TABLET: 25; 100 TABLET ORAL at 10:11

## 2019-11-18 RX ADMIN — CLONAZEPAM 0.5 MG: 0.5 TABLET ORAL at 08:11

## 2019-11-18 RX ADMIN — CEFUROXIME AXETIL 250 MG: 250 TABLET, FILM COATED ORAL at 08:11

## 2019-11-18 NOTE — PLAN OF CARE
Problem: Physical Therapy Goal  Goal: Physical Therapy Goal  Description  Goals to be met by: 19    Patient will increase functional independence with mobility by performin. Supine to sit with Modified [place]  2. Sit to supine with Modified [place]  3. Sit to stand transfer with Modified [place]  4. Bed to chair transfer with Modified [place] using Rolling Walker  5. Gait  x 150 feet with Supervision using Rolling Walker.      Outcome: Ongoing, Progressing     Continue skilled PT to allow progression towards established PT goals.

## 2019-11-18 NOTE — PLAN OF CARE
Problem: Skin Injury Risk Increased  Goal: Skin Health and Integrity  Outcome: Ongoing, Progressing     Problem: Oral Intake Inadequate  Goal: Improved Oral Intake  Outcome: Ongoing, Progressing  Intervention: Promote and Optimize Oral Intake  Flowsheets (Taken 11/18/2019 1013)  Oral Nutrition Promotion: calorie dense liquids provided       Recommendation/Intervention:   1.) Add milkshake + benecal BID to aid PO intake (1420 kcal, 38 g pro)   2.) Provide meal assist as needed all meals   3.)  to assist with meal choices daily    Goals: 1.) Pt to meet >75% estimated needs via PO diet/shakes

## 2019-11-18 NOTE — PT/OT/SLP PROGRESS
Occupational Therapy   Treatment    Name: Toni Kamara  MRN: [id number]  Admitting Diagnosis:  Weakness generalized       Recommendations:     Discharge Recommendations: home with home health  Discharge Equipment Recommendations:  none  Barriers to discharge:       Assessment:     Toni Kamara is a 79 y.o. male with a medical diagnosis of Weakness generalized.  Pt agreeable to OT therapy session. Performance deficits affecting function are weakness, impaired endurance, impaired self care skills, gait instability, impaired balance, impaired functional mobilty, decreased safety awareness. Pt transferred to [facility] and able to clean self with Min-Mod A for balance only.     Rehab Prognosis:  Fair; patient would benefit from acute skilled OT services to address these deficits and reach maximum level of function.       Plan:     Patient to be seen 3 x/week to address the above listed problems via self-care/home management, therapeutic activities, therapeutic exercises  · Plan of Care Expires: 12/15/19  · Plan of Care Reviewed with: patient    Subjective     Pain/Comfort:  · Pain Rating 1: 0/10    Objective:     Communicated with: nursing prior to session.  Patient found HOB elevated with telemetry upon OT entry to room.    General Precautions: Standard, fall   Orthopedic Precautions:N/A   Braces: N/A     Occupational Performance:     Bed Mobility:    · Patient completed Rolling/Turning to Left with  minimum assistance  · Patient completed Rolling/Turning to Right with minimum assistance  · Patient completed Scooting/Bridging with maximal assistance  · Patient completed Supine to Sit with maximal assistance and 2 persons  · Patient completed Sit to Supine with minimum assistance     Functional Mobility/Transfers:  · Patient completed Sit <> Stand Transfer with moderate assistance  with  rolling walker   · Patient completed Toilet Transfer Stand Pivot technique with moderate assistance with  rolling walker to [facility]    Activities  of Daily Living:  · Grooming: moderate assistance for balance while washing hands  · Lower Body Dressing: total assistance to don/doff brief  · Toileting: moderate assistance for balance; patient able to clean self Mod I (increased time)    Treatment & Education:  Pt educated on safety during transfers, safety during ADLs and safety during functional mobility; Pt educated on importance of OOB activity to negate the effects of prolonged bed rest.     Patient left HOB elevated with all lines intact, call button in reach and bed alarm onEducation:      GOALS:   Multidisciplinary Problems     Occupational Therapy Goals        Problem: Occupational Therapy Goal    Goal Priority Disciplines Outcome Interventions   Occupational Therapy Goal     OT, PT/OT Ongoing, Progressing    Description:  Goals to be met by: d/c     Patient will increase functional independence with ADLs by performing:    Feeding with Minimal Assistance.  UE Dressing with Minimal Assistance.  LE Dressing with Maximum Assistance.  Toileting from bedside commode with Moderate Assistance for hygiene and clothing management.   Supine to sit with Stand-by Assistance.  Toilet transfer to bedside commode with Stand-by Assistance with RW.                      Time Tracking:     OT Date of Treatment: 11/18/19  OT Start Time: 1351  OT Stop Time: 1430  OT Total Time (min): 39 min    Billable Minutes:Self Care/Home Management 29  Therapeutic Activity 10    Susan Weiss OT  11/18/2019

## 2019-11-18 NOTE — PLAN OF CARE
Problem: Fall Injury Risk  Goal: Absence of Fall and Fall-Related Injury  Outcome: Ongoing, Progressing     Problem: Adult Inpatient Plan of Care  Goal: Plan of Care Review  Outcome: Ongoing, Progressing  Goal: Patient-Specific Goal (Individualization)  Outcome: Ongoing, Progressing  Goal: Absence of Hospital-Acquired Illness or Injury  Outcome: Ongoing, Progressing  Goal: Optimal Comfort and Wellbeing  Outcome: Ongoing, Progressing  Goal: Readiness for Transition of Care  Outcome: Ongoing, Progressing  Goal: Rounds/Family Conference  Outcome: Ongoing, Progressing     Problem: Skin Injury Risk Increased  Goal: Skin Health and Integrity  Outcome: Ongoing, Progressing     Problem: Oral Intake Inadequate  Goal: Improved Oral Intake  Outcome: Ongoing, Progressing

## 2019-11-18 NOTE — PLAN OF CARE
Patient sat on the side of the bed ate about 8-10 percent of his dinner last night he put lotion on his legs and arms used his mouthwash  he has only compained of pain once during the night he is able to repostion himself all safety measures followed throughout the shift

## 2019-11-18 NOTE — PT/OT/SLP PROGRESS
Physical Therapy Treatment    Patient Name:  Toni Kamara   MRN:  [id number]    Recommendations:     Discharge Recommendations:  home health PT ; 24 hour care  Discharge Equipment Recommendations: none   Barriers to discharge: None    Assessment:     Toni Kamara is a 79 y.o. male admitted with a medical diagnosis of Weakness generalized.  He presents with the following impairments/functional limitations:  weakness, gait instability, impaired endurance, impaired balance, impaired functional mobilty, decreased safety awareness. Pt requires ModA for transfers. He ambulated 250' with RW and CGA in hallway. He has significant kyphosis and cannot stand erect. Pt does not want SNF. His functional mobility with PT treatment today is appropriate for D/C home with HHPT.     Rehab Prognosis: Fair; patient would benefit from acute skilled PT services to address these deficits and reach maximum level of function.    Recent Surgery: * No surgery found *      Plan:     During this hospitalization, patient to be seen 6 x/week to address the identified rehab impairments via gait training, therapeutic activities, therapeutic exercises and progress toward the following goals:    · Plan of Care Expires:  12/13/19    Subjective     Chief Complaint: none  Patient/Family Comments/goals: home  Pain/Comfort:  · Pain Rating 1: 0/10      Objective:     Communicated with RN prior to session.  Patient found HOB elevated with telemetry upon PT entry to room.     General Precautions: Standard, fall   Orthopedic Precautions:N/A   Braces: N/A     Functional Mobility:  · Bed Mobility:     · Supine to Sit: moderate assistance  · Transfers:     · Sit to Stand:  moderate assistance with rolling walker  · Gait: 250' RW CGA; kyphotic  · Balance: fair      AM-PAC 6 CLICK MOBILITY          Therapeutic Activities and Exercises:   Patient was educated on the importance of OOB activity during hospital stay, safe transfers and ambulation     Assisted pt with  brushing teeth in bed    Patient left HOB elevated with call button in reach and RN notified..    GOALS:   Multidisciplinary Problems     Physical Therapy Goals        Problem: Physical Therapy Goal    Goal Priority Disciplines Outcome Goal Variances Interventions   Physical Therapy Goal     PT, PT/OT Ongoing, Progressing     Description:  Goals to be met by: 19    Patient will increase functional independence with mobility by performin. Supine to sit with Modified [place]  2. Sit to supine with Modified [place]  3. Sit to stand transfer with Modified [place]  4. Bed to chair transfer with Modified [place] using Rolling Walker  5. Gait  x 150 feet with Supervision using Rolling Walker.                       Time Tracking:     PT Received On: 19  PT Start Time: 938     PT Stop Time: 1012  PT Total Time (min): 34 min     Billable Minutes: Gait Training 15 and Therapeutic Activity 19    Treatment Type: Treatment  PT/PTA: PT     PTA Visit Number: 0     Letty Reese, PT  2019

## 2019-11-18 NOTE — PROGRESS NOTES
"[facility] Medicine  Progress Note    Patient Name: Toni Kamara  MRN: [id number]  Patient Class: OP- Observation   Admission Date: 11/14/2019  Length of Stay: 0 days  Attending Physician: Ventura Holder DO  Primary Care Provider: Harinder Stout MD        Subjective:     Principal Problem:Weakness generalized        HPI:  79 year male brought to ED for weakness, fatigue and falling at home. "Falling" = twice in past 3 days the patient slid off of his bed and onto the floor because he was too weak to hold himself up. No LOC. Did not strike head. No LOP/LOM. Suffers Parkinsonism. No CP/SOB. No history of orthopnea/PND. Laboratory analysis reveals pre-renal azotemia with dirty urine.  CT Head = no acute pathology. CXR = NAPD. EKG = no acute ischemic changes. Patient states he does NOT wish to be resuscitated in the event of cardio-pulomonary arrest    Overview/Hospital Course:  No notes on file    Interval History:  Weakness has improved    Review of Systems patient denies fever chills chest pain shortness of breath abdominal pain nausea vomiting.  Generalized weakness better  Otherwise comprehensive review of systems are unchanged  Objective:     Vital Signs (Most Recent):  Temp: 98.1 °F (36.7 °C) (11/18/19 1245)  Pulse: 82 (11/18/19 1245)  Resp: 16 (11/18/19 1245)  BP: 110/72 (11/18/19 1245)  SpO2: 95 % (11/18/19 1245) Vital Signs (24h Range):  Temp:  [97.8 °F (36.6 °C)-98.1 °F (36.7 °C)] 98.1 °F (36.7 °C)  Pulse:  [74-83] 82  Resp:  [16-18] 16  SpO2:  [95 %-96 %] 95 %  BP: (110-162)/(72-83) 110/72     Weight: 56.7 kg (125 lb)  Body mass index is 20.18 kg/m².    Intake/Output Summary (Last 24 hours) at 11/18/2019 1618  Last data filed at 11/17/2019 1800  Gross per 24 hour   Intake 1050 ml   Output --   Net 1050 ml      Physical Exam patient appears better is ambulating in the [place] with physical therapy  HEENT sclerae nonicteric  Neck is supple nontender  Lungs are clear  Heart is " regular  Abdomen is soft  Extremities no edema  Neuro patient is alert he is oriented x3   exam no Jasso  Psych patient is pleasant cooperative    Significant Labs:   BMP:   Recent Labs   Lab 11/18/19  0611         K 3.3*      CO2 27   BUN 25*   CREATININE 1.1   CALCIUM 8.3*     CBC: No results for input(s): WBC, HGB, HCT, PLT in the last 48 hours.  CMP:   Recent Labs   Lab 11/17/19  1651 11/18/19  0611    141   K 3.4* 3.3*    106   CO2 28 27   * 105   BUN 22 25*   CREATININE 1.2 1.1   CALCIUM 8.1* 8.3*   ANIONGAP 9 8   EGFRNONAA 57.6* >60.0           Assessment/Plan:      * Weakness generalized  Generalized weakness most likely multifactorial including volume depletion and underlying urinary tract infection  Appears improved  Continue IV fluids  Physical therapy  Patient does not want to go to a skilled nursing facility.  He does have caregivers at home.  Will try to arrange home physical therapy possible discharge tomorrow  I have also informed daughters that patient will need 24 hr care at home    UTI (urinary tract infection)  Acute UTI present on admission   urine culture was not done  Changed to p.o. antibiotics        Prerenal azotemia  Resolved      Hypokalemia  Will replace use sliding scale  Check labs in a.m.      Memory changes  CT head  IMPRESSION:    1.  No acute intracranial abnormality observed.  2.  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  3. Mild diffuse brain parenchymal atrophy changes.    Patient with some episodes confusion during this hospitalization.  Daughters are at bedside.  I discussed case with patient and his daughters.  Patient may need help with paying his  bills and other affairs.  Daughters have agreed to assist      VTE Risk Mitigation (From admission, onward)         Ordered     IP VTE HIGH RISK PATIENT  Once      11/15/19 0452              Family and patient do not want to use skilled nursing facility.  Patient has help at  home.  Will just need to make sure that he has 24 hr care which is currently trying to be arranged at this time  Hopefully home in the a.m.  Will probably need home physical therapy, and home health      Ventura Holder DO  Department of Hospital Medicine   [facility]

## 2019-11-18 NOTE — SUBJECTIVE & OBJECTIVE
Interval History:  Weakness has improved    Review of Systems patient denies fever chills chest pain shortness of breath abdominal pain nausea vomiting.  Generalized weakness better  Otherwise comprehensive review of systems are unchanged  Objective:     Vital Signs (Most Recent):  Temp: 98.1 °F (36.7 °C) (11/18/19 1245)  Pulse: 82 (11/18/19 1245)  Resp: 16 (11/18/19 1245)  BP: 110/72 (11/18/19 1245)  SpO2: 95 % (11/18/19 1245) Vital Signs (24h Range):  Temp:  [97.8 °F (36.6 °C)-98.1 °F (36.7 °C)] 98.1 °F (36.7 °C)  Pulse:  [74-83] 82  Resp:  [16-18] 16  SpO2:  [95 %-96 %] 95 %  BP: (110-162)/(72-83) 110/72     Weight: 56.7 kg (125 lb)  Body mass index is 20.18 kg/m².    Intake/Output Summary (Last 24 hours) at 11/18/2019 1618  Last data filed at 11/17/2019 1800  Gross per 24 hour   Intake 1050 ml   Output --   Net 1050 ml      Physical Exam patient appears better is ambulating in the [place] with physical therapy  HEENT sclerae nonicteric  Neck is supple nontender  Lungs are clear  Heart is regular  Abdomen is soft  Extremities no edema  Neuro patient is alert he is oriented x3   exam no Jasso  Psych patient is pleasant cooperative    Significant Labs:   BMP:   Recent Labs   Lab 11/18/19  0611         K 3.3*      CO2 27   BUN 25*   CREATININE 1.1   CALCIUM 8.3*     CBC: No results for input(s): WBC, HGB, HCT, PLT in the last 48 hours.  CMP:   Recent Labs   Lab 11/17/19  1651 11/18/19  0611    141   K 3.4* 3.3*    106   CO2 28 27   * 105   BUN 22 25*   CREATININE 1.2 1.1   CALCIUM 8.1* 8.3*   ANIONGAP 9 8   EGFRNONAA 57.6* >60.0

## 2019-11-18 NOTE — PROGRESS NOTES
"[facility]  Adult Nutrition   Progress Note (Initial Assessment)     SUMMARY     Recommendations/Interventions:    Recommendation/Intervention: 1.) Add milkshake + benecal BID to aid PO intake (1420 kcal, 38 g pro) 2.) Provide meal assist as needed all meals 3.)  to assist with meal choices daily  Goals: 1.) Pt to meet >75% estimated needs via PO diet/shakes  Nutrition Goal Status: progressing towards goal    Reason for Assessment    Reason For Assessment: length of stay  Diagnosis: other (see comments)(weakness)  Relevant Medical History: Parkinsons dz, anxiety, BPH    Nutrition Risk Screen    Nutrition Risk Screen: no indicators present    Nutrition/Diet History    Patient Reported Diet/Restrictions/Preferences: general  Food Allergies: NKFA  Factors Affecting Nutritional Intake: decreased appetite    Anthropometrics    Temp: 97.8 °F (36.6 °C)  Height: 5' 6" (167.6 cm)  Height (inches): 66 in  Weight Method: Bed Scale  Weight: 56.7 kg (125 lb)  Weight (lb): 125 lb  Ideal Body Weight (IBW), Male: 142 lb  % Ideal Body Weight, Male (lb): 88.03 lb  BMI (Calculated): 20.2  BMI Grade: 18.5-24.9 - normal       Weight History:  Wt Readings from Last 10 Encounters:   11/18/19 56.7 kg (125 lb)   11/05/19 51.7 kg (114 lb)   10/14/19 51.9 kg (114 lb 8.5 oz)   09/24/19 54.4 kg (120 lb)   08/27/19 54.4 kg (120 lb)   08/14/19 54.9 kg (121 lb)   08/13/19 55 kg (121 lb 4.1 oz)   05/16/19 55.8 kg (123 lb 0.3 oz)   01/31/19 59.1 kg (130 lb 4.7 oz)   09/17/18 53 kg (116 lb 13.5 oz)       Lab/Procedures/Meds: Pertinent Labs Reviewed  Clinical Chemistry:  Recent Labs   Lab 11/14/19  2155  11/18/19  0611      < > 141   K 3.4*   < > 3.3*      < > 106   CO2 27   < > 27      < > 105   BUN 46*   < > 25*   CREATININE 1.4   < > 1.1   CALCIUM 8.5*   < > 8.3*   PROT 6.6  --   --    ALBUMIN 3.3*  --   --    BILITOT 1.7*  --   --    ALKPHOS 61  --   --    AST 21  --   --    ALT 8*  --   --    ANIONGAP 11   " < > 8   ESTGFRAFRICA 55.2*   < > >60.0   EGFRNONAA 47.8*   < > >60.0    < > = values in this interval not displayed.     CBC:   Recent Labs   Lab 11/16/19  0617   WBC 6.77   RBC 4.22*   HGB 13.1*   HCT 40.1      MCV 95   MCH 31.0   MCHC 32.7     Cardiac Profile:  Recent Labs   Lab 11/14/19  2115 11/14/19  2155   BNP  --  415*   *  --    TROPONINI  --  <0.030       Medications: Pertinent Medications reviewed  Scheduled Meds:   carbidopa-levodopa  mg  2 tablet Oral TID    cefUROXime  250 mg Oral BID    clonazePAM  0.5 mg Oral QHS    losartan  50 mg Oral Daily     Continuous Infusions:   sodium chloride 0.45% Stopped (11/17/19 1800)     PRN Meds:.calcium chloride IVPB, calcium chloride IVPB, calcium chloride IVPB, HYDROcodone-acetaminophen, magnesium oxide, magnesium sulfate IVPB, magnesium sulfate IVPB, magnesium sulfate IVPB, magnesium sulfate IVPB, potassium chloride in water, potassium chloride in water, potassium chloride in water, potassium chloride in water, potassium chloride, potassium chloride, potassium chloride, potassium chloride, sodium phosphate IVPB, sodium phosphate IVPB, sodium phosphate IVPB, sodium phosphate IVPB, sodium phosphate IVPB    Estimated/Assessed Needs    Weight Used For Calorie Calculations: 56.7 kg (125 lb)  Energy Calorie Requirements (kcal): 9109-3165 (30-35 kcal/kg)  Energy Need Method: Kcal/kg  Protein Requirements: 68-85 g (1.2-1.5 g/kg)  Weight Used For Protein Calculations: 56.7 kg (125 lb)     Estimated Fluid Requirement Method: RDA Method    Nutrition Prescription Ordered    Current Diet Order: cardiac    Evaluation of Received Nutrient/Fluid Intake    Energy Calories Required: not meeting needs  Protein Required: not meeting needs  Fluid Required: meeting needs  Tolerance: tolerating    % Meal Intake: 25 - 50 %    Intake/Output Summary (Last 24 hours) at 11/18/2019 1004  Last data filed at 11/17/2019 1800  Gross per 24 hour   Intake 1050 ml   Output --    Net 1050 ml      Nutrition Risk    Level of Risk/Frequency of Follow-up: high     Dietitian Rounds Brief:    Pt assessed by RD 2' LOS. Noted pt refusing SNF--wants to go home. Pt s/p fall PTA. Tolerating diet per pt; states intake b/t 25-50% meals. Denies chew/swallow difficulties. Does require assist with meal setup due to Parkinson's. No N/V. Offered supplement, pt declined. Agreeable to milkshake to aid PO. NFPE performed--results noted below, insufficient data to dx malnutrition at this time.    Monitor and Evaluation    Food and Nutrient Intake: food and beverage intake, energy intake  Food and Nutrient Adminstration: diet order  Physical Activity and Function: nutrition-related ADLs and IADLs  Anthropometric Measurements: weight change, weight  Biochemical Data, Medical Tests and Procedures: gastrointestinal profile, glucose/endocrine profile, electrolyte and renal panel  Nutrition-Focused Physical Findings: overall appearance     Malnutrition Assessment  Clinical Characteristic:    Malnutrition in the context of chronic illness    Physical Findings   Body Fat Depletion:   o Moderate depletion of orbitals    Muscle Mass Depletion:   o severe depletion of temples, clavical   Fluid Accumulation:    o none      Nutrition Follow-Up    RD Follow-up?: Yes    Bernadette Seaman RD 11/18/2019 10:10 AM

## 2019-11-19 VITALS
OXYGEN SATURATION: 95 % | HEART RATE: 85 BPM | HEIGHT: 66 IN | BODY MASS INDEX: 20.09 KG/M2 | RESPIRATION RATE: 18 BRPM | SYSTOLIC BLOOD PRESSURE: 108 MMHG | TEMPERATURE: 98 F | DIASTOLIC BLOOD PRESSURE: 69 MMHG | WEIGHT: 125 LBS

## 2019-11-19 PROBLEM — R53.1 WEAKNESS: Status: RESOLVED | Noted: 2019-11-15 | Resolved: 2019-11-19

## 2019-11-19 PROBLEM — R79.89 PRERENAL AZOTEMIA: Status: RESOLVED | Noted: 2019-11-15 | Resolved: 2019-11-19

## 2019-11-19 PROBLEM — E87.6 HYPOKALEMIA: Status: RESOLVED | Noted: 2019-11-15 | Resolved: 2019-11-19

## 2019-11-19 PROCEDURE — G0378 HOSPITAL OBSERVATION PER HR: HCPCS

## 2019-11-19 PROCEDURE — 25000003 PHARM REV CODE 250: Performed by: INTERNAL MEDICINE

## 2019-11-19 PROCEDURE — 25000003 PHARM REV CODE 250: Performed by: HOSPITALIST

## 2019-11-19 RX ORDER — CEFUROXIME AXETIL 250 MG/1
250 TABLET ORAL 2 TIMES DAILY
Qty: 10 TABLET | Refills: 0 | Status: ON HOLD | OUTPATIENT
Start: 2019-11-19 | End: 2019-11-26 | Stop reason: HOSPADM

## 2019-11-19 RX ADMIN — CARBIDOPA AND LEVODOPA 2 TABLET: 25; 100 TABLET ORAL at 09:11

## 2019-11-19 RX ADMIN — POTASSIUM CHLORIDE 40 MEQ: 20 TABLET, EXTENDED RELEASE ORAL at 03:11

## 2019-11-19 RX ADMIN — LOSARTAN POTASSIUM 50 MG: 50 TABLET, FILM COATED ORAL at 09:11

## 2019-11-19 RX ADMIN — CEFUROXIME AXETIL 250 MG: 250 TABLET, FILM COATED ORAL at 09:11

## 2019-11-19 RX ADMIN — HYDROCODONE BITARTRATE AND ACETAMINOPHEN 1 TABLET: 7.5; 325 TABLET ORAL at 04:11

## 2019-11-19 NOTE — PLAN OF CARE
Problem: Fall Injury Risk  Goal: Absence of Fall and Fall-Related Injury  Outcome: Ongoing, Progressing     Problem: Adult Inpatient Plan of Care  Goal: Plan of Care Review  Outcome: Ongoing, Progressing     Problem: Adult Inpatient Plan of Care  Goal: Patient-Specific Goal (Individualization)  Outcome: Ongoing, Progressing     Problem: Adult Inpatient Plan of Care  Goal: Optimal Comfort and Wellbeing  Outcome: Ongoing, Progressing     Problem: Adult Inpatient Plan of Care  Goal: Readiness for Transition of Care  Outcome: Ongoing, Progressing     Problem: Skin Injury Risk Increased  Goal: Skin Health and Integrity  Outcome: Ongoing, Progressing

## 2019-11-19 NOTE — CARE UPDATE
Patient discharging today with  services. CM spoke with patient and caregiver about freedom of choice to choose HH agency, but also explained that because he has [employer] insurance they place him with an agency that they are in-network with. Patient's caregiver signed Patient Choice form as his representative. HH referral faxed to [facility] and CM is currently waiting for reply of which agency he will be admitted to.

## 2019-11-20 NOTE — PT/OT/SLP DISCHARGE
Occupational Therapy Discharge Summary    Toni Kamara  MRN: [id number]   Principal Problem: Weakness generalized      Patient Discharged from acute Occupational Therapy on 11/19/2019.  Please refer to prior OT note dated 11/18/2019 for functional status.    Assessment:      Patient appropriate for care in another setting.    Objective:     GOALS:   Multidisciplinary Problems     Occupational Therapy Goals     Not on file          Multidisciplinary Problems (Resolved)        Problem: Occupational Therapy Goal    Goal Priority Disciplines Outcome Interventions   Occupational Therapy Goal   (Resolved)     OT, PT/OT Met    Description:  Goals to be met by: d/c     Patient will increase functional independence with ADLs by performing:    Feeding with Minimal Assistance.  UE Dressing with Minimal Assistance.  LE Dressing with Maximum Assistance.  Toileting from bedside commode with Moderate Assistance for hygiene and clothing management.   Supine to sit with Stand-by Assistance.  Toilet transfer to bedside commode with Stand-by Assistance with RW.                      Reasons for Discontinuation of Therapy Services  Transfer to alternate level of care.      Plan:     Patient Discharged to: Home with Home Health Service    Pastor Farias, OT  11/20/2019

## 2019-11-20 NOTE — HOSPITAL COURSE
During his hospital stay patient was mainly treated for severe dehydration, urinary tract infection and mild delirium due to acute illnesses as well as mild exacerbation of his Parkinson due to acute illnesses.  Today upon my assessment patient appears very calm cooperative and tells me that he does not want to go to any facility and would like to go home.  Family has arranged 24 hr sitter at home.  We also arranged home health upon discharge. Today upon my assessment patient is alert, oriented x3 and has good insight in his health issues.  Patient was also able to tell me all his children's name, his address, his wife's birthday and appears very good in terms of his decision-making capacity.  I instructed patient as well as family to address his power of  as an outpatient.  He was advised to follow up with his PCP as well as neurologist.  He was discharged hemodynamically stable condition.     Review of Systems   patient denies fever chills chest pain shortness of breath abdominal pain nausea vomiting.  Generalized weakness better. Otherwise comprehensive review of systems are unchanged    Physical Exam   patient appears better is ambulating in the [place] with physical therapy  HEENT sclerae nonicteric  Neck is supple nontender  Lungs are clear  Heart is regular  Abdomen is soft  Extremities no edema  Neuro patient is alert he is oriented x3   exam no Jasso  Psych patient is pleasant cooperative    Vital signs reviewed, nursing notes reviewed

## 2019-11-20 NOTE — DISCHARGE SUMMARY
"[facility] Medicine  Discharge Summary    DOS:11/19/19      Patient Name: Toni Kamara  MRN: [id number]  Admission Date: 11/14/2019  Hospital Length of Stay: 0 days  Discharge Date and Time:  11/19/2019 7:00 PM  Attending Physician: No att. providers found   Discharging Provider: Elmer Poole MD  Primary Care Provider: Harinder Stout MD      HPI:   79 year male brought to ED for weakness, fatigue and falling at home. "Falling" = twice in past 3 days the patient slid off of his bed and onto the floor because he was too weak to hold himself up. No LOC. Did not strike head. No LOP/LOM. Suffers Parkinsonism. No CP/SOB. No history of orthopnea/PND. Laboratory analysis reveals pre-renal azotemia with dirty urine.  CT Head = no acute pathology. CXR = NAPD. EKG = no acute ischemic changes. Patient states he does NOT wish to be resuscitated in the event of cardio-pulomonary arrest    * No surgery found *      Hospital Course:   During his hospital stay patient was mainly treated for severe dehydration, urinary tract infection and mild delirium due to acute illnesses as well as mild exacerbation of his Parkinson due to acute illnesses.  Today upon my assessment patient appears very calm cooperative and tells me that he does not want to go to any facility and would like to go home.  Family has arranged 24 hr sitter at home.  We also arranged home health upon discharge. Today upon my assessment patient is alert, oriented x3 and has good insight in his health issues.  Patient was also able to tell me all his children's name, his address, his wife's birthday and appears very good in terms of his decision-making capacity.  I instructed patient as well as family to address his power of  as an outpatient.  He was advised to follow up with his PCP as well as neurologist.  He was discharged hemodynamically stable condition.     Review of Systems   patient denies fever chills chest pain shortness of " breath abdominal pain nausea vomiting.  Generalized weakness better. Otherwise comprehensive review of systems are unchanged    Physical Exam   patient appears better is ambulating in the [place] with physical therapy  HEENT sclerae nonicteric  Neck is supple nontender  Lungs are clear  Heart is regular  Abdomen is soft  Extremities no edema  Neuro patient is alert he is oriented x3   exam no Jasso  Psych patient is pleasant cooperative    Vital signs reviewed, nursing notes reviewed     Consults:   Consults (From admission, onward)        Status Ordering Provider     IP consult to case management  Once     Provider:  (Not yet assigned)    PARVEEN Kauffman new Assessment & Plan notes have been filed under this hospital service since the last note was generated.  Service: Hospital Medicine    Final Active Diagnoses:    Diagnosis Date Noted POA    PRINCIPAL PROBLEM:  Weakness generalized [R53.1] 07/15/2018 Yes    Memory changes [R41.3] 11/17/2019 Yes    UTI (urinary tract infection) [N39.0] 11/15/2019 Yes      Problems Resolved During this Admission:    Diagnosis Date Noted Date Resolved POA    Prerenal azotemia [R79.89] 11/15/2019 11/19/2019 Yes    Weakness [R53.1] 11/15/2019 11/19/2019 Yes    Hypokalemia [E87.6] 11/15/2019 11/19/2019 No       Discharged Condition: good    Disposition: Home-Health Care [facility]    Follow Up:  Follow-up Information     Harinder Stout MD In 1 week.    Specialty:  Family Medicine  Contact information:  [facility] [id number]  [phone]             [facility] WILL FOLLOW UP FOR HOME HEALTH.               Patient Instructions:      Referral to Home health   Referral Priority: Routine Referral Type: Home Health Care   Referral Reason: Specialty Services Required   Requested Specialty: Home Health Services   Number of Visits Requested: 1     Diet Cardiac     Other restrictions (specify):   Order Comments: Needs 24hrs supervision      Notify your health care provider if you experience any of the following:  temperature >100.4     Activity as tolerated       Significant Diagnostic Studies: Labs:   BMP:   Recent Labs   Lab 11/18/19  0611         K 3.3*      CO2 27   BUN 25*   CREATININE 1.1   CALCIUM 8.3*       Pending Diagnostic Studies:     None         Medications:  Reconciled Home Medications:      Medication List      START taking these medications    cefUROXime 250 MG tablet  Commonly known as:  CEFTIN  Take 1 tablet (250 mg total) by mouth 2 (two) times daily. for 5 days        CONTINUE taking these medications    carbidopa-levodopa  mg  mg per tablet  Commonly known as:  SINEMET  Take 2 tablets by mouth 3 (three) times daily.     clonazePAM 1 MG tablet  Commonly known as:  KLONOPIN  Take 1.5 tablets (1.5 mg total) by mouth every evening.     losartan 50 MG tablet  Commonly known as:  COZAAR  Take 1 tablet (50 mg total) by mouth once daily.     oxyCODONE-acetaminophen 5-325 mg per tablet  Commonly known as:  PERCOCET  Take 1 tablet by mouth every 12 (twelve) hours as needed for Pain.            Indwelling Lines/Drains at time of discharge:   Lines/Drains/Airways     None                 Time spent on the discharge of patient: 27 minutes  Patient was seen and examined on the date of discharge and determined to be suitable for discharge.         Elmer Poole MD  Department of Hospital Medicine  [facility]

## 2019-11-22 ENCOUNTER — HOSPITAL ENCOUNTER (INPATIENT)
Facility: HOSPITAL | Age: 79
LOS: 4 days | Discharge: HOME HEALTH | DRG: 689 | End: 2019-11-26
Attending: FAMILY MEDICINE | Admitting: HOSPITALIST
Payer: MEDICARE

## 2019-11-22 DIAGNOSIS — N39.0 SEPSIS DUE TO URINARY TRACT INFECTION: Primary | ICD-10-CM

## 2019-11-22 DIAGNOSIS — N39.0 UTI (URINARY TRACT INFECTION): ICD-10-CM

## 2019-11-22 DIAGNOSIS — F02.80 DEMENTIA DUE TO PARKINSON'S DISEASE WITHOUT BEHAVIORAL DISTURBANCE: ICD-10-CM

## 2019-11-22 DIAGNOSIS — R00.0 TACHYCARDIA: ICD-10-CM

## 2019-11-22 DIAGNOSIS — A41.9 SEPSIS DUE TO URINARY TRACT INFECTION: Primary | ICD-10-CM

## 2019-11-22 DIAGNOSIS — G20.A1 DEMENTIA DUE TO PARKINSON'S DISEASE WITHOUT BEHAVIORAL DISTURBANCE: ICD-10-CM

## 2019-11-22 PROCEDURE — 36415 COLL VENOUS BLD VENIPUNCTURE: CPT

## 2019-11-22 PROCEDURE — 87086 URINE CULTURE/COLONY COUNT: CPT

## 2019-11-22 PROCEDURE — 87040 BLOOD CULTURE FOR BACTERIA: CPT

## 2019-11-22 PROCEDURE — 85025 COMPLETE CBC W/AUTO DIFF WBC: CPT

## 2019-11-22 PROCEDURE — 81000 URINALYSIS NONAUTO W/SCOPE: CPT

## 2019-11-22 PROCEDURE — 99291 CRITICAL CARE FIRST HOUR: CPT | Mod: 25

## 2019-11-22 PROCEDURE — 87077 CULTURE AEROBIC IDENTIFY: CPT

## 2019-11-22 PROCEDURE — 96365 THER/PROPH/DIAG IV INF INIT: CPT

## 2019-11-22 PROCEDURE — 12000002 HC ACUTE/MED SURGE SEMI-PRIVATE ROOM

## 2019-11-22 PROCEDURE — 87186 SC STD MICRODIL/AGAR DIL: CPT

## 2019-11-22 PROCEDURE — 63600175 PHARM REV CODE 636 W HCPCS: Performed by: EMERGENCY MEDICINE

## 2019-11-22 PROCEDURE — 87088 URINE BACTERIA CULTURE: CPT

## 2019-11-22 PROCEDURE — 96361 HYDRATE IV INFUSION ADD-ON: CPT

## 2019-11-22 PROCEDURE — 83605 ASSAY OF LACTIC ACID: CPT

## 2019-11-22 PROCEDURE — 80053 COMPREHEN METABOLIC PANEL: CPT

## 2019-11-22 RX ORDER — LIDOCAINE HYDROCHLORIDE 20 MG/ML
JELLY TOPICAL
Status: DISPENSED
Start: 2019-11-22 | End: 2019-11-23

## 2019-11-22 RX ADMIN — SODIUM CHLORIDE 1000 ML: 0.9 INJECTION, SOLUTION INTRAVENOUS at 11:11

## 2019-11-23 PROBLEM — R00.0 TACHYCARDIA: Status: ACTIVE | Noted: 2019-11-23

## 2019-11-23 LAB
ALBUMIN SERPL BCP-MCNC: 3 G/DL (ref 3.5–5.2)
ALP SERPL-CCNC: 88 U/L (ref 55–135)
ALT SERPL W/O P-5'-P-CCNC: 6 U/L (ref 10–44)
ANION GAP SERPL CALC-SCNC: 11 MMOL/L (ref 8–16)
AST SERPL-CCNC: 15 U/L (ref 10–40)
BACTERIA #/AREA URNS HPF: ABNORMAL /HPF
BASOPHILS # BLD AUTO: 0.03 K/UL (ref 0–0.2)
BASOPHILS NFR BLD: 0.2 % (ref 0–1.9)
BILIRUB SERPL-MCNC: 0.6 MG/DL (ref 0.1–1)
BILIRUB UR QL STRIP: NEGATIVE
BUN SERPL-MCNC: 50 MG/DL (ref 8–23)
CALCIUM SERPL-MCNC: 8.8 MG/DL (ref 8.7–10.5)
CHLORIDE SERPL-SCNC: 101 MMOL/L (ref 95–110)
CLARITY UR: ABNORMAL
CO2 SERPL-SCNC: 27 MMOL/L (ref 23–29)
COLOR UR: YELLOW
CREAT SERPL-MCNC: 1.8 MG/DL (ref 0.5–1.4)
DIFFERENTIAL METHOD: ABNORMAL
EOSINOPHIL # BLD AUTO: 0 K/UL (ref 0–0.5)
EOSINOPHIL NFR BLD: 0 % (ref 0–8)
ERYTHROCYTE [DISTWIDTH] IN BLOOD BY AUTOMATED COUNT: 13.4 % (ref 11.5–14.5)
EST. GFR  (AFRICAN AMERICAN): 41 ML/MIN/1.73 M^2
EST. GFR  (NON AFRICAN AMERICAN): 35 ML/MIN/1.73 M^2
GLUCOSE SERPL-MCNC: 127 MG/DL (ref 70–110)
GLUCOSE UR QL STRIP: NEGATIVE
HCT VFR BLD AUTO: 35.9 % (ref 40–54)
HGB BLD-MCNC: 11.4 G/DL (ref 14–18)
HGB UR QL STRIP: ABNORMAL
HYALINE CASTS #/AREA URNS LPF: 4 /LPF
IMM GRANULOCYTES # BLD AUTO: 0.11 K/UL (ref 0–0.04)
INFLUENZA A, MOLECULAR: NEGATIVE
INFLUENZA B, MOLECULAR: NEGATIVE
KETONES UR QL STRIP: NEGATIVE
LACTATE SERPL-SCNC: 0.6 MMOL/L (ref 0.5–2.2)
LACTATE SERPL-SCNC: 1.7 MMOL/L (ref 0.5–2.2)
LEUKOCYTE ESTERASE UR QL STRIP: ABNORMAL
LYMPHOCYTES # BLD AUTO: 0.4 K/UL (ref 1–4.8)
LYMPHOCYTES NFR BLD: 2.7 % (ref 18–48)
MCH RBC QN AUTO: 31.6 PG (ref 27–31)
MCHC RBC AUTO-ENTMCNC: 31.8 G/DL (ref 32–36)
MCV RBC AUTO: 99 FL (ref 82–98)
MICROSCOPIC COMMENT: ABNORMAL
MONOCYTES # BLD AUTO: 0.7 K/UL (ref 0.3–1)
MONOCYTES NFR BLD: 4 % (ref 4–15)
NEUTROPHILS # BLD AUTO: 15.3 K/UL (ref 1.8–7.7)
NEUTROPHILS NFR BLD: 92.4 % (ref 38–73)
NITRITE UR QL STRIP: POSITIVE
NRBC BLD-RTO: 0 /100 WBC
PH UR STRIP: 6 [PH] (ref 5–8)
PLATELET # BLD AUTO: 208 K/UL (ref 150–350)
PMV BLD AUTO: 10.9 FL (ref 9.2–12.9)
POTASSIUM SERPL-SCNC: 4.4 MMOL/L (ref 3.5–5.1)
PROT SERPL-MCNC: 6.7 G/DL (ref 6–8.4)
PROT UR QL STRIP: ABNORMAL
RBC # BLD AUTO: 3.61 M/UL (ref 4.6–6.2)
RBC #/AREA URNS HPF: 10 /HPF (ref 0–4)
SODIUM SERPL-SCNC: 139 MMOL/L (ref 136–145)
SP GR UR STRIP: 1.02 (ref 1–1.03)
SPECIMEN SOURCE: NORMAL
SQUAMOUS #/AREA URNS HPF: 2 /HPF
URN SPEC COLLECT METH UR: ABNORMAL
UROBILINOGEN UR STRIP-ACNC: NEGATIVE EU/DL
WBC # BLD AUTO: 16.56 K/UL (ref 3.9–12.7)
WBC #/AREA URNS HPF: 40 /HPF (ref 0–5)

## 2019-11-23 PROCEDURE — 63600175 PHARM REV CODE 636 W HCPCS: Performed by: FAMILY MEDICINE

## 2019-11-23 PROCEDURE — 94761 N-INVAS EAR/PLS OXIMETRY MLT: CPT

## 2019-11-23 PROCEDURE — 25000003 PHARM REV CODE 250: Performed by: INTERNAL MEDICINE

## 2019-11-23 PROCEDURE — 83605 ASSAY OF LACTIC ACID: CPT

## 2019-11-23 PROCEDURE — 12000002 HC ACUTE/MED SURGE SEMI-PRIVATE ROOM

## 2019-11-23 PROCEDURE — 87502 INFLUENZA DNA AMP PROBE: CPT

## 2019-11-23 PROCEDURE — 25000003 PHARM REV CODE 250: Performed by: EMERGENCY MEDICINE

## 2019-11-23 PROCEDURE — 36415 COLL VENOUS BLD VENIPUNCTURE: CPT

## 2019-11-23 PROCEDURE — 93005 ELECTROCARDIOGRAM TRACING: CPT

## 2019-11-23 PROCEDURE — 63600175 PHARM REV CODE 636 W HCPCS: Performed by: INTERNAL MEDICINE

## 2019-11-23 PROCEDURE — 63600175 PHARM REV CODE 636 W HCPCS: Performed by: EMERGENCY MEDICINE

## 2019-11-23 RX ORDER — POLYETHYLENE GLYCOL 3350 17 G/17G
17 POWDER, FOR SOLUTION ORAL DAILY
Status: DISCONTINUED | OUTPATIENT
Start: 2019-11-24 | End: 2019-11-26 | Stop reason: HOSPADM

## 2019-11-23 RX ORDER — SODIUM CHLORIDE 9 MG/ML
INJECTION, SOLUTION INTRAVENOUS CONTINUOUS
Status: DISCONTINUED | OUTPATIENT
Start: 2019-11-23 | End: 2019-11-25

## 2019-11-23 RX ORDER — CARBIDOPA AND LEVODOPA 25; 100 MG/1; MG/1
2 TABLET ORAL EVERY 4 HOURS
Status: DISCONTINUED | OUTPATIENT
Start: 2019-11-23 | End: 2019-11-26 | Stop reason: HOSPADM

## 2019-11-23 RX ORDER — LOSARTAN POTASSIUM 25 MG/1
50 TABLET ORAL DAILY
Status: CANCELLED | OUTPATIENT
Start: 2019-11-23

## 2019-11-23 RX ORDER — ENOXAPARIN SODIUM 100 MG/ML
30 INJECTION SUBCUTANEOUS DAILY
Status: DISCONTINUED | OUTPATIENT
Start: 2019-11-24 | End: 2019-11-25

## 2019-11-23 RX ORDER — BISACODYL 5 MG
10 TABLET, DELAYED RELEASE (ENTERIC COATED) ORAL ONCE
Status: COMPLETED | OUTPATIENT
Start: 2019-11-24 | End: 2019-11-24

## 2019-11-23 RX ORDER — ONDANSETRON 2 MG/ML
8 INJECTION INTRAMUSCULAR; INTRAVENOUS EVERY 8 HOURS PRN
Status: DISCONTINUED | OUTPATIENT
Start: 2019-11-23 | End: 2019-11-26 | Stop reason: HOSPADM

## 2019-11-23 RX ORDER — TALC
6 POWDER (GRAM) TOPICAL NIGHTLY PRN
Status: DISCONTINUED | OUTPATIENT
Start: 2019-11-23 | End: 2019-11-26 | Stop reason: HOSPADM

## 2019-11-23 RX ORDER — OXYCODONE AND ACETAMINOPHEN 5; 325 MG/1; MG/1
1 TABLET ORAL
Status: COMPLETED | OUTPATIENT
Start: 2019-11-23 | End: 2019-11-23

## 2019-11-23 RX ORDER — AMOXICILLIN 250 MG
1 CAPSULE ORAL 2 TIMES DAILY
Status: DISCONTINUED | OUTPATIENT
Start: 2019-11-23 | End: 2019-11-26 | Stop reason: HOSPADM

## 2019-11-23 RX ORDER — LANOLIN ALCOHOL/MO/W.PET/CERES
800 CREAM (GRAM) TOPICAL
Status: DISCONTINUED | OUTPATIENT
Start: 2019-11-23 | End: 2019-11-26 | Stop reason: HOSPADM

## 2019-11-23 RX ORDER — ACETAMINOPHEN 500 MG
1000 TABLET ORAL
Status: COMPLETED | OUTPATIENT
Start: 2019-11-23 | End: 2019-11-23

## 2019-11-23 RX ORDER — SODIUM CHLORIDE 0.9 % (FLUSH) 0.9 %
10 SYRINGE (ML) INJECTION
Status: DISCONTINUED | OUTPATIENT
Start: 2019-11-23 | End: 2019-11-26 | Stop reason: HOSPADM

## 2019-11-23 RX ORDER — ACETAMINOPHEN 10 MG/ML
1000 INJECTION, SOLUTION INTRAVENOUS EVERY 8 HOURS
Status: DISCONTINUED | OUTPATIENT
Start: 2019-11-23 | End: 2019-11-23

## 2019-11-23 RX ORDER — POTASSIUM CHLORIDE 20 MEQ/15ML
40 SOLUTION ORAL
Status: DISCONTINUED | OUTPATIENT
Start: 2019-11-23 | End: 2019-11-26 | Stop reason: HOSPADM

## 2019-11-23 RX ORDER — OXYCODONE AND ACETAMINOPHEN 5; 325 MG/1; MG/1
1 TABLET ORAL EVERY 12 HOURS PRN
Status: DISCONTINUED | OUTPATIENT
Start: 2019-11-23 | End: 2019-11-26 | Stop reason: HOSPADM

## 2019-11-23 RX ORDER — ACETAMINOPHEN 10 MG/ML
1000 INJECTION, SOLUTION INTRAVENOUS ONCE
Status: COMPLETED | OUTPATIENT
Start: 2019-11-23 | End: 2019-11-23

## 2019-11-23 RX ORDER — ACETAMINOPHEN 500 MG
1000 TABLET ORAL EVERY 6 HOURS PRN
Status: DISCONTINUED | OUTPATIENT
Start: 2019-11-23 | End: 2019-11-26 | Stop reason: HOSPADM

## 2019-11-23 RX ORDER — CARBIDOPA AND LEVODOPA 25; 100 MG/1; MG/1
2 TABLET ORAL 3 TIMES DAILY
Status: DISCONTINUED | OUTPATIENT
Start: 2019-11-23 | End: 2019-11-23

## 2019-11-23 RX ADMIN — CARBIDOPA AND LEVODOPA 2 TABLET: 25; 100 TABLET ORAL at 06:11

## 2019-11-23 RX ADMIN — CARBIDOPA AND LEVODOPA 2 TABLET: 25; 100 TABLET ORAL at 02:11

## 2019-11-23 RX ADMIN — SODIUM CHLORIDE: 0.9 INJECTION, SOLUTION INTRAVENOUS at 05:11

## 2019-11-23 RX ADMIN — ACETAMINOPHEN 1000 MG: 10 INJECTION, SOLUTION INTRAVENOUS at 08:11

## 2019-11-23 RX ADMIN — ACETAMINOPHEN 1000 MG: 500 TABLET ORAL at 11:11

## 2019-11-23 RX ADMIN — OXYCODONE AND ACETAMINOPHEN 1 TABLET: 5; 325 TABLET ORAL at 02:11

## 2019-11-23 RX ADMIN — OXYCODONE AND ACETAMINOPHEN 1 TABLET: 5; 325 TABLET ORAL at 09:11

## 2019-11-23 RX ADMIN — CARBIDOPA AND LEVODOPA 2 TABLET: 25; 100 TABLET ORAL at 09:11

## 2019-11-23 RX ADMIN — CEFTRIAXONE 1 G: 1 INJECTION, SOLUTION INTRAVENOUS at 01:11

## 2019-11-23 RX ADMIN — ACETAMINOPHEN 1000 MG: 500 TABLET, COATED ORAL at 12:11

## 2019-11-23 RX ADMIN — SENNOSIDES AND DOCUSATE SODIUM 1 TABLET: 8.6; 5 TABLET ORAL at 09:11

## 2019-11-23 NOTE — CONSULTS
"  [facility]  Adult Nutrition  Consult Note    SUMMARY     Recommendations     1. Rec SLP eval for safest texture and eval for therapy needs   2. Assistance with meals as needed   3. On-site RD to f/u with NFPE and progress    Goals: Meet > 85% EEN daily  Nutrition Goal Status: new  Communication of RD Recs: (POC)    Reason for Assessment    Reason For Assessment: consult  Diagnosis: (UTI)  Relevant Medical History: Parkinson's  Interdisciplinary Rounds: did not attend    General Information Comments: Soft diet ordered this afternoon, no meal received as yet. Noted in nsg assessment pt failed bedside swallow. Pt with coughing after taking pills per nsg, pt stated this is due to difficulty positioning head due to Parkinson's. Wt stable (and up) for past 3 months per prev adm records. no NFPE: remote coverage, in-house RD to f/u.    Nutrition Discharge Planning: Too soon to determine    Nutrition Risk Screen    Nutrition Risk Screen: dysphagia or difficulty swallowing    Nutrition/Diet History    Spiritual, Cultural Beliefs, Nondenominational Practices, Values that Affect Care: yes(Restorationism)  Factors Affecting Nutritional Intake: difficulty/impaired swallowing    Anthropometrics    Temp: 97.6 °F (36.4 °C)  Height Method: Stated  Height: 5' 6" (167.6 cm)  Height (inches): 66 in  Weight Method: Stated  Weight: 56.7 kg (125 lb)  Weight (lb): 125 lb  Ideal Body Weight (IBW), Male: 142 lb  % Ideal Body Weight, Male (lb): 88.03 lb  BMI (Calculated): 20.2  BMI Grade: 18.5-24.9 - normal       Lab/Procedures/Meds    Pertinent Labs Reviewed: reviewed  Pertinent Labs Comments: BUN 50; Cr 1.8; GFR 41; K 3.3;   Pertinent Medications Reviewed: reviewed  Pertinent Medications Comments: abx    Estimated/Assessed Needs    Weight Used For Calorie Calculations: 56.7 kg (125 lb)  Energy Calorie Requirements (kcal): 2569-7465 kcal (x 1.2-1.3)  Energy Need Method: Delfina Delgadillo  Protein Requirements: 68g (x " 1.2g/kg)  Weight Used For Protein Calculations: 56.7 kg (125 lb)     Estimated Fluid Requirement Method: RDA Method  RDA Method (mL): 1500    Nutrition Prescription Ordered    Current Diet Order: Dysphagia Soft (level 6)    Evaluation of Received Nutrient/Fluid Intake    I/O: reviewed  Energy Calories Required: (unknown at this time)  Protein Required: (unknown at this time)  Fluid Required: (per MD)  Comments: LBM: 11/14    % Meal Intake: Other: Diet just advanced    Nutrition Risk    Level of Risk/Frequency of Follow-up: (1 x week)     Assessment and Plan    Nutrition Problem  Difficulty swallowing    Related to (etiology):   Parkinson's    Signs and Symptoms (as evidenced by):   Difficulty swallowing pills; Dysphagia Soft diet ordered    Interventions  Collaboration with providers    Nutrition Diagnosis Status:   New     Monitor and Evaluation    Food and Nutrient Intake: energy intake, food and beverage intake  Food and Nutrient Adminstration: diet order  Anthropometric Measurements: weight, weight change  Biochemical Data, Medical Tests and Procedures: electrolyte and renal panel  Nutrition-Focused Physical Findings: overall appearance     Malnutrition Assessment       RD to f/u         Nutrition Follow-Up    RD Follow-up?: Yes

## 2019-11-23 NOTE — NURSING
Pt has ax temp of 102.1 and is NPO, phoned Dr. Miranda and got verbal order for acetaminophen IV.

## 2019-11-23 NOTE — HPI
The patient is a 79-year-old gentleman who presented to the emergency room with fever.  His blood work reveals leukocytosis, acute renal failure and an abnormal urinalysis.  The patient is a very poor historian and there are no family members at the bedside.  He denies having fever, nausea, flank pain or dysuria.  He was admitted to [facility] about a week and a half ago and received treatment for dehydration and a urinary tract infection.  He was discharged home on Ceftin.  I was unable to find urine culture results.

## 2019-11-23 NOTE — H&P
[facility] Medicine  History & Physical    Patient Name: Toni Kamara  MRN: [id number]  Admission Date: 11/22/2019  Attending Physician: Toni Sow MD   Primary Care Provider: Harinder Stout MD         Patient information was obtained from patient and ER records.     Subjective:     Principal Problem:UTI (urinary tract infection)    Chief Complaint:   Chief Complaint   Patient presents with    Fever        HPI: The patient is a 79-year-old gentleman who presented to the emergency room with fever.  His blood work reveals leukocytosis, acute renal failure and an abnormal urinalysis.  The patient is a very poor historian and there are no family members at the bedside.  He denies having fever, nausea, flank pain or dysuria.  He was admitted to [facility] about a week and a half ago and received treatment for dehydration and a urinary tract infection.  He was discharged home on Ceftin.  I was unable to find urine culture results.    Past Medical History:   Diagnosis Date    Anxiety 1/25/2012    Arthritis of knee 1/25/2012    Back pain 1/25/2012    BPH (benign prostatic hyperplasia) 1/25/2012    Essential tremor 3/19/2014    Kyphoscoliosis 1/25/2012    Parkinson's disease        Past Surgical History:   Procedure Laterality Date    APPENDECTOMY      FRACTURE SURGERY Left Dec 18, 2014    HEMORRHOID SURGERY      PROSTATE SURGERY  2008    TURP and had renal insu from obsr    TONSILLECTOMY         Review of patient's allergies indicates:  No Known Allergies    No current facility-administered medications on file prior to encounter.      Current Outpatient Medications on File Prior to Encounter   Medication Sig    carbidopa-levodopa  mg (SINEMET)  mg per tablet Take 2 tablets by mouth 3 (three) times daily.     cefUROXime (CEFTIN) 250 MG tablet Take 1 tablet (250 mg total) by mouth 2 (two) times daily. for 5 days    clonazePAM (KLONOPIN) 1 MG tablet Take 1.5  tablets (1.5 mg total) by mouth every evening.    losartan (COZAAR) 50 MG tablet Take 1 tablet (50 mg total) by mouth once daily.    oxyCODONE-acetaminophen (PERCOCET) 5-325 mg per tablet Take 1 tablet by mouth every 12 (twelve) hours as needed for Pain.     Family History     Problem Relation (Age of Onset)    Heart disease Mother (79)    Parkinsonism Father (83)        Tobacco Use    Smoking status: Former Smoker    Smokeless tobacco: Never Used   Substance and Sexual Activity    Alcohol use: No    Drug use: No    Sexual activity: Not on file     Review of Systems   Unable to perform ROS: Mental status change     Objective:     Vital Signs (Most Recent):  Temp: (!) 101.6 °F (38.7 °C) (11/23/19 0015)  Pulse: 88 (11/22/19 2243)  Resp: (!) 22 (11/22/19 2243)  BP: (!) 132/94 (11/22/19 2243)  SpO2: 96 % (11/22/19 2243) Vital Signs (24h Range):  Temp:  [100.6 °F (38.1 °C)-101.6 °F (38.7 °C)] 101.6 °F (38.7 °C)  Pulse:  [88] 88  Resp:  [22] 22  SpO2:  [96 %] 96 %  BP: (132)/(94) 132/94     Weight: 56.7 kg (125 lb)  Body mass index is 20.18 kg/m².    Physical Exam   Constitutional: He appears well-developed and well-nourished.   HENT:   Head: Normocephalic and atraumatic.   Eyes: Pupils are equal, round, and reactive to light. Conjunctivae and EOM are normal.   Neck: Normal range of motion. Neck supple.   Cardiovascular: Normal rate, regular rhythm, normal heart sounds and intact distal pulses. Exam reveals no gallop and no friction rub.   No murmur heard.  Pulmonary/Chest: Effort normal and breath sounds normal. No respiratory distress. He has no rales.   Abdominal: Soft. Bowel sounds are normal. He exhibits no distension. There is no tenderness. There is no rebound.   Musculoskeletal: Normal range of motion. He exhibits no edema.   Neurological: He is alert. No cranial nerve deficit or sensory deficit. He exhibits normal muscle tone. Coordination normal.   The patient has an intentional tremor.    The patient is  alert but oriented to self only.   Skin: Skin is warm and dry.   Psychiatric: He has a normal mood and affect. His behavior is normal. Judgment and thought content normal.   Vitals reviewed.        CRANIAL NERVES     CN III, IV, VI   Pupils are equal, round, and reactive to light.  Extraocular motions are normal.        Significant Labs:   CBC:   Recent Labs   Lab 11/22/19 2351   WBC 16.56*   HGB 11.4*   HCT 35.9*        CMP:   Recent Labs   Lab 11/22/19 2351      K 4.4      CO2 27   *   BUN 50*   CREATININE 1.8*   CALCIUM 8.8   PROT 6.7   ALBUMIN 3.0*   BILITOT 0.6   ALKPHOS 88   AST 15   ALT 6*   ANIONGAP 11   EGFRNONAA 35*     Lactic Acid:   Recent Labs   Lab 11/22/19 2351   LACTATE 1.7     Urine Studies:   Recent Labs   Lab 11/22/19 2357   COLORU Yellow   APPEARANCEUA Hazy*   PHUR 6.0   SPECGRAV 1.020   PROTEINUA 2+*   GLUCUA Negative   KETONESU Negative   BILIRUBINUA Negative   OCCULTUA 3+*   NITRITE Positive*   UROBILINOGEN Negative   LEUKOCYTESUR 1+*   RBCUA 10*   WBCUA 40*   BACTERIA Many*   SQUAMEPITHEL 2   HYALINECASTS 4*       Significant Imaging: I have reviewed all pertinent imaging results/findings within the past 24 hours.    Assessment/Plan:     * UTI (urinary tract infection)  The patient has a urinary tract infection.  He received Rocephin in the emergency room and we obtained a urine culture.    He was unable to answer most of my questions and I do not know if he has underlying dementia or a metabolic encephalopathy secondary to the infection.      STEWART (acute kidney injury)  The patient has acute kidney failure which is probably secondary to decreased oral intake.  He does not have urinary retention.  He will receive IV fluids and we will check his kidney function again in 24 hrs.      Essential hypertension    Stable.  I am not sure if he takes antihypertensives at home.  I will treat his blood pressure as needed.  We'll need to obtain an accurate home medication  list from his family in the morning.    Parkinson disease  Stable.  Continue home meds.        VTE Risk Mitigation (From admission, onward)    None             Jyoti El MD  Department of Hospital Medicine   [facility]

## 2019-11-23 NOTE — ASSESSMENT & PLAN NOTE
The patient has a urinary tract infection.  He received Rocephin in the emergency room and we obtained a urine culture.    He was unable to answer most of my questions and I do not know if he has underlying dementia or a metabolic encephalopathy secondary to the infection.

## 2019-11-23 NOTE — PROGRESS NOTES
Patient seen in his room resting today. Had a fever of 102.1 that has resolved. Continue to monitor. Cultures pending.

## 2019-11-23 NOTE — SUBJECTIVE & OBJECTIVE
Past Medical History:   Diagnosis Date    Anxiety 1/25/2012    Arthritis of knee 1/25/2012    Back pain 1/25/2012    BPH (benign prostatic hyperplasia) 1/25/2012    Essential tremor 3/19/2014    Kyphoscoliosis 1/25/2012    Parkinson's disease        Past Surgical History:   Procedure Laterality Date    APPENDECTOMY      FRACTURE SURGERY Left Dec 18, 2014    HEMORRHOID SURGERY      PROSTATE SURGERY  2008    TURP and had renal insu from obsr    TONSILLECTOMY         Review of patient's allergies indicates:  No Known Allergies    No current facility-administered medications on file prior to encounter.      Current Outpatient Medications on File Prior to Encounter   Medication Sig    carbidopa-levodopa  mg (SINEMET)  mg per tablet Take 2 tablets by mouth 3 (three) times daily.     cefUROXime (CEFTIN) 250 MG tablet Take 1 tablet (250 mg total) by mouth 2 (two) times daily. for 5 days    clonazePAM (KLONOPIN) 1 MG tablet Take 1.5 tablets (1.5 mg total) by mouth every evening.    losartan (COZAAR) 50 MG tablet Take 1 tablet (50 mg total) by mouth once daily.    oxyCODONE-acetaminophen (PERCOCET) 5-325 mg per tablet Take 1 tablet by mouth every 12 (twelve) hours as needed for Pain.     Family History     Problem Relation (Age of Onset)    Heart disease Mother (79)    Parkinsonism Father (83)        Tobacco Use    Smoking status: Former Smoker    Smokeless tobacco: Never Used   Substance and Sexual Activity    Alcohol use: No    Drug use: No    Sexual activity: Not on file     Review of Systems   Unable to perform ROS: Mental status change     Objective:     Vital Signs (Most Recent):  Temp: (!) 101.6 °F (38.7 °C) (11/23/19 0015)  Pulse: 88 (11/22/19 2243)  Resp: (!) 22 (11/22/19 2243)  BP: (!) 132/94 (11/22/19 2243)  SpO2: 96 % (11/22/19 2243) Vital Signs (24h Range):  Temp:  [100.6 °F (38.1 °C)-101.6 °F (38.7 °C)] 101.6 °F (38.7 °C)  Pulse:  [88] 88  Resp:  [22] 22  SpO2:  [96 %] 96 %  BP:  (132)/(94) 132/94     Weight: 56.7 kg (125 lb)  Body mass index is 20.18 kg/m².    Physical Exam   Constitutional: He appears well-developed and well-nourished.   HENT:   Head: Normocephalic and atraumatic.   Eyes: Pupils are equal, round, and reactive to light. Conjunctivae and EOM are normal.   Neck: Normal range of motion. Neck supple.   Cardiovascular: Normal rate, regular rhythm, normal heart sounds and intact distal pulses. Exam reveals no gallop and no friction rub.   No murmur heard.  Pulmonary/Chest: Effort normal and breath sounds normal. No respiratory distress. He has no rales.   Abdominal: Soft. Bowel sounds are normal. He exhibits no distension. There is no tenderness. There is no rebound.   Musculoskeletal: Normal range of motion. He exhibits no edema.   Neurological: He is alert. No cranial nerve deficit or sensory deficit. He exhibits normal muscle tone. Coordination normal.   The patient has an intentional tremor.    The patient is alert but oriented to self only.   Skin: Skin is warm and dry.   Psychiatric: He has a normal mood and affect. His behavior is normal. Judgment and thought content normal.   Vitals reviewed.        CRANIAL NERVES     CN III, IV, VI   Pupils are equal, round, and reactive to light.  Extraocular motions are normal.        Significant Labs:   CBC:   Recent Labs   Lab 11/22/19  2351   WBC 16.56*   HGB 11.4*   HCT 35.9*        CMP:   Recent Labs   Lab 11/22/19  2351      K 4.4      CO2 27   *   BUN 50*   CREATININE 1.8*   CALCIUM 8.8   PROT 6.7   ALBUMIN 3.0*   BILITOT 0.6   ALKPHOS 88   AST 15   ALT 6*   ANIONGAP 11   EGFRNONAA 35*     Lactic Acid:   Recent Labs   Lab 11/22/19  2351   LACTATE 1.7     Urine Studies:   Recent Labs   Lab 11/22/19  2357   COLORU Yellow   APPEARANCEUA Hazy*   PHUR 6.0   SPECGRAV 1.020   PROTEINUA 2+*   GLUCUA Negative   KETONESU Negative   BILIRUBINUA Negative   OCCULTUA 3+*   NITRITE Positive*   UROBILINOGEN Negative    LEUKOCYTESUR 1+*   RBCUA 10*   WBCUA 40*   BACTERIA Many*   SQUAMEPITHEL 2   HYALINECASTS 4*       Significant Imaging: I have reviewed all pertinent imaging results/findings within the past 24 hours.

## 2019-11-23 NOTE — ED NOTES
"Pt presents to the ED via EMS with complaints of fever. Recently dx with a UTI at [facility] earlier this week. He denies pain and has no other complaints at this time. Caregiver states the patient has been experiencing urinary urgency as well as dysuria today and also states he has had periods where he was "incoherent." Bed rails are up and call light is within patient reach. Will continue to monitor. Pt alert and Oriented to person place and time. Pt states year is 1919 but knows Mario Alberto is president.   "

## 2019-11-23 NOTE — NURSING
Spoke with caregiver, she states that pt does not have issues eating or swallowing. Pt stated that he coughed after taking his pills early this morning because he was having a hard time holding his head up and sometimes needs assistance with that due to his parkinsons disease. Mechanical soft diet initiated.

## 2019-11-23 NOTE — ASSESSMENT & PLAN NOTE
Stable.  I am not sure if he takes antihypertensives at home.  I will treat his blood pressure as needed.  We'll need to obtain an accurate home medication list from his family in the morning.

## 2019-11-23 NOTE — PLAN OF CARE
CM attempted to complete discharge assessment with pt bedside. Pt unable to verify information on facesheet as correct. CM will try to contact family.     1530 CM left VM for daughter and son's numbers on the facesheet requesting call back. Per chart review pt came from home with home health services.        11/23/19 1130   Discharge Assessment   Assessment Type Discharge Planning Assessment   Confirmed/corrected address and phone number on facesheet? Yes

## 2019-11-23 NOTE — PLAN OF CARE
Recommendations      1. Rec SLP eval for safest texture and eval for therapy needs   2. Assistance with meals as needed   3. On-site RD to f/u with NFPE and progress     Goals: Meet > 85% EEN daily  Nutrition Goal Status: new  Communication of RD Recs: (POC)

## 2019-11-23 NOTE — ED PROVIDER NOTES
Encounter Date: 11/22/2019    SCRIBE #1 NOTE: I, Elida Guzman, am scribing for, and in the presence of, Toni Sow MD.       History     Chief Complaint   Patient presents with    Fever       Time seen by provider: 11:04 PM on 11/22/2019    Toni Kamara is a 79 y.o. male with a PMHx of Parkinson's who presents to the ED via EMS with an onset of fever. Patient was seen at [facility] one week ago with severe dehydration, UTI, and delirium. Patient was alert and oriented at discharge. The patient denies any other symptoms at this time. No pertinent PSHx.      The history is provided by the patient. The history is limited by the condition of the patient.     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   HENT: Negative for sore throat.    Respiratory: Negative for shortness of breath.    Cardiovascular: Negative for chest pain.   Gastrointestinal: Negative for nausea.   Genitourinary: Negative for dysuria.   Musculoskeletal: Negative for back pain.   Skin: Negative for rash.   Neurological: Negative for weakness.   Hematological: Does not bruise/bleed easily.       Physical Exam     Initial  Vitals [11/22/19 2243]   BP Pulse Resp Temp SpO2   (!) 132/94 88 (!) 22 (!) 100.6 °F (38.1 °C) 96 %      MAP       --         Physical Exam    Nursing note and vitals reviewed.  Constitutional: He appears well-developed and well-nourished. No distress.   Diaper in place.   HENT:   Head: Normocephalic and atraumatic.   Mouth/Throat: Mucous membranes are dry.   No signs of head trauma.   Eyes: Conjunctivae and EOM are normal. Pupils are equal, round, and reactive to light.   Neck: Neck supple.   No meningismus.   Cardiovascular: Normal rate, regular rhythm and normal heart sounds. Exam reveals no gallop and no friction rub.    No murmur heard.  Pulmonary/Chest: Breath sounds normal. No respiratory distress. He has no wheezes. He has no rhonchi. He has no rales.   Abdominal: Soft. Bowel sounds are normal. He exhibits no distension. There is no tenderness.   Musculoskeletal: Normal range of motion. He exhibits no edema or tenderness.   No peripheral edema.   Neurological: He is alert and oriented to person, place, and time.   Tremors in BUEs and BLEs.   Skin: Skin is warm and dry.   Psychiatric: He has a normal mood and affect.         ED Course   Critical Care  Date/Time: 11/23/2019 3:05 AM  Performed by: Toni Sow MD  Authorized by: Toni Sow MD   Direct patient critical care time: 20 minutes  Additional history critical care time: 5 minutes  Ordering / reviewing critical care time: 5 minutes  Documentation critical care time: 5 minutes  Consulting other physicians critical care time: 5 minutes  Consult with family critical care time: 5 minutes  Total critical care time (exclusive of procedural time) : 45 minutes  Critical care was necessary to treat or prevent imminent or life-threatening deterioration of the following conditions: sepsis.  Critical care was time spent personally by me on the following activities: ordering and performing treatments and interventions, re-evaluation of patient's condition,  ordering and review of laboratory studies, pulse oximetry, review of old charts, evaluation of patient's response to treatment and discussions with consultants.        Labs Reviewed   URINALYSIS, REFLEX TO URINE CULTURE - Abnormal; Notable for the following components:       Result Value    Appearance, UA Hazy (*)     Protein, UA 2+ (*)     Occult Blood UA 3+ (*)     Nitrite, UA Positive (*)     Leukocytes, UA 1+ (*)     All other components within normal limits    Narrative:     Preferred Collection Type->Urine, Clean Catch   URINALYSIS MICROSCOPIC - Abnormal; Notable for the following components:    RBC, UA 10 (*)     WBC, UA 40 (*)     Bacteria Many (*)     Hyaline Casts, UA 4 (*)     All other components within normal limits    Narrative:     Preferred Collection Type->Urine, Clean Catch   CULTURE, BLOOD   CULTURE, BLOOD   INFLUENZA A & B BY MOLECULAR   CULTURE, URINE   CBC W/ AUTO DIFFERENTIAL   COMPREHENSIVE METABOLIC PANEL   LACTIC ACID, PLASMA     EKG Readings: (Independently Interpreted)   Initial Reading: No STEMI.   NSR at a rate of 94 bpm. Normal axis. Artifact. No ST elevation or depression.       Imaging Results          X-Ray Chest AP Portable (In process)                  Medical Decision Making:   History:   Old Medical Records: I decided to obtain old medical records.  Initial Assessment:   Patient is likely becoming septic from urinary tract infection causing acute delirium in the setting of Parkinson's disease.  I anticipate admission given his febrile tachycardic and his age. Patient was recently in the hospital liver similar symptoms and elected to leave early.  He needs admission at this time.  No family members are at the bedside.  I have given a dose of Rocephin at 12:36 a.m. given positive nitrites with leukocytes in the urine as well as bacteria.  No significant flank tenderness. No vomiting. I doubt this is pyelonephritis.  Independently Interpreted Test(s):   I have ordered and  independently interpreted X-rays - see prior notes.  I have ordered and independently interpreted EKG Reading(s) - see prior notes  Clinical Tests:   Lab Tests: Ordered and Reviewed  Radiological Study: Ordered and Reviewed  Medical Tests: Ordered and Reviewed  Patient has 2-300 mL on bladder scan.  He does not feel like he can't urinate.  I do not think he needs an indwelling Jasso catheter at this time.  We will monitor this closely.             Scribe Attestation:   Scribe #1: I performed the above scribed service and the documentation accurately describes the services I performed. I attest to the accuracy of the note.    I, Dr. Toni Sow personally performed the services described in this documentation. All medical record entries made by the scribe were at my direction and in my presence.  I have reviewed the chart and agree that the record reflects my personal performance and is accurate and complete. Toni Sow MD.  3:04 AM 11/23/2019    DISCLAIMER: This note was prepared with Dragon NaturallySpeaking voice recognition transcription software. Garbled syntax, mangled pronouns, and other bizarre constructions may be attributed to that software system           ED Course as of Nov 23 0303   Sat Nov 23, 2019   0036 ChestX-ray does not show an acute cardiopulmonary process.    [JS]   0256 I spoke with Dr. El about the patient earlier.  She will admit for sepsis with urinary tract infection.  Patient also appears to be dehydrated.    [JS]      ED Course User Index  [JS] Toni Sow MD                Clinical Impression:       ICD-10-CM ICD-9-CM   1. Sepsis due to urinary tract infection A41.9 038.9    N39.0 995.91     599.0   2. Tachycardia R00.0 785.0                             Toni Sow MD  11/23/19 0304       Toni Sow MD  11/23/19 0305

## 2019-11-23 NOTE — ASSESSMENT & PLAN NOTE
The patient has acute kidney failure which is probably secondary to decreased oral intake.  He does not have urinary retention.  He will receive IV fluids and we will check his kidney function again in 24 hrs.

## 2019-11-23 NOTE — PLAN OF CARE
11/23/19 1010   PRE-TX-O2   O2 Device (Oxygen Therapy) room air   SpO2 98 %   Pulse Oximetry Type Intermittent   $ Pulse Oximetry - Multiple Charge Pulse Oximetry - Multiple

## 2019-11-24 LAB
ANION GAP SERPL CALC-SCNC: 10 MMOL/L (ref 8–16)
BUN SERPL-MCNC: 36 MG/DL (ref 8–23)
CALCIUM SERPL-MCNC: 8.5 MG/DL (ref 8.7–10.5)
CHLORIDE SERPL-SCNC: 106 MMOL/L (ref 95–110)
CO2 SERPL-SCNC: 26 MMOL/L (ref 23–29)
CREAT SERPL-MCNC: 1.2 MG/DL (ref 0.5–1.4)
ERYTHROCYTE [DISTWIDTH] IN BLOOD BY AUTOMATED COUNT: 13.6 % (ref 11.5–14.5)
EST. GFR  (AFRICAN AMERICAN): >60 ML/MIN/1.73 M^2
EST. GFR  (NON AFRICAN AMERICAN): 58 ML/MIN/1.73 M^2
GLUCOSE SERPL-MCNC: 103 MG/DL (ref 70–110)
HCT VFR BLD AUTO: 35.4 % (ref 40–54)
HGB BLD-MCNC: 11.2 G/DL (ref 14–18)
MCH RBC QN AUTO: 31.2 PG (ref 27–31)
MCHC RBC AUTO-ENTMCNC: 31.6 G/DL (ref 32–36)
MCV RBC AUTO: 99 FL (ref 82–98)
PLATELET # BLD AUTO: 208 K/UL (ref 150–350)
PMV BLD AUTO: 10.8 FL (ref 9.2–12.9)
POTASSIUM SERPL-SCNC: 3.4 MMOL/L (ref 3.5–5.1)
RBC # BLD AUTO: 3.59 M/UL (ref 4.6–6.2)
SODIUM SERPL-SCNC: 142 MMOL/L (ref 136–145)
WBC # BLD AUTO: 12.75 K/UL (ref 3.9–12.7)

## 2019-11-24 PROCEDURE — 80048 BASIC METABOLIC PNL TOTAL CA: CPT

## 2019-11-24 PROCEDURE — 25000003 PHARM REV CODE 250: Performed by: INTERNAL MEDICINE

## 2019-11-24 PROCEDURE — 36415 COLL VENOUS BLD VENIPUNCTURE: CPT

## 2019-11-24 PROCEDURE — 94761 N-INVAS EAR/PLS OXIMETRY MLT: CPT

## 2019-11-24 PROCEDURE — 63600175 PHARM REV CODE 636 W HCPCS: Performed by: INTERNAL MEDICINE

## 2019-11-24 PROCEDURE — 25000003 PHARM REV CODE 250: Performed by: FAMILY MEDICINE

## 2019-11-24 PROCEDURE — 63600175 PHARM REV CODE 636 W HCPCS: Performed by: NURSE PRACTITIONER

## 2019-11-24 PROCEDURE — 85027 COMPLETE CBC AUTOMATED: CPT

## 2019-11-24 PROCEDURE — 25000003 PHARM REV CODE 250: Performed by: NURSE PRACTITIONER

## 2019-11-24 PROCEDURE — 12000002 HC ACUTE/MED SURGE SEMI-PRIVATE ROOM

## 2019-11-24 RX ORDER — LORAZEPAM 0.5 MG/1
TABLET ORAL
Status: DISPENSED
Start: 2019-11-24 | End: 2019-11-25

## 2019-11-24 RX ORDER — LORAZEPAM 0.5 MG/1
0.5 TABLET ORAL ONCE
Status: COMPLETED | OUTPATIENT
Start: 2019-11-24 | End: 2019-11-24

## 2019-11-24 RX ADMIN — CARBIDOPA AND LEVODOPA 2 TABLET: 25; 100 TABLET ORAL at 09:11

## 2019-11-24 RX ADMIN — ENOXAPARIN SODIUM 30 MG: 100 INJECTION SUBCUTANEOUS at 09:11

## 2019-11-24 RX ADMIN — OXYCODONE AND ACETAMINOPHEN 1 TABLET: 5; 325 TABLET ORAL at 11:11

## 2019-11-24 RX ADMIN — CEFTRIAXONE 1 G: 1 INJECTION, SOLUTION INTRAVENOUS at 01:11

## 2019-11-24 RX ADMIN — Medication 6 MG: at 09:11

## 2019-11-24 RX ADMIN — POTASSIUM CHLORIDE 40 MEQ: 20 SOLUTION ORAL at 11:11

## 2019-11-24 RX ADMIN — CARBIDOPA AND LEVODOPA 2 TABLET: 25; 100 TABLET ORAL at 05:11

## 2019-11-24 RX ADMIN — SODIUM CHLORIDE: 0.9 INJECTION, SOLUTION INTRAVENOUS at 11:11

## 2019-11-24 RX ADMIN — SENNOSIDES AND DOCUSATE SODIUM 1 TABLET: 8.6; 5 TABLET ORAL at 09:11

## 2019-11-24 RX ADMIN — CLONAZEPAM 1.5 MG: 0.5 TABLET ORAL at 09:11

## 2019-11-24 RX ADMIN — POLYETHYLENE GLYCOL (3350) 17 G: 17 POWDER, FOR SOLUTION ORAL at 09:11

## 2019-11-24 RX ADMIN — LORAZEPAM 0.5 MG: 0.5 TABLET ORAL at 05:11

## 2019-11-24 RX ADMIN — BISACODYL 10 MG: 5 TABLET, COATED ORAL at 01:11

## 2019-11-24 RX ADMIN — CARBIDOPA AND LEVODOPA 2 TABLET: 25; 100 TABLET ORAL at 02:11

## 2019-11-24 RX ADMIN — CARBIDOPA AND LEVODOPA 2 TABLET: 25; 100 TABLET ORAL at 01:11

## 2019-11-24 NOTE — NURSING
"Dr ruggiero notified of pt with increased anxiety trying to get out of bed. Pt states "I gotta get out this room"    Order obtained for 1x dose of ativan 0.5mg PO  "

## 2019-11-24 NOTE — PROGRESS NOTES
[facility] Hospital Medicine  Progress Note    Patient Name: Toni Kamara  MRN: [id number]  Patient Class: IP- Inpatient   Admission Date: 11/22/2019  Length of Stay: 1 days  Attending Physician: Naseem Shah MD  Primary Care Provider: Harinder Stout MD        Subjective:     Principal Problem:UTI (urinary tract infection)        HPI:  The patient is a 79-year-old gentleman who presented to the emergency room with fever.  His blood work reveals leukocytosis, acute renal failure and an abnormal urinalysis.  The patient is a very poor historian and there are no family members at the bedside.  He denies having fever, nausea, flank pain or dysuria.  He was admitted to [facility] about a week and a half ago and received treatment for dehydration and a urinary tract infection.  He was discharged home on Ceftin.  I was unable to find urine culture results.    Overview/Hospital Course:  No notes on file    Interval History: Patient currently eating in bed. No complaints.    Review of Systems   Constitutional: Negative for chills and fever.   Respiratory: Negative for shortness of breath.    Cardiovascular: Negative for chest pain.   Gastrointestinal: Negative for abdominal pain, nausea and vomiting.   Genitourinary: Negative for dysuria.   Skin: Negative for rash.   Neurological: Negative for headaches.     Objective:     Vital Signs (Most Recent):  Temp: 97.1 °F (36.2 °C) (11/24/19 1256)  Pulse: (!) 121 (11/24/19 1256)  Resp: 20 (11/24/19 1256)  BP: (!) 149/72 (11/24/19 1256)  SpO2: 99 % (11/24/19 1256) Vital Signs (24h Range):  Temp:  [96.1 °F (35.6 °C)-100.8 °F (38.2 °C)] 97.1 °F (36.2 °C)  Pulse:  [] 121  Resp:  [18-20] 20  SpO2:  [92 %-99 %] 99 %  BP: (112-151)/(55-75) 149/72     Weight: 56.7 kg (125 lb)  Body mass index is 20.18 kg/m².    Intake/Output Summary (Last 24 hours) at 11/24/2019 9202  Last data filed at 11/24/2019 0115  Gross per 24 hour   Intake 50 ml   Output --   Net 50 ml       Physical Exam   Constitutional: He appears well-developed and well-nourished.   HENT:   Head: Normocephalic and atraumatic.   Eyes: Conjunctivae are normal.   Cardiovascular: Normal rate and regular rhythm.   Pulmonary/Chest: Effort normal and breath sounds normal.   Abdominal: Soft. Bowel sounds are normal.   Neurological: He is alert.   Skin: Skin is warm.   Psychiatric: He has a normal mood and affect. His behavior is normal.       Significant Labs:   Recent Lab Results       11/24/19  0501        Anion Gap 10     BUN, Bld 36     Calcium 8.5     Chloride 106     CO2 26     Creatinine 1.2     eGFR if  >60     eGFR if non  58  Comment:  Calculation used to obtain the estimated glomerular filtration  rate (eGFR) is the CKD-EPI equation.        Glucose 103     Hematocrit 35.4     Hemoglobin 11.2     MCH 31.2     MCHC 31.6     MCV 99     MPV 10.8     Platelets 208     Potassium 3.4     RBC 3.59     RDW 13.6     Sodium 142     WBC 12.75               Assessment/Plan:      * UTI (urinary tract infection)  On rocephin.  F/u sensitivities       Tachycardia  Could be due to infection. Monitor for fever      STEWART (acute kidney injury)  Improved      Essential hypertension  Continue to monitor    Parkinson disease  Stable.  Continue home meds.      Anxiety  Given ativan once for anxiety. Will see how patient tolerates it       VTE Risk Mitigation (From admission, onward)         Ordered     enoxaparin injection 30 mg  Daily      11/23/19 2348     Place sequential compression device  Until discontinued      11/23/19 2348     Place ANDRADE hose  Until discontinued      11/23/19 2348     IP VTE HIGH RISK PATIENT  Once      11/23/19 2348                      Ana Miranda MD  Department of Hospital Medicine   [facility]

## 2019-11-24 NOTE — SUBJECTIVE & OBJECTIVE
Interval History: Patient currently eating in bed. No complaints.    Review of Systems   Constitutional: Negative for chills and fever.   Respiratory: Negative for shortness of breath.    Cardiovascular: Negative for chest pain.   Gastrointestinal: Negative for abdominal pain, nausea and vomiting.   Genitourinary: Negative for dysuria.   Skin: Negative for rash.   Neurological: Negative for headaches.     Objective:     Vital Signs (Most Recent):  Temp: 97.1 °F (36.2 °C) (11/24/19 1256)  Pulse: (!) 121 (11/24/19 1256)  Resp: 20 (11/24/19 1256)  BP: (!) 149/72 (11/24/19 1256)  SpO2: 99 % (11/24/19 1256) Vital Signs (24h Range):  Temp:  [96.1 °F (35.6 °C)-100.8 °F (38.2 °C)] 97.1 °F (36.2 °C)  Pulse:  [] 121  Resp:  [18-20] 20  SpO2:  [92 %-99 %] 99 %  BP: (112-151)/(55-75) 149/72     Weight: 56.7 kg (125 lb)  Body mass index is 20.18 kg/m².    Intake/Output Summary (Last 24 hours) at 11/24/2019 1734  Last data filed at 11/24/2019 0115  Gross per 24 hour   Intake 50 ml   Output --   Net 50 ml      Physical Exam   Constitutional: He appears well-developed and well-nourished.   HENT:   Head: Normocephalic and atraumatic.   Eyes: Conjunctivae are normal.   Cardiovascular: Normal rate and regular rhythm.   Pulmonary/Chest: Effort normal and breath sounds normal.   Abdominal: Soft. Bowel sounds are normal.   Neurological: He is alert.   Skin: Skin is warm.   Psychiatric: He has a normal mood and affect. His behavior is normal.       Significant Labs:   Recent Lab Results       11/24/19  0501        Anion Gap 10     BUN, Bld 36     Calcium 8.5     Chloride 106     CO2 26     Creatinine 1.2     eGFR if  >60     eGFR if non  58  Comment:  Calculation used to obtain the estimated glomerular filtration  rate (eGFR) is the CKD-EPI equation.        Glucose 103     Hematocrit 35.4     Hemoglobin 11.2     MCH 31.2     MCHC 31.6     MCV 99     MPV 10.8     Platelets 208     Potassium 3.4     RBC  3.59     RDW 13.6     Sodium 142     WBC 12.75

## 2019-11-24 NOTE — PLAN OF CARE
Plan of care reviewed with pt. Personal sitter and avasys at bedside. Uneventful shift. Still no BM. IVF infusing. Bed locked and low. Call light in reach. Hourly rounding to ensure pt safety.

## 2019-11-24 NOTE — PLAN OF CARE
11/24/19 1023   PRE-TX-O2   O2 Device (Oxygen Therapy) room air   SpO2 96 %   Pulse Oximetry Type Intermittent   $ Pulse Oximetry - Multiple Charge Pulse Oximetry - Multiple

## 2019-11-25 PROBLEM — R00.0 TACHYCARDIA: Status: RESOLVED | Noted: 2019-11-23 | Resolved: 2019-11-25

## 2019-11-25 PROBLEM — B96.5 PSEUDOMONAS URINARY TRACT INFECTION: Status: ACTIVE | Noted: 2019-11-15

## 2019-11-25 PROBLEM — E43 SEVERE MALNUTRITION: Status: ACTIVE | Noted: 2019-11-25

## 2019-11-25 LAB
ANION GAP SERPL CALC-SCNC: 3 MMOL/L (ref 8–16)
BACTERIA UR CULT: ABNORMAL
BUN SERPL-MCNC: 26 MG/DL (ref 8–23)
CALCIUM SERPL-MCNC: 7.9 MG/DL (ref 8.7–10.5)
CHLORIDE SERPL-SCNC: 110 MMOL/L (ref 95–110)
CO2 SERPL-SCNC: 27 MMOL/L (ref 23–29)
CREAT SERPL-MCNC: 1 MG/DL (ref 0.5–1.4)
ERYTHROCYTE [DISTWIDTH] IN BLOOD BY AUTOMATED COUNT: 13.6 % (ref 11.5–14.5)
EST. GFR  (AFRICAN AMERICAN): >60 ML/MIN/1.73 M^2
EST. GFR  (NON AFRICAN AMERICAN): >60 ML/MIN/1.73 M^2
GLUCOSE SERPL-MCNC: 103 MG/DL (ref 70–110)
HCT VFR BLD AUTO: 32.6 % (ref 40–54)
HGB BLD-MCNC: 10.5 G/DL (ref 14–18)
MCH RBC QN AUTO: 31.3 PG (ref 27–31)
MCHC RBC AUTO-ENTMCNC: 32.2 G/DL (ref 32–36)
MCV RBC AUTO: 97 FL (ref 82–98)
PLATELET # BLD AUTO: 207 K/UL (ref 150–350)
PMV BLD AUTO: 10.3 FL (ref 9.2–12.9)
POTASSIUM SERPL-SCNC: 3.7 MMOL/L (ref 3.5–5.1)
RBC # BLD AUTO: 3.36 M/UL (ref 4.6–6.2)
SODIUM SERPL-SCNC: 140 MMOL/L (ref 136–145)
WBC # BLD AUTO: 7.56 K/UL (ref 3.9–12.7)

## 2019-11-25 PROCEDURE — 25000003 PHARM REV CODE 250: Performed by: INTERNAL MEDICINE

## 2019-11-25 PROCEDURE — 12000002 HC ACUTE/MED SURGE SEMI-PRIVATE ROOM

## 2019-11-25 PROCEDURE — 36415 COLL VENOUS BLD VENIPUNCTURE: CPT

## 2019-11-25 PROCEDURE — 63600175 PHARM REV CODE 636 W HCPCS: Performed by: HOSPITALIST

## 2019-11-25 PROCEDURE — 63600175 PHARM REV CODE 636 W HCPCS: Performed by: INTERNAL MEDICINE

## 2019-11-25 PROCEDURE — 97803 MED NUTRITION INDIV SUBSEQ: CPT

## 2019-11-25 PROCEDURE — 85027 COMPLETE CBC AUTOMATED: CPT

## 2019-11-25 PROCEDURE — 94761 N-INVAS EAR/PLS OXIMETRY MLT: CPT

## 2019-11-25 PROCEDURE — 25000003 PHARM REV CODE 250: Performed by: NURSE PRACTITIONER

## 2019-11-25 PROCEDURE — 80048 BASIC METABOLIC PNL TOTAL CA: CPT

## 2019-11-25 PROCEDURE — 63600175 PHARM REV CODE 636 W HCPCS: Performed by: NURSE PRACTITIONER

## 2019-11-25 RX ORDER — ENOXAPARIN SODIUM 100 MG/ML
40 INJECTION SUBCUTANEOUS DAILY
Status: DISCONTINUED | OUTPATIENT
Start: 2019-11-26 | End: 2019-11-26 | Stop reason: HOSPADM

## 2019-11-25 RX ORDER — CEFEPIME HYDROCHLORIDE 1 G/50ML
1 INJECTION, SOLUTION INTRAVENOUS
Status: DISCONTINUED | OUTPATIENT
Start: 2019-11-25 | End: 2019-11-26

## 2019-11-25 RX ADMIN — CLONAZEPAM 1.5 MG: 0.5 TABLET ORAL at 09:11

## 2019-11-25 RX ADMIN — CARBIDOPA AND LEVODOPA 2 TABLET: 25; 100 TABLET ORAL at 06:11

## 2019-11-25 RX ADMIN — CARBIDOPA AND LEVODOPA 2 TABLET: 25; 100 TABLET ORAL at 09:11

## 2019-11-25 RX ADMIN — CARBIDOPA AND LEVODOPA 2 TABLET: 25; 100 TABLET ORAL at 02:11

## 2019-11-25 RX ADMIN — CARBIDOPA AND LEVODOPA 2 TABLET: 25; 100 TABLET ORAL at 01:11

## 2019-11-25 RX ADMIN — CARBIDOPA AND LEVODOPA 2 TABLET: 25; 100 TABLET ORAL at 05:11

## 2019-11-25 RX ADMIN — SENNOSIDES AND DOCUSATE SODIUM 1 TABLET: 8.6; 5 TABLET ORAL at 09:11

## 2019-11-25 RX ADMIN — POLYETHYLENE GLYCOL (3350) 17 G: 17 POWDER, FOR SOLUTION ORAL at 09:11

## 2019-11-25 RX ADMIN — CEFTRIAXONE 1 G: 1 INJECTION, SOLUTION INTRAVENOUS at 01:11

## 2019-11-25 RX ADMIN — ENOXAPARIN SODIUM 30 MG: 100 INJECTION SUBCUTANEOUS at 09:11

## 2019-11-25 RX ADMIN — OXYCODONE AND ACETAMINOPHEN 1 TABLET: 5; 325 TABLET ORAL at 03:11

## 2019-11-25 RX ADMIN — CEFEPIME HYDROCHLORIDE 1 G: 1 INJECTION, SOLUTION INTRAVENOUS at 09:11

## 2019-11-25 NOTE — PLAN OF CARE
11/25/19 0745   Patient Assessment/Suction   Level of Consciousness (AVPU) alert   PRE-TX-O2   O2 Device (Oxygen Therapy) room air   SpO2 (!) 94 %   Pulse Oximetry Type Intermittent   $ Pulse Oximetry - Multiple Charge Pulse Oximetry - Multiple   Pulse 75   Resp 16

## 2019-11-25 NOTE — PROGRESS NOTES
"  [facility]  Adult Nutrition  Consult Note    SUMMARY   Interventions: Modified texture-dysphagia soft. Nutrition education-wt gain    Recommendations    1. Continue regular diet with texture per SLP (dysphagia-soft-level 6)  2. Assistance with meals as needed   3. Please obtain actual weight and measured height.     Goals: Meet > 85% EEN daily  Nutrition Goal Status: continues  Communication of RD Recs: (POC, sticky note)    Reason for Assessment    Reason For Assessment: consult  Diagnosis: (UTI)  Relevant Medical History: Parkinson's  Interdisciplinary Rounds: did not attend    General Information Comments:   11/25/19:  Pt appears alert, but unable to understand his communication. Sitter at bedside, states she makes his meals at home. Notes he eats well at home. Pt appears cachetic.  Wt stable for the last 6 months.  Underweight for age.  NFPE completed. Severe wasting noted.  Education re weight gain tips given to sitter.     Nutrition Discharge Planning: Cardiac with texture per SLP    Nutrition Risk Screen    Nutrition Risk Screen: dysphagia or difficulty swallowing    Nutrition/Diet History    Spiritual, Cultural Beliefs, Methodist Practices, Values that Affect Care: yes(Moravian)  Factors Affecting Nutritional Intake: difficulty/impaired swallowing    Anthropometrics    Temp: 97.6 °F (36.4 °C)  Height Method: Stated  Height: 5' 6" (167.6 cm)  Height (inches): 66 in  Weight Method: Stated  Weight: 56.7 kg (125 lb)  Weight (lb): 125 lb  Ideal Body Weight (IBW), Male: 142 lb  % Ideal Body Weight, Male (lb): 88.03 lb  BMI (Calculated): 20.2  BMI Grade: 18.5-24.9 - normal  Weight 8/16/19: 55 kg,  5/16/19: 55.8 kg       Lab/Procedures/Meds    Pertinent Labs Reviewed: reviewed  Pertinent Labs Comments:  BMP  Lab Results   Component Value Date     11/25/2019    K 3.7 11/25/2019     11/25/2019    CO2 27 11/25/2019    BUN 26 (H) 11/25/2019    CREATININE 1.0 11/25/2019    CALCIUM 7.9 (L) " 11/25/2019    ANIONGAP 3 (L) 11/25/2019    ESTGFRAFRICA >60 11/25/2019    EGFRNONAA >60 11/25/2019     Lab Results   Component Value Date    ALBUMIN 3.0 (L) 11/22/2019       Pertinent Medications Reviewed: reviewed  Pertinent Medications Comments: Abx, senna, KCl    Estimated/Assessed Needs    Weight Used For Calorie Calculations: 56.7 kg (125 lb)  Energy Calorie Requirements (kcal): 6679-9214 kcal (x 1.2-1.3)  Energy Need Method: [place]-St Jeor  Protein Requirements: 68g (x 1.2g/kg)  Weight Used For Protein Calculations: 56.7 kg (125 lb)     Estimated Fluid Requirement Method: RDA Method  RDA Method (mL): 1500    Nutrition Prescription Ordered    Current Diet Order: Dysphagia Soft (level 6)    Evaluation of Received Nutrient/Fluid Intake      Energy Calories Required:not met  Protein Required:not met  Fluid Required: (per MD)  IVF @ 100 mls/hr  % Meal Intake: 25-50%    Nutrition Risk    Level of Risk/Frequency of Follow-up: (2 x week)     Assessment and Plan    Severe malnutrition  [x]  Unprioritized   Change Dx Resolve      Details  Code: E43  Noted: 11/25/2019  Updated: Today   Shellie Oconnell RD       Overview    Current Assessment & Plan Note  Edited:  Shellie Oconnell RD  Today  Contributing Nutrition Diagnosis  Severe malnutrition in chronic illness     Related to (etiology):   Unknown etiology     Signs and Symptoms (as evidenced by):   1) Severe fat wasting in upper arms and orbital region.   2) Severe muscle wasting bilateral temples, cheek, hand, clavicle, shoulder. Moderate wasting in patella and thighs.      Interventions/Recommendations (treatment strategy):  See RD note     Nutrition Diagnosis Status:   New                      Nutrition Problem  Difficulty swallowing    Related to (etiology):   Parkinson's    Signs and Symptoms (as evidenced by):   Difficulty swallowing pills; Dysphagia Soft diet ordered    Interventions  Collaboration with providers    Nutrition Diagnosis Status:   Continues      Monitor and Evaluation    Food and Nutrient Intake: energy intake, food and beverage intake  Food and Nutrient Adminstration: diet order  Anthropometric Measurements: weight, weight change  Biochemical Data, Medical Tests and Procedures: electrolyte and renal panel  Nutrition-Focused Physical Findings: overall appearance     Malnutrition Assessment     NFPE completed: Fat wasting in upper arms and orbital region.  Severe muscle wasting bilateral temples, cheek, hand, clavicle, shoulder. Moderate wasting in patella and thighs.        Nutrition Follow-Up    RD Follow-up?: Yes

## 2019-11-25 NOTE — PLAN OF CARE
Met with pt to initiate his assessment and called pt's daughter Bailey, [phone] to complete the assessment.  Bailey states that she has limited MPOA for pt and co signs his checks.  She stated that plan is for them to meet with pt and pt's neurologist next week to discuss her being pt's MPOA and FPOA.  Pt, who needs assistance with bathing, lives alone in his home however has 24/7 CG's and receives home health services with [facility].  Pt has a rolling walker and hospital bed at home.  Pt's PCP is Dr. Harinder Stout, neurologist is Dr. Barrientos in [place] and insurance is N.   Pt's discharge disposition is to return home with [facility].  Obtained verbal consent for the disclosure form.      CM receives consult from pt's nurse stating that one of pt's CG Amy reported that it is difficult for one CG to care for pt.  I addressed that concerns with pt's daughter Bailey who stated she would talk to the CG's to address their concerns.  Bailey stated that the sitter would not be available for sitting with pt his Wednesday to Friday and is requesting to know when pt will be discharged.  I provided her verbal information on sitter agencies and Bailey stated that she could google their names however she was not sure she trusted the agencies.  Informed her that they were bonded and encouraged her to call and talk to them.  Bailey said her family could take turns providing care for those two days if needed.  Spoke to Dr. Walton who stated pt's discharge may be tomorrow or Wednesday.  I left a voice message to update Bailey.        11/25/19 4904   Discharge Assessment   Assessment Type Discharge Planning Assessment   Confirmed/corrected address and phone number on facesheet? Yes   Assessment information obtained from? Patient;Other  (Pt at bedside and pt's daughter Bailey by phone.)   Prior to hospitilization cognitive status: Alert/Oriented   Prior to hospitalization functional status:  Needs Assistance;Assistive Equipment   Current cognitive status: Alert/Oriented   Current Functional Status: Needs Assistance   Lives With alone;other (see comments)  (Alone [facility] 24/7 sitters. )   Able to Return to Prior Arrangements yes   Is patient able to care for self after discharge? No   Who are your caregiver(s) and their phone number(s)?   (daughter mayuri Bass, [phone])   Patient's perception of discharge disposition home health   Readmission Within the Last 30 Days previous discharge plan unsuccessful   If yes, most recent facility name:   ([facility])   Patient currently being followed by outpatient case management? No   Patient currently receives any other outside agency services? Yes   How many hours a day does the patient receive services?   (private sitters 24/7)   Name and contact number of agency or person providing outside services   ([facility])   Equipment Currently Used at Home hospital bed;walker, rolling   Do you have any problems affording any of your prescribed medications? No  (pharmacy is [facility])   Is the patient taking medications as prescribed? yes   Does the patient have transportation home? Yes   Transportation Anticipated family or friend will provide   Does the patient receive services at the [facility]? No   Discharge Plan A Home Health   DME Needed Upon Discharge  none   Patient/Family in Agreement with Plan yes

## 2019-11-25 NOTE — PLAN OF CARE
Interventions: Modified texture-dysphagia soft. Nutrition education-wt gain    Recommendations    1. Continue regular diet with texture per SLP (dysphagia-soft-level 6)  2. Assistance with meals as needed   3. Please obtain actual weight and measured height.     Goals: Meet > 85% EEN daily  Nutrition Goal Status: continues  Communication of RD Recs: (POC, sticky note)

## 2019-11-25 NOTE — ASSESSMENT & PLAN NOTE
Contributing Nutrition Diagnosis  Severe malnutrition in chronic illness    Related to (etiology):   Unknown etiology    Signs and Symptoms (as evidenced by):   1) Severe fat wasting in upper arms and orbital region.   2) Severe muscle wasting bilateral temples, cheek, hand, clavicle, shoulder. Moderate wasting in patella and thighs.     Interventions/Recommendations (treatment strategy):  See RD note    Nutrition Diagnosis Status:   New

## 2019-11-25 NOTE — PLAN OF CARE
Called ARNOLDO and Angelique stated pt's current home health is with [facility].      11/25/19 9130   Discharge Reassessment   Assessment Type Discharge Planning Reassessment

## 2019-11-25 NOTE — UM SECONDARY REVIEW
RENETTA received a call from Radha with N requesting a downgrade to obs. RENETTA sent secure message to Dr. Walton, he wants to keep LOC inpt and will do peer to peer if necessary. RENETTA notified Radha, she will send to medical director and send to MRU if needed. RENETTA provided Dr. Walton's contact number in the event peer to peer is needed.

## 2019-11-25 NOTE — PROGRESS NOTES
Pharmacist Renal Dose Adjustment Note    Toni Kamara is a 79 y.o. male being treated with the medication Lovenox.    Patient Data:    Vital Signs (Most Recent):  Temp: 97.2 °F (36.2 °C) (11/25/19 0900)  Pulse: 68 (11/25/19 0900)  Resp: 16 (11/25/19 0900)  BP: 119/60 (11/25/19 0900)  SpO2: 95 % (11/25/19 0900) Vital Signs (72h Range):  Temp:  [96.1 °F (35.6 °C)-102.1 °F (38.9 °C)]   Pulse:  []   Resp:  [16-22]   BP: (112-165)/()   SpO2:  [92 %-100 %]      Recent Labs   Lab 11/22/19  2351 11/24/19  0501 11/25/19  0457   CREATININE 1.8* 1.2 1.0     Serum creatinine: 1 mg/dL 11/25/19 0457  Estimated creatinine clearance: 48.8 mL/min  BMI < 40    Lovenox 30 mg q24h will be changed to Lovenox 40 mg q24h per renal protocol.    Pharmacist's Name: Radha Alexander

## 2019-11-26 ENCOUNTER — TELEPHONE (OUTPATIENT)
Dept: FAMILY MEDICINE | Facility: CLINIC | Age: 79
End: 2019-11-26

## 2019-11-26 VITALS
HEART RATE: 79 BPM | DIASTOLIC BLOOD PRESSURE: 53 MMHG | TEMPERATURE: 96 F | HEIGHT: 66 IN | OXYGEN SATURATION: 97 % | WEIGHT: 125 LBS | BODY MASS INDEX: 20.09 KG/M2 | SYSTOLIC BLOOD PRESSURE: 117 MMHG | RESPIRATION RATE: 20 BRPM

## 2019-11-26 LAB
ANION GAP SERPL CALC-SCNC: 6 MMOL/L (ref 8–16)
BUN SERPL-MCNC: 23 MG/DL (ref 8–23)
CALCIUM SERPL-MCNC: 8.2 MG/DL (ref 8.7–10.5)
CHLORIDE SERPL-SCNC: 108 MMOL/L (ref 95–110)
CO2 SERPL-SCNC: 27 MMOL/L (ref 23–29)
CREAT SERPL-MCNC: 1.1 MG/DL (ref 0.5–1.4)
ERYTHROCYTE [DISTWIDTH] IN BLOOD BY AUTOMATED COUNT: 13.7 % (ref 11.5–14.5)
EST. GFR  (AFRICAN AMERICAN): >60 ML/MIN/1.73 M^2
EST. GFR  (NON AFRICAN AMERICAN): >60 ML/MIN/1.73 M^2
GLUCOSE SERPL-MCNC: 105 MG/DL (ref 70–110)
HCT VFR BLD AUTO: 33.4 % (ref 40–54)
HGB BLD-MCNC: 11 G/DL (ref 14–18)
MCH RBC QN AUTO: 31.9 PG (ref 27–31)
MCHC RBC AUTO-ENTMCNC: 32.9 G/DL (ref 32–36)
MCV RBC AUTO: 97 FL (ref 82–98)
PLATELET # BLD AUTO: 232 K/UL (ref 150–350)
PMV BLD AUTO: 11 FL (ref 9.2–12.9)
POTASSIUM SERPL-SCNC: 4 MMOL/L (ref 3.5–5.1)
RBC # BLD AUTO: 3.45 M/UL (ref 4.6–6.2)
SODIUM SERPL-SCNC: 141 MMOL/L (ref 136–145)
WBC # BLD AUTO: 6.15 K/UL (ref 3.9–12.7)

## 2019-11-26 PROCEDURE — 97535 SELF CARE MNGMENT TRAINING: CPT

## 2019-11-26 PROCEDURE — 97165 OT EVAL LOW COMPLEX 30 MIN: CPT

## 2019-11-26 PROCEDURE — 63600175 PHARM REV CODE 636 W HCPCS: Performed by: HOSPITALIST

## 2019-11-26 PROCEDURE — 36415 COLL VENOUS BLD VENIPUNCTURE: CPT

## 2019-11-26 PROCEDURE — 25000003 PHARM REV CODE 250: Performed by: INTERNAL MEDICINE

## 2019-11-26 PROCEDURE — 97116 GAIT TRAINING THERAPY: CPT

## 2019-11-26 PROCEDURE — G8988 SELF CARE GOAL STATUS: HCPCS | Mod: CL

## 2019-11-26 PROCEDURE — G8987 SELF CARE CURRENT STATUS: HCPCS | Mod: CL

## 2019-11-26 PROCEDURE — 25000003 PHARM REV CODE 250: Performed by: NURSE PRACTITIONER

## 2019-11-26 PROCEDURE — 97530 THERAPEUTIC ACTIVITIES: CPT

## 2019-11-26 PROCEDURE — 25000003 PHARM REV CODE 250: Performed by: HOSPITALIST

## 2019-11-26 PROCEDURE — 80048 BASIC METABOLIC PNL TOTAL CA: CPT

## 2019-11-26 PROCEDURE — 97161 PT EVAL LOW COMPLEX 20 MIN: CPT

## 2019-11-26 PROCEDURE — 85027 COMPLETE CBC AUTOMATED: CPT

## 2019-11-26 PROCEDURE — 94761 N-INVAS EAR/PLS OXIMETRY MLT: CPT

## 2019-11-26 RX ORDER — CIPROFLOXACIN 500 MG/1
500 TABLET ORAL 2 TIMES DAILY
Qty: 14 TABLET | Refills: 0 | Status: ON HOLD | OUTPATIENT
Start: 2019-11-26 | End: 2019-12-03 | Stop reason: HOSPADM

## 2019-11-26 RX ORDER — CIPROFLOXACIN 500 MG/1
500 TABLET ORAL EVERY 12 HOURS
Status: DISCONTINUED | OUTPATIENT
Start: 2019-11-26 | End: 2019-11-26

## 2019-11-26 RX ORDER — CIPROFLOXACIN 500 MG/1
500 TABLET ORAL EVERY 12 HOURS
Status: COMPLETED | OUTPATIENT
Start: 2019-11-26 | End: 2019-11-26

## 2019-11-26 RX ADMIN — POLYETHYLENE GLYCOL (3350) 17 G: 17 POWDER, FOR SOLUTION ORAL at 10:11

## 2019-11-26 RX ADMIN — ENOXAPARIN SODIUM 40 MG: 100 INJECTION SUBCUTANEOUS at 10:11

## 2019-11-26 RX ADMIN — CARBIDOPA AND LEVODOPA 2 TABLET: 25; 100 TABLET ORAL at 10:11

## 2019-11-26 RX ADMIN — OXYCODONE AND ACETAMINOPHEN 1 TABLET: 5; 325 TABLET ORAL at 05:11

## 2019-11-26 RX ADMIN — CEFEPIME HYDROCHLORIDE 1 G: 1 INJECTION, SOLUTION INTRAVENOUS at 10:11

## 2019-11-26 RX ADMIN — ACETAMINOPHEN 1000 MG: 500 TABLET ORAL at 01:11

## 2019-11-26 RX ADMIN — ACETAMINOPHEN 1000 MG: 500 TABLET ORAL at 03:11

## 2019-11-26 RX ADMIN — CARBIDOPA AND LEVODOPA 2 TABLET: 25; 100 TABLET ORAL at 05:11

## 2019-11-26 RX ADMIN — CIPROFLOXACIN HYDROCHLORIDE 500 MG: 500 TABLET, FILM COATED ORAL at 01:11

## 2019-11-26 RX ADMIN — CARBIDOPA AND LEVODOPA 2 TABLET: 25; 100 TABLET ORAL at 01:11

## 2019-11-26 RX ADMIN — SENNOSIDES AND DOCUSATE SODIUM 1 TABLET: 8.6; 5 TABLET ORAL at 10:11

## 2019-11-26 NOTE — PLAN OF CARE
Notified Angelique at [facility], [phone] option 3 that I had faxed her pt's home health order and pt had [facility] prior to this admission.  Updated pt's nurse Pat and provided her with number to call [facility] for transportation home.       11/26/19 1246   Discharge Reassessment   Assessment Type Discharge Planning Reassessment   Discharge Plan A Home Health  (Nick)

## 2019-11-26 NOTE — PROGRESS NOTES
[facility] Hospital Medicine  Progress Note    Patient Name: Toni Kamara  MRN: [id number]  Patient Class: IP- Inpatient   Admission Date: 11/22/2019  Length of Stay: 2 days  Attending Physician: Macario Walton MD  Primary Care Provider: Harinder Stout MD        Subjective:     Principal Problem:Pseudomonas urinary tract infection        HPI:  The patient is a 79-year-old gentleman who presented to the emergency room with fever.  His blood work reveals leukocytosis, acute renal failure and an abnormal urinalysis.  The patient is a very poor historian and there are no family members at the bedside.  He denies having fever, nausea, flank pain or dysuria.  He was admitted to [facility] about a week and a half ago and received treatment for dehydration and a urinary tract infection.  He was discharged home on Ceftin.  I was unable to find urine culture results.    Overview/Hospital Course:  No notes on file    Interval History:  Patient seen and examined.  No acute events overnight.  Urinalysis came back positive for Pseudomonas.  Antibiotics switched to cefepime for appropriate coverage.    Review of Systems   Constitutional: Negative for chills, fatigue and fever.   Respiratory: Negative for cough and shortness of breath.    Cardiovascular: Negative for chest pain and leg swelling.   Gastrointestinal: Negative for abdominal pain, nausea and vomiting.   Musculoskeletal: Negative for back pain.   Neurological: Negative for weakness.   Psychiatric/Behavioral: Negative for confusion. The patient is not nervous/anxious.    All other systems reviewed and are negative.    Objective:     Vital Signs (Most Recent):  Temp: 96.9 °F (36.1 °C) (11/25/19 1209)  Pulse: 71 (11/25/19 1209)  Resp: 16 (11/25/19 1209)  BP: 126/71 (11/25/19 1209)  SpO2: 96 % (11/25/19 1209) Vital Signs (24h Range):  Temp:  [96.9 °F (36.1 °C)-98.1 °F (36.7 °C)] 96.9 °F (36.1 °C)  Pulse:  [68-76] 71  Resp:  [16-18] 16  SpO2:  [93  %-96 %] 96 %  BP: (117-126)/(60-90) 126/71     Weight: 56.7 kg (125 lb)  Body mass index is 20.18 kg/m².    Intake/Output Summary (Last 24 hours) at 11/25/2019 1817  Last data filed at 11/25/2019 0410  Gross per 24 hour   Intake 1066.67 ml   Output --   Net 1066.67 ml      Physical Exam   Constitutional:   Frail, elderly  male in no acute distress   Eyes: Pupils are equal, round, and reactive to light. EOM are normal.   Neck: No JVD present.   Cardiovascular: Normal rate, regular rhythm, normal heart sounds and intact distal pulses.   Pulmonary/Chest: Effort normal and breath sounds normal.   Abdominal: Soft. Bowel sounds are normal. He exhibits no distension. There is no tenderness.   Musculoskeletal: He exhibits no edema or deformity.   Lymphadenopathy:     He has no cervical adenopathy.   Skin: No rash noted. No pallor.   Nursing note and vitals reviewed.      Significant Labs: All pertinent labs within the past 24 hours have been reviewed.    Significant Imaging: I have reviewed all pertinent imaging results/findings within the past 24 hours.      Assessment/Plan:      * Pseudomonas urinary tract infection  Patient with complicated UTI secondary to incontinence and Pseudomonas.  Increased antibiotics to cover for pseudomonal resistance patterns and follow-up with cultures.  Patient clinically appears to be improving.      STEWART (acute kidney injury)  Patient with STEWART likely d/t ATN  Which is currently improving. Labs reviewed- BMP with Estimated Creatinine Clearance: 48.8 mL/min (based on SCr of 1 mg/dL). according to latest data. Monitor UOP and serial BMP and adjust therapy as needed. Avoid nephrotoxins and renally dose meds for GFR listed above.      Severe malnutrition  Nutrition consulted. Body mass index is 20.18 kg/m².. Encourage maximal PO intake. Diet supplementation ordered per nutrition approval. Will encourage PO and monitor closely for weight changes.        Essential hypertension  Chronic,  controlled.  Latest blood pressure and vitals reviewed-   Temp:  [96.9 °F (36.1 °C)-98.1 °F (36.7 °C)]   Pulse:  [68-76]   Resp:  [16-18]   BP: (117-126)/(60-90)   SpO2:  [93 %-96 %] .   Home meds for hypertension were reviewed and noted below. Hospital anti-hypertensive changes were made as shown below.  Hypertension Medications             losartan (COZAAR) 50 MG tablet Take 1 tablet (50 mg total) by mouth once daily.        Currently holding Cozaar secondary to STEWART    Will utilize p.r.n. blood pressure medication only if patient's blood pressure greater than  180/110 and he develops symptoms such as worsening chest pain or shortness of breath.      Parkinson's disease dementia  Stable for now.  Continue Sinemet and monitor closely.  Will avoid Haldol for behavioral control given patient's history of Parkinson's. Continue home dementia meds and non-pharmacologic interventions to prevent delirium (No VS between 11PM-5AM, activity during day, opening blinds, providing glasses/hearing aids, and up in chair during daytime).        Anxiety  Will use p.r.n. anxiolytics.      VTE Risk Mitigation (From admission, onward)         Ordered     enoxaparin injection 40 mg  Daily      11/25/19 1025     Place sequential compression device  Until discontinued      11/23/19 2348     Place ANDRADE hose  Until discontinued      11/23/19 2348     IP VTE HIGH RISK PATIENT  Once      11/23/19 2348                      Macario Walton MD  Department of Hospital Medicine   [facility]

## 2019-11-26 NOTE — TELEPHONE ENCOUNTER
----- Message from Letitia Palacio sent at 11/26/2019 11:19 AM CST -----  Contact: [facility]  calling pt being discharged and needing a 1 week follow up with the .[phone] (home)

## 2019-11-26 NOTE — PLAN OF CARE
Call to pt's daughter Bailey, [phone] to updated her of plan for pt to discharge today with [facility] and [facility].  She is requesting to speak to dr. Walton as she would prefer pt being discharged on a different antibiotic.  Updated Dr. Walton in secure chat.     Attempted to schedule the hospital follow up with Dr. Stout in one week.  Was informed their nurse would call pt with the appointment.      11/26/19 5050   Discharge Reassessment   Assessment Type Discharge Planning Reassessment   Discharge Plan A [facility]

## 2019-11-26 NOTE — ASSESSMENT & PLAN NOTE
Stable for now.  Continue Sinemet and monitor closely.  Will avoid Haldol for behavioral control given patient's history of Parkinson's. Continue home dementia meds and non-pharmacologic interventions to prevent delirium (No VS between 11PM-5AM, activity during day, opening blinds, providing glasses/hearing aids, and up in chair during daytime).

## 2019-11-26 NOTE — ASSESSMENT & PLAN NOTE
Patient with complicated UTI secondary to incontinence and Pseudomonas.  Increased antibiotics to cover for pseudomonal resistance patterns and follow-up with cultures.  Patient clinically appears to be improving.

## 2019-11-26 NOTE — PROGRESS NOTES
Pharmacy Discharge Medication Reconciliation Progress Note    The medication reconciliation was completed by Radha Alexander PharmD.  Based on information gathered and subsequent review, the medications upon admission were all accounted for upon discharge. Drug interactions were reviewed and no significant interactions were found. Feel free to contact me if you have any questions.     Radha Alexander PharmD  Pharmacy Resident  [phone]

## 2019-11-26 NOTE — PT/OT/SLP EVAL
Physical Therapy Evaluation    Patient Name:  Toni Kamara   MRN:  [id number]    Recommendations:     Discharge Recommendations:  home with home health, home, home health PT   Discharge Equipment Recommendations: none   Barriers to discharge: None    Assessment:     Toni Kamara is a 79 y.o. male admitted with a medical diagnosis of Pseudomonas urinary tract infection.  He presents with the following impairments/functional limitations:  weakness, impaired endurance, impaired balance, impaired functional mobilty, gait instability, decreased lower extremity function .  Patient readily agreeable to PT evaluation and gait training.  Patient able to transfer supine to sit with min assist and sit to stand with CGA.  Patient then able to ambulate x 250 feet with RW with CGA.  Patient is supposed to go home today and would benefit from  PT upon discharge    Rehab Prognosis: Good; patient would benefit from acute skilled PT services to address these deficits and reach maximum level of function.    Recent Surgery: * No surgery found *      Plan:     During this hospitalization, patient to be seen 6 x/week to address the identified rehab impairments via gait training, therapeutic activities, therapeutic exercises and progress toward the following goals:    · Plan of Care Expires:       Subjective     Chief Complaint: none given  Patient/Family Comments/goals: go home  Pain/Comfort:  ·      Patients cultural, spiritual, Worship conflicts given the current situation:      Living Environment:  Currently lives with family in [address] [place] home.   Prior to admission, patients level of function was min assist.  Equipment used at home: walker, rolling, rollator, hospital bed.  DME owned (not currently used): none.  Upon discharge, patient will have assistance from family.    Objective:     Communicated with nurse [place] prior to session.  Patient found supine with peripheral IV  upon PT entry to room.    General Precautions: Standard,  fall   Orthopedic Precautions:    Braces:       Exams:  · RLE ROM: WFL  · RLE Strength: Deficits: 4+/5 overall   · LLE ROM:  WFL  · LLE Strength: Deficits:  4+/5 overall    Functional Mobility:  · Bed Mobility:     · Supine to Sit: supervision  · Sit to Supine: supervision  · Transfers:     · Sit to Stand:  contact guard assistance with rolling walker  · Gait: 250 feet with RW with min assist.      Therapeutic Activities and Exercises:   none given    AM-PAC 6 CLICK MOBILITY  Total Score:18     Patient left supine with call button in reach and bed alarm on.    GOALS:   Multidisciplinary Problems     Physical Therapy Goals        Problem: Physical Therapy Goal    Goal Priority Disciplines Outcome Goal Variances Interventions   Physical Therapy Goal     PT, PT/OT Ongoing, Progressing     Description:  Goals to be met by: 19    Patient will increase functional independence with mobility by performin. Supine to sit with Set-up [place]  2. Sit to supine with Set-up [place]  3. Sit to stand transfer with Supervision  4. Bed to chair transfer with Supervision using Rolling Walker  5. Gait  x 250 feet with Supervision using Rolling Walker.                       History:     Past Medical History:   Diagnosis Date    Anxiety 2012    Arthritis of knee 2012    Back pain 2012    BPH (benign prostatic hyperplasia) 2012    Essential tremor 3/19/2014    Kyphoscoliosis 2012    Parkinson's disease        Past Surgical History:   Procedure Laterality Date    APPENDECTOMY      FRACTURE SURGERY Left Dec 18, 2014    HEMORRHOID SURGERY      PROSTATE SURGERY      TURP and had renal insu from obsr    TONSILLECTOMY         Time Tracking:     PT Received On: 19  PT Start Time: 1104     PT Stop Time: 1136  PT Total Time (min): 32 min     Billable Minutes: Evaluation 22 and Gait Training 10      Chris Megilligan, PT  2019

## 2019-11-26 NOTE — PLAN OF CARE
Pt's DEANN Tran, [phone] leaving pt's room.  Requests to be called when pt is ready to discharge home.      11/26/19 1223   Discharge Reassessment   Assessment Type Discharge Planning Reassessment   Discharge Plan A [facility]

## 2019-11-26 NOTE — PLAN OF CARE
Pt caregivers at bedside throughout this shift. Pt assisted with meals and drinks. Pt takes pills whole 2 at a time without difficulty. Pt alert and oriented but is impulsive. Avasys in place due to impulsiveness and attempts to climb out of bed. Pt asks questions regarding his care and verbalizes understanding the answers. Pt denies pain throughout this shift. Call light in easy reach.

## 2019-11-26 NOTE — PLAN OF CARE
Patient AAO X 4. Patient free from injury during shift. Pain managed pharmacologically. Provided moderate relief. Patient free from N/V during shift. IV access has IV ABX running.  Patient placed on cardiac monitoring. Running sinus tachy. Remained afebrile during shift. Pt tolerating whole pills well. Suction at bedside Restroom offered. Repositioned as needed. Safety maintained with bed alarm. Bed in lowest position, call light and personal items within reach. Patient verbalized understanding of care. Will continue to monitor with every 2 hour rounding.

## 2019-11-26 NOTE — SUBJECTIVE & OBJECTIVE
Interval History:  Patient seen and examined.  No acute events overnight.  Urinalysis came back positive for Pseudomonas.  Antibiotics switched to cefepime for appropriate coverage.    Review of Systems   Constitutional: Negative for chills, fatigue and fever.   Respiratory: Negative for cough and shortness of breath.    Cardiovascular: Negative for chest pain and leg swelling.   Gastrointestinal: Negative for abdominal pain, nausea and vomiting.   Musculoskeletal: Negative for back pain.   Neurological: Negative for weakness.   Psychiatric/Behavioral: Negative for confusion. The patient is not nervous/anxious.    All other systems reviewed and are negative.    Objective:     Vital Signs (Most Recent):  Temp: 96.9 °F (36.1 °C) (11/25/19 1209)  Pulse: 71 (11/25/19 1209)  Resp: 16 (11/25/19 1209)  BP: 126/71 (11/25/19 1209)  SpO2: 96 % (11/25/19 1209) Vital Signs (24h Range):  Temp:  [96.9 °F (36.1 °C)-98.1 °F (36.7 °C)] 96.9 °F (36.1 °C)  Pulse:  [68-76] 71  Resp:  [16-18] 16  SpO2:  [93 %-96 %] 96 %  BP: (117-126)/(60-90) 126/71     Weight: 56.7 kg (125 lb)  Body mass index is 20.18 kg/m².    Intake/Output Summary (Last 24 hours) at 11/25/2019 1817  Last data filed at 11/25/2019 0410  Gross per 24 hour   Intake 1066.67 ml   Output --   Net 1066.67 ml      Physical Exam   Constitutional:   Frail, elderly  male in no acute distress   Eyes: Pupils are equal, round, and reactive to light. EOM are normal.   Neck: No JVD present.   Cardiovascular: Normal rate, regular rhythm, normal heart sounds and intact distal pulses.   Pulmonary/Chest: Effort normal and breath sounds normal.   Abdominal: Soft. Bowel sounds are normal. He exhibits no distension. There is no tenderness.   Musculoskeletal: He exhibits no edema or deformity.   Lymphadenopathy:     He has no cervical adenopathy.   Skin: No rash noted. No pallor.   Nursing note and vitals reviewed.      Significant Labs: All pertinent labs within the past 24 hours  have been reviewed.    Significant Imaging: I have reviewed all pertinent imaging results/findings within the past 24 hours.

## 2019-11-26 NOTE — ASSESSMENT & PLAN NOTE
Chronic, controlled.  Latest blood pressure and vitals reviewed-   Temp:  [96.9 °F (36.1 °C)-98.1 °F (36.7 °C)]   Pulse:  [68-76]   Resp:  [16-18]   BP: (117-126)/(60-90)   SpO2:  [93 %-96 %] .   Home meds for hypertension were reviewed and noted below. Hospital anti-hypertensive changes were made as shown below.  Hypertension Medications             losartan (COZAAR) 50 MG tablet Take 1 tablet (50 mg total) by mouth once daily.        Currently holding Cozaar secondary to STEWART    Will utilize p.r.n. blood pressure medication only if patient's blood pressure greater than  180/110 and he develops symptoms such as worsening chest pain or shortness of breath.

## 2019-11-26 NOTE — ASSESSMENT & PLAN NOTE
Patient with STEWART likely d/t ATN  Which is currently improving. Labs reviewed- BMP with Estimated Creatinine Clearance: 48.8 mL/min (based on SCr of 1 mg/dL). according to latest data. Monitor UOP and serial BMP and adjust therapy as needed. Avoid nephrotoxins and renally dose meds for GFR listed above.

## 2019-11-26 NOTE — PT/OT/SLP EVAL
"Occupational Therapy   Evaluation    Name: Toni Kamara  MRN: [id number]  Admitting Diagnosis:  Pseudomonas urinary tract infection      Recommendations:     Discharge Recommendations: home, home with home health, other (see comments)(and 24/7 supervision/assistance)  Discharge Equipment Recommendations:  none  Barriers to discharge:  None, Other (Comment)(Prior to admit, pt had 24/7 caregiver assistance which will be available upon discharge as well )    Assessment:     Toni Kamara is a 79 y.o. male with a medical diagnosis of Pseudomonas urinary tract infection.  He presents with highly motivated to mobilize.. Patient requires Min/Mod (A) bed mobility, Mod (A) sit<>stand and Mod (A) toilet transfer with use of RW and step by step verbal instruction for correct/safe transfer sequence/techniques and body positioning. Classic Parkinson's presentation with typical Parkinsonian gait pattern and tremors, posture and poor initiation. Performance deficits affecting function: weakness, impaired endurance, impaired self care skills, impaired functional mobilty, gait instability, impaired balance, decreased ROM, decreased coordination, decreased upper extremity function, decreased lower extremity function, decreased safety awareness.      Rehab Prognosis: Good; patient would benefit from acute skilled OT services to address these deficits and reach maximum level of function.       Plan:     Patient to be seen 2 x/week to address the above listed problems via self-care/home management, therapeutic activities  · Plan of Care Expires: 12/03/19  · Plan of Care Reviewed with: patient    Subjective     Chief Complaint: Feeling weak because he has been lying in bed for the past few days  Patient/Family Comments/goals: To walk; "I want to know how to get out of bed too because I never want to be stuck in bed."    Occupational Profile:  Living Environment: Patient lives in [facility] with walk-in shower; pt lives alone but has 24/7 " "caregivers/assistance   Previous level of function: Patient requires assistance with all bed mobility using hospital bed, functional mobility using rollator, and requires assistance to Supervision with all ADL; caregivers (A) with shower and toilet transfers - pt is able to bathe himself with close SBA and perform toileting with Mod (A) as he is able to perform hygiene but unable to manage clothing   Equipment Used at Home:  bedside commode, hospital bed, shower chair, rollator, other (see comments)(pt has walk-in shower )  Assistance upon Discharge: 24/7 caregiver assistance     Pain/Comfort:  · Pain Rating 1: 0/10(did not report pain; however, did complain of being "stiff" from lying in bed the past couple days)  · Pain Addressed 1: Reposition, Distraction  · Pain Rating Post-Intervention 1: 0/10    Patients cultural, spiritual, Christian conflicts given the current situation:      Objective:     Communicated with: Nurse prior to session.  Patient found HOB elevated with caregiver present bed alarm, peripheral IV, telemetry upon OT entry to room.    General Precautions: Standard, fall   Orthopedic Precautions:N/A   Braces: N/A     Occupational Performance:    Bed Mobility:    · Patient completed Rolling/Turning to Right with minimum assistance  · Patient completed Supine to Sit with minimum/moderate assistance  · Patient completed Sit to Supine with contact guard assistance    Functional Mobility/Transfers: Patient provided with step by step verbal instruction for correct positioning of LEs, proper hand placement prior to and throughout all transfers performed   · Patient completed Sit <> Stand Transfer with moderate assistance  with  rolling walker   · Patient completed Toilet Transfer Stand Pivot technique with moderate assistance with  rolling walker, bedside commode, and grab bar  · Functional Mobility: Patient walking with Min/Mod (A) using Rolling walker for approximately 15 ft total EOB <> Toilet with " verbal instruction and physical assistance for correct/safe walker management     Activities of Daily Living:  · Grooming: stand by assistance after Set-up of items  · Toileting: maximal assistance to manage clothing over hips and to perform hygiene - pt found with wet brief and from toilet level, pt cleaned and provided with clean brief    Cognitive/Visual Perceptual:  Cognitive/Psychosocial Skills:     -       Oriented to: Person, Place and Situation   -       Follows Commands/attention:Follows one-step commands  -       Communication: clear/fluent  -       Safety awareness/insight to disability: impaired     Physical Exam:  Postural examination/scapula alignment:    -       Rounded shoulders  -       Forward head  -       Posterior pelvic tilt  -       Kyphosis  Skin integrity: Visible skin intact  Motor Planning:    -       Impaired  Fine Motor Coordination:    -       Impaired  secondary to tremors  Gross motor coordination:   impaired secondary to tremors    AMPAC 6 Click ADL:  AMPAC Total Score: 12    Treatment & Education:  - OTR providing education/instruction regarding OT role/POC, safety awareness, fall prevention, correct positioning of LEs prior to attempting sit<>stand and OTR providing step by step instruction for positioning of bed and transitional movement sequence of supine > sit per pt request; pt able to provide verbal teach back of sequence and performing twice throughout session   Education:    Patient left HOB elevated with all lines intact, call button in reach, bed alarm on, nurse, Pat notified and caregiver present    GOALS:   Multidisciplinary Problems     Occupational Therapy Goals        Problem: Occupational Therapy Goal    Goal Priority Disciplines Outcome Interventions   Occupational Therapy Goal     OT, PT/OT Ongoing, Progressing    Description:  Goals to be met by: 12/3/19     Patient will increase functional independence with ADLs by performing:    Toileting from toilet with  Moderate Assistance for hygiene and clothing management.                      History:     Past Medical History:   Diagnosis Date    Anxiety 1/25/2012    Arthritis of knee 1/25/2012    Back pain 1/25/2012    BPH (benign prostatic hyperplasia) 1/25/2012    Essential tremor 3/19/2014    Kyphoscoliosis 1/25/2012    Parkinson's disease        Past Surgical History:   Procedure Laterality Date    APPENDECTOMY      FRACTURE SURGERY Left Dec 18, 2014    HEMORRHOID SURGERY      PROSTATE SURGERY  2008    TURP and had renal insu from obsr    TONSILLECTOMY         Time Tracking:     OT Date of Treatment: 11/26/19  OT Start Time: 0926  OT Stop Time: 1008  OT Total Time (min): 42 min    Billable Minutes:Evaluation 15  Self Care/Home Management 12  Therapeutic Activity 15    NATALY Gonzalez LOTR  11/26/2019

## 2019-11-26 NOTE — ASSESSMENT & PLAN NOTE
Nutrition consulted. Body mass index is 20.18 kg/m².. Encourage maximal PO intake. Diet supplementation ordered per nutrition approval. Will encourage PO and monitor closely for weight changes.

## 2019-11-26 NOTE — PLAN OF CARE
Problem: Occupational Therapy Goal  Goal: Occupational Therapy Goal  Description  Goals to be met by: 12/3/19     Patient will increase functional independence with ADLs by performing:    Toileting from toilet with Moderate Assistance for hygiene and clothing management.     Outcome: Ongoing, Progressing  OT POC initiated and established in collaboration with patient and his caregiver; pt's goals are not set for independent as patient requires assistance for all ADL and associated functional mobility prior to admit.

## 2019-11-26 NOTE — PLAN OF CARE
11/26/19 1250   Final Note   Assessment Type Final Discharge Note   Anticipated Discharge Disposition Home-Health  (tania)   Hospital Follow Up  Appt(s) scheduled? Yes

## 2019-11-26 NOTE — DISCHARGE INSTRUCTIONS
Thank you for choosing [facility] for your medical care. The primary doctor who is taking care of you at the time of your discharge is Macario Walton MD.     You were admitted to the hospital with Pseudomonas urinary tract infection.     Please note your discharge instructions, including diet/activity restrictions, follow-up appointments, and medication changes.  If you have any questions about your medical issues, prescriptions, or any other questions, please feel free to contact the [facility] Medicine Dept at [phone] and we will help.    If you are previously with Home health, outpatient PT/OT or under a therapy program, you are cleared to return to those programs.    Please direct all long term medication refills and follow up to your primary care provider, Harinder Stout MD. Thank you again for letting us take care of your health care needs.

## 2019-11-26 NOTE — PLAN OF CARE
Problem: Physical Therapy Goal  Goal: Physical Therapy Goal  Description  Goals to be met by: 19    Patient will increase functional independence with mobility by performin. Supine to sit with Set-up [place]  2. Sit to supine with Set-up [place]  3. Sit to stand transfer with Supervision  4. Bed to chair transfer with Supervision using Rolling Walker  5. Gait  x 250 feet with Supervision using Rolling Walker.      Outcome: Ongoing, Progressing

## 2019-11-27 NOTE — PROGRESS NOTES
Patient discharged this afternoon. IV'S x2 removed, telemetry  Monitor removed. Discharge paperwork given to patient's caregiver upon discgarhe. Medications reviewed with follow up appts.

## 2019-11-28 LAB
BACTERIA BLD CULT: NORMAL
BACTERIA BLD CULT: NORMAL

## 2019-11-28 NOTE — HOSPITAL COURSE
Patient is a 79-year-old with a history of Parkinson's disease dementia who presented the hospital with acute kidney injury secondary to urinary tract infection.  Urine culture came back positive for Pseudomonas.  Patient was initially on Rocephin which was changed to cefepime, however once patient's culture and sensitivities came back in his urine, it was found to be sensitive to ciprofloxacin.  Patient was given Cipro in the hospital and discharged home with approximately 5 days of ciprofloxacin given his complicated UTI and incontinence.  Plan of care was discussed with the patient and patient's daughter in detail.  Patient was seen and examined prior to discharge and deemed medically stable for discharge home.  He was set up with home health at time of discharge.

## 2019-11-28 NOTE — DISCHARGE SUMMARY
[facility] Hospital Medicine  Discharge Summary      Patient Name: Toni Kamara  MRN: [id number]  Admission Date: 11/22/2019  Hospital Length of Stay: 3 days  Discharge Date and Time: 11/26/2019  4:49 PM  Attending Physician: No att. providers found   Discharging Provider: Hieu Avila MD  Primary Care Provider: Harinder Stout MD      HPI:   The patient is a 79-year-old gentleman who presented to the emergency room with fever.  His blood work reveals leukocytosis, acute renal failure and an abnormal urinalysis.  The patient is a very poor historian and there are no family members at the bedside.  He denies having fever, nausea, flank pain or dysuria.  He was admitted to [facility] about a week and a half ago and received treatment for dehydration and a urinary tract infection.  He was discharged home on Ceftin.  I was unable to find urine culture results.    * No surgery found *      Hospital Course:   Patient is a 79-year-old with a history of Parkinson's disease dementia who presented the hospital with acute kidney injury secondary to urinary tract infection.  Urine culture came back positive for Pseudomonas.  Patient was initially on Rocephin which was changed to cefepime, however once patient's culture and sensitivities came back in his urine, it was found to be sensitive to ciprofloxacin.  Patient was given Cipro in the hospital and discharged home with approximately 5 days of ciprofloxacin given his complicated UTI and incontinence.  Plan of care was discussed with the patient and patient's daughter in detail.  Patient was seen and examined prior to discharge and deemed medically stable for discharge home.  He was set up with home health at time of discharge.     Consults:   Consults (From admission, onward)        Status Ordering Provider     Inpatient consult to Registered Dietitian/Nutritionist  Once     Provider:  (Not yet assigned)    Completed HIEU AVILA     IP consult to  case management  Once     Provider:  (Not yet assigned)    Completed ZOË KERNS          No new Assessment & Plan notes have been filed under this hospital service since the last note was generated.  Service: Hospital Medicine    Final Active Diagnoses:    Diagnosis Date Noted POA    PRINCIPAL PROBLEM:  Pseudomonas urinary tract infection [N39.0, B96.5] 11/15/2019 Yes    STEWART (acute kidney injury) [N17.9] 09/04/2018 Yes    Severe malnutrition [E43] 11/25/2019 Yes    Essential hypertension [I10] 12/28/2017 Yes    Parkinson's disease dementia [G20, F02.80] 04/13/2015 Yes    Anxiety [F41.9] 01/25/2012 Yes      Problems Resolved During this Admission:    Diagnosis Date Noted Date Resolved POA    Tachycardia [R00.0] 11/23/2019 11/25/2019 Yes       Discharged Condition: stable    Disposition: Home-Health Care Svc    Follow Up:  Follow-up Information     [facility].    Specialties:  DME Provider, Home Health Services  Contact information:  [address] [id number] [phone]             Harinder Stout MD In 1 week.    Specialty:  Family Medicine  Why:  MD nurse willl call to schedule appointment.  Contact information:  [address] [facility] [address] [phone]                 Patient Instructions:      Referral to Home health   Referral Priority: Routine Referral Type: Home Health Care   Referral Reason: Specialty Services Required   Requested Specialty: Home Health Services   Number of Visits Requested: 1     Diet Adult Regular     Notify your health care provider if you experience any of the following:  temperature >100.4     Notify your health care provider if you experience any of the following:  severe uncontrolled pain     Notify your health care provider if you experience any of the following:  increased confusion or weakness     Notify your health care provider if you experience any of the following:  difficulty breathing or increased cough     Activity as  tolerated       Significant Diagnostic Studies:     Pending Diagnostic Studies:     None         Medications:  Reconciled Home Medications:      Medication List      START taking these medications    ciprofloxacin HCl 500 MG tablet  Commonly known as:  CIPRO  Take 1 tablet (500 mg total) by mouth 2 (two) times daily. for 7 days        CHANGE how you take these medications    losartan 50 MG tablet  Commonly known as:  COZAAR  Take 1 tablet (50 mg total) by mouth once daily.  What changed:    · when to take this  · reasons to take this        CONTINUE taking these medications    carbidopa-levodopa  mg  mg per tablet  Commonly known as:  SINEMET  Take 2 tablets by mouth 6 (six) times daily.     clonazePAM 1 MG tablet  Commonly known as:  KLONOPIN  Take 1.5 tablets (1.5 mg total) by mouth every evening.     oxyCODONE-acetaminophen 5-325 mg per tablet  Commonly known as:  PERCOCET  Take 1 tablet by mouth every 12 (twelve) hours as needed for Pain.        STOP taking these medications    cefUROXime 250 MG tablet  Commonly known as:  CEFTIN            Indwelling Lines/Drains at time of discharge:   Lines/Drains/Airways     None                 Time spent on the discharge of patient: 32 minutes  Patient was seen and examined on the date of discharge and determined to be suitable for discharge.         Macario Walton MD  Department of Hospital Medicine  [facility]

## 2019-11-29 ENCOUNTER — TELEPHONE (OUTPATIENT)
Dept: FAMILY MEDICINE | Facility: CLINIC | Age: 79
End: 2019-11-29

## 2019-11-29 ENCOUNTER — NURSE TRIAGE (OUTPATIENT)
Dept: ADMINISTRATIVE | Facility: CLINIC | Age: 79
End: 2019-11-29

## 2019-11-29 ENCOUNTER — HOSPITAL ENCOUNTER (INPATIENT)
Facility: HOSPITAL | Age: 79
LOS: 5 days | Discharge: HOME HEALTH | DRG: 690 | End: 2019-12-04
Attending: EMERGENCY MEDICINE | Admitting: HOSPITALIST
Payer: MEDICARE

## 2019-11-29 ENCOUNTER — PATIENT OUTREACH (OUTPATIENT)
Dept: ADMINISTRATIVE | Facility: CLINIC | Age: 79
End: 2019-11-29

## 2019-11-29 DIAGNOSIS — N40.1 BENIGN PROSTATIC HYPERPLASIA WITH LOWER URINARY TRACT SYMPTOMS, SYMPTOM DETAILS UNSPECIFIED: Primary | ICD-10-CM

## 2019-11-29 DIAGNOSIS — N39.0 COMPLICATED UTI (URINARY TRACT INFECTION): ICD-10-CM

## 2019-11-29 DIAGNOSIS — F02.80 DEMENTIA DUE TO PARKINSON'S DISEASE WITHOUT BEHAVIORAL DISTURBANCE: ICD-10-CM

## 2019-11-29 DIAGNOSIS — R60.9 EDEMA: ICD-10-CM

## 2019-11-29 DIAGNOSIS — N41.0 PROSTATITIS, ACUTE: ICD-10-CM

## 2019-11-29 DIAGNOSIS — G20.A1 DEMENTIA DUE TO PARKINSON'S DISEASE WITHOUT BEHAVIORAL DISTURBANCE: ICD-10-CM

## 2019-11-29 DIAGNOSIS — I10 ESSENTIAL HYPERTENSION: ICD-10-CM

## 2019-11-29 PROBLEM — R60.0 LOWER EXTREMITY EDEMA: Status: ACTIVE | Noted: 2019-11-29

## 2019-11-29 LAB
ALBUMIN SERPL BCP-MCNC: 2.8 G/DL (ref 3.5–5.2)
ALP SERPL-CCNC: 88 U/L (ref 55–135)
ALT SERPL W/O P-5'-P-CCNC: 11 U/L (ref 10–44)
ANION GAP SERPL CALC-SCNC: 6 MMOL/L (ref 8–16)
AST SERPL-CCNC: 40 U/L (ref 10–40)
BASOPHILS # BLD AUTO: 0.02 K/UL (ref 0–0.2)
BASOPHILS NFR BLD: 0.3 % (ref 0–1.9)
BILIRUB SERPL-MCNC: 0.3 MG/DL (ref 0.1–1)
BILIRUB UR QL STRIP: NEGATIVE
BNP SERPL-MCNC: 254 PG/ML (ref 0–99)
BUN SERPL-MCNC: 22 MG/DL (ref 8–23)
CALCIUM SERPL-MCNC: 8.7 MG/DL (ref 8.7–10.5)
CHLORIDE SERPL-SCNC: 104 MMOL/L (ref 95–110)
CLARITY UR: CLEAR
CO2 SERPL-SCNC: 30 MMOL/L (ref 23–29)
COLOR UR: YELLOW
CREAT SERPL-MCNC: 1.2 MG/DL (ref 0.5–1.4)
DIFFERENTIAL METHOD: ABNORMAL
EOSINOPHIL # BLD AUTO: 0.1 K/UL (ref 0–0.5)
EOSINOPHIL NFR BLD: 1 % (ref 0–8)
ERYTHROCYTE [DISTWIDTH] IN BLOOD BY AUTOMATED COUNT: 13.4 % (ref 11.5–14.5)
EST. GFR  (AFRICAN AMERICAN): >60 ML/MIN/1.73 M^2
EST. GFR  (NON AFRICAN AMERICAN): 58 ML/MIN/1.73 M^2
GLUCOSE SERPL-MCNC: 97 MG/DL (ref 70–110)
GLUCOSE UR QL STRIP: NEGATIVE
HCT VFR BLD AUTO: 33.2 % (ref 40–54)
HGB BLD-MCNC: 10.5 G/DL (ref 14–18)
HGB UR QL STRIP: NEGATIVE
IMM GRANULOCYTES # BLD AUTO: 0.05 K/UL (ref 0–0.04)
KETONES UR QL STRIP: NEGATIVE
LEUKOCYTE ESTERASE UR QL STRIP: NEGATIVE
LYMPHOCYTES # BLD AUTO: 0.9 K/UL (ref 1–4.8)
LYMPHOCYTES NFR BLD: 16 % (ref 18–48)
MCH RBC QN AUTO: 31.2 PG (ref 27–31)
MCHC RBC AUTO-ENTMCNC: 31.6 G/DL (ref 32–36)
MCV RBC AUTO: 99 FL (ref 82–98)
MONOCYTES # BLD AUTO: 0.4 K/UL (ref 0.3–1)
MONOCYTES NFR BLD: 6.3 % (ref 4–15)
NEUTROPHILS # BLD AUTO: 4.4 K/UL (ref 1.8–7.7)
NEUTROPHILS NFR BLD: 75.5 % (ref 38–73)
NITRITE UR QL STRIP: NEGATIVE
NRBC BLD-RTO: 0 /100 WBC
PH UR STRIP: 7 [PH] (ref 5–8)
PLATELET # BLD AUTO: 253 K/UL (ref 150–350)
PMV BLD AUTO: 9.9 FL (ref 9.2–12.9)
POTASSIUM SERPL-SCNC: 4.3 MMOL/L (ref 3.5–5.1)
PROT SERPL-MCNC: 6 G/DL (ref 6–8.4)
PROT UR QL STRIP: NEGATIVE
RBC # BLD AUTO: 3.37 M/UL (ref 4.6–6.2)
SODIUM SERPL-SCNC: 140 MMOL/L (ref 136–145)
SP GR UR STRIP: 1.02 (ref 1–1.03)
URN SPEC COLLECT METH UR: NORMAL
UROBILINOGEN UR STRIP-ACNC: NEGATIVE EU/DL
WBC # BLD AUTO: 5.88 K/UL (ref 3.9–12.7)

## 2019-11-29 PROCEDURE — 83880 ASSAY OF NATRIURETIC PEPTIDE: CPT

## 2019-11-29 PROCEDURE — 81003 URINALYSIS AUTO W/O SCOPE: CPT

## 2019-11-29 PROCEDURE — 11000001 HC ACUTE MED/SURG PRIVATE ROOM

## 2019-11-29 PROCEDURE — 80053 COMPREHEN METABOLIC PANEL: CPT

## 2019-11-29 PROCEDURE — 63600175 PHARM REV CODE 636 W HCPCS: Performed by: NURSE PRACTITIONER

## 2019-11-29 PROCEDURE — 63600175 PHARM REV CODE 636 W HCPCS: Performed by: EMERGENCY MEDICINE

## 2019-11-29 PROCEDURE — 99285 EMERGENCY DEPT VISIT HI MDM: CPT | Mod: 25

## 2019-11-29 PROCEDURE — 85025 COMPLETE CBC W/AUTO DIFF WBC: CPT

## 2019-11-29 PROCEDURE — 25000003 PHARM REV CODE 250: Performed by: NURSE PRACTITIONER

## 2019-11-29 PROCEDURE — 36415 COLL VENOUS BLD VENIPUNCTURE: CPT

## 2019-11-29 RX ORDER — ONDANSETRON 2 MG/ML
8 INJECTION INTRAMUSCULAR; INTRAVENOUS EVERY 8 HOURS PRN
Status: DISCONTINUED | OUTPATIENT
Start: 2019-11-29 | End: 2019-12-04 | Stop reason: HOSPADM

## 2019-11-29 RX ORDER — IBUPROFEN 200 MG
24 TABLET ORAL
Status: DISCONTINUED | OUTPATIENT
Start: 2019-11-29 | End: 2019-12-04 | Stop reason: HOSPADM

## 2019-11-29 RX ORDER — LANOLIN ALCOHOL/MO/W.PET/CERES
800 CREAM (GRAM) TOPICAL
Status: DISCONTINUED | OUTPATIENT
Start: 2019-11-29 | End: 2019-12-04 | Stop reason: HOSPADM

## 2019-11-29 RX ORDER — POTASSIUM CHLORIDE 20 MEQ/15ML
60 SOLUTION ORAL
Status: DISCONTINUED | OUTPATIENT
Start: 2019-11-29 | End: 2019-12-04 | Stop reason: HOSPADM

## 2019-11-29 RX ORDER — GLUCAGON 1 MG
1 KIT INJECTION
Status: DISCONTINUED | OUTPATIENT
Start: 2019-11-29 | End: 2019-12-04 | Stop reason: HOSPADM

## 2019-11-29 RX ORDER — MEROPENEM AND SODIUM CHLORIDE 1 G/50ML
1 INJECTION, SOLUTION INTRAVENOUS ONCE
Status: COMPLETED | OUTPATIENT
Start: 2019-11-29 | End: 2019-11-29

## 2019-11-29 RX ORDER — TALC
9 POWDER (GRAM) TOPICAL NIGHTLY PRN
Status: DISCONTINUED | OUTPATIENT
Start: 2019-11-29 | End: 2019-12-04 | Stop reason: HOSPADM

## 2019-11-29 RX ORDER — ENOXAPARIN SODIUM 100 MG/ML
40 INJECTION SUBCUTANEOUS EVERY 24 HOURS
Status: DISCONTINUED | OUTPATIENT
Start: 2019-11-29 | End: 2019-12-04 | Stop reason: HOSPADM

## 2019-11-29 RX ORDER — AMOXICILLIN 250 MG
1 CAPSULE ORAL 2 TIMES DAILY
Status: DISCONTINUED | OUTPATIENT
Start: 2019-11-29 | End: 2019-12-04 | Stop reason: HOSPADM

## 2019-11-29 RX ORDER — CARBIDOPA AND LEVODOPA 25; 100 MG/1; MG/1
2 TABLET ORAL
Status: DISCONTINUED | OUTPATIENT
Start: 2019-11-29 | End: 2019-12-04 | Stop reason: HOSPADM

## 2019-11-29 RX ORDER — MEROPENEM AND SODIUM CHLORIDE 1 G/50ML
1 INJECTION, SOLUTION INTRAVENOUS
Status: DISCONTINUED | OUTPATIENT
Start: 2019-11-30 | End: 2019-11-30

## 2019-11-29 RX ORDER — OXYCODONE AND ACETAMINOPHEN 5; 325 MG/1; MG/1
1 TABLET ORAL EVERY 6 HOURS PRN
Status: DISCONTINUED | OUTPATIENT
Start: 2019-11-29 | End: 2019-12-04 | Stop reason: HOSPADM

## 2019-11-29 RX ORDER — ACETAMINOPHEN 500 MG
1000 TABLET ORAL EVERY 6 HOURS PRN
Status: DISCONTINUED | OUTPATIENT
Start: 2019-11-29 | End: 2019-12-04 | Stop reason: HOSPADM

## 2019-11-29 RX ORDER — SODIUM CHLORIDE 0.9 % (FLUSH) 0.9 %
10 SYRINGE (ML) INJECTION
Status: DISCONTINUED | OUTPATIENT
Start: 2019-11-29 | End: 2019-12-04 | Stop reason: HOSPADM

## 2019-11-29 RX ORDER — IBUPROFEN 200 MG
16 TABLET ORAL
Status: DISCONTINUED | OUTPATIENT
Start: 2019-11-29 | End: 2019-12-04 | Stop reason: HOSPADM

## 2019-11-29 RX ORDER — POTASSIUM CHLORIDE 20 MEQ/15ML
40 SOLUTION ORAL
Status: DISCONTINUED | OUTPATIENT
Start: 2019-11-29 | End: 2019-12-04 | Stop reason: HOSPADM

## 2019-11-29 RX ADMIN — ENOXAPARIN SODIUM 40 MG: 100 INJECTION SUBCUTANEOUS at 10:11

## 2019-11-29 RX ADMIN — MEROPENEM AND SODIUM CHLORIDE 1 G: 1 INJECTION, SOLUTION INTRAVENOUS at 08:11

## 2019-11-29 RX ADMIN — CLONAZEPAM 1.5 MG: 0.5 TABLET ORAL at 10:11

## 2019-11-29 RX ADMIN — SENNOSIDES AND DOCUSATE SODIUM 1 TABLET: 8.6; 5 TABLET ORAL at 10:11

## 2019-11-29 RX ADMIN — CARBIDOPA AND LEVODOPA 2 TABLET: 25; 100 TABLET ORAL at 10:11

## 2019-11-29 NOTE — TELEPHONE ENCOUNTER
----- Message from Nadira Oconnell sent at 11/29/2019 10:49 AM CST -----  Contact: Bailey Bass (Daughter) [phone]  Bailey Bass (Daughter) [phone]  Requesting a call from the nurse stated patient was discharged from hospital she have concerns about cipro medication   Patient also have clicking and popping to knee joints, patient also experiencing some swelling in the feet and ankles   Patient is weak   Requesting medical advice

## 2019-11-29 NOTE — TELEPHONE ENCOUNTER
After reviewing the patient's chart it appears he has Pseudomonas in his urine.  There are not really any good oral antibiotics I could recommend, Cipro is the appropriate antibiotic.  I do not think it is causing the swelling in his lower extremities, he may need to be evaluated in person either in urgent care setting or emergency room due to the new swelling. The excessive weakness has also of great concern.  This could be signs that he is becoming septic work could just be mild muscle aches due to the ciprofloxacin.  It is hard to make a fair assessment on what is causing his symptoms.    Long story short, he needs to be evaluated in person.

## 2019-11-29 NOTE — TELEPHONE ENCOUNTER
Pt daughter is concerned because pt was just discharged from the hospital for a UTI, he is currently taking Cipro because pseudomonas is present, she states he is now having bilateral lower extremity edema, clicking sound in both knees, and general weakness, I looked in chart and advised her that Dr Rodriguez states he needs to be assessed by a provider, she wants to know if they go to UC, will they send him home on IV ABX, advised her I cannot speak for what the UC will do but he needs to start with being assessed, caller agreed

## 2019-11-29 NOTE — PROGRESS NOTES
Patient has appt on 12/9/19 with Dr. Stout. Attempted to contact for sooner appointments with other providers, no answer, unable to leave voicemail

## 2019-11-29 NOTE — TELEPHONE ENCOUNTER
----- Message from Russell Wilkerson sent at 11/29/2019  3:12 PM CST -----  Type: Needs Medical Advice    Who Called: Bailey Bass (Daughter)    Best Call Back Number: [phone] or [phone] Please call both if no answer from one.      Additional Information:  Caller states that she would like a callback regarding the patient having a possible reaction to ciprofloxacin HCl (CIPRO) 500 MG tablet

## 2019-11-29 NOTE — TELEPHONE ENCOUNTER
Spoke with pt's daughter, pt was in ED for hospital for uti last week. Pt is on cipro now, pt is now having edema in hs lower extremities. He is having excessive weakness, more than usual and clicking and popping sound in his knee. Advised her to stop cipro and I will check with one of our providers. Dr. Stout is not in office. Please review urine culture and see if there is something else pt can be taking.

## 2019-11-30 ENCOUNTER — OUTSIDE PLACE OF SERVICE (OUTPATIENT)
Dept: INFECTIOUS DISEASES | Facility: CLINIC | Age: 79
End: 2019-11-30
Payer: MEDICARE

## 2019-11-30 LAB
ALBUMIN SERPL BCP-MCNC: 2.4 G/DL (ref 3.5–5.2)
ALP SERPL-CCNC: 78 U/L (ref 55–135)
ALT SERPL W/O P-5'-P-CCNC: 6 U/L (ref 10–44)
ANION GAP SERPL CALC-SCNC: 6 MMOL/L (ref 8–16)
AORTIC ROOT ANNULUS: 3.32 CM
AORTIC VALVE CUSP SEPERATION: 1.99 CM
AST SERPL-CCNC: 29 U/L (ref 10–40)
AV INDEX (PROSTH): 0.87
AV MEAN GRADIENT: 3 MMHG
AV PEAK GRADIENT: 6 MMHG
AV VALVE AREA: 2.62 CM2
AV VELOCITY RATIO: 0.75
BASOPHILS # BLD AUTO: 0.02 K/UL (ref 0–0.2)
BASOPHILS NFR BLD: 0.4 % (ref 0–1.9)
BILIRUB SERPL-MCNC: 0.3 MG/DL (ref 0.1–1)
BSA FOR ECHO PROCEDURE: 1.56 M2
BUN SERPL-MCNC: 27 MG/DL (ref 8–23)
CALCIUM SERPL-MCNC: 8.2 MG/DL (ref 8.7–10.5)
CHLORIDE SERPL-SCNC: 107 MMOL/L (ref 95–110)
CO2 SERPL-SCNC: 28 MMOL/L (ref 23–29)
CREAT SERPL-MCNC: 1.2 MG/DL (ref 0.5–1.4)
CV ECHO LV RWT: 0.39 CM
DIFFERENTIAL METHOD: ABNORMAL
DOP CALC AO PEAK VEL: 1.27 M/S
DOP CALC AO VTI: 24.56 CM
DOP CALC LVOT AREA: 3 CM2
DOP CALC LVOT DIAMETER: 1.96 CM
DOP CALC LVOT PEAK VEL: 0.95 M/S
DOP CALC LVOT STROKE VOLUME: 64.41 CM3
DOP CALCLVOT PEAK VEL VTI: 21.36 CM
E WAVE DECELERATION TIME: 359.09 MSEC
E/A RATIO: 0.77
E/E' RATIO: 9.2 M/S
ECHO LV POSTERIOR WALL: 0.9 CM (ref 0.6–1.1)
EOSINOPHIL # BLD AUTO: 0.1 K/UL (ref 0–0.5)
EOSINOPHIL NFR BLD: 1.3 % (ref 0–8)
ERYTHROCYTE [DISTWIDTH] IN BLOOD BY AUTOMATED COUNT: 13.4 % (ref 11.5–14.5)
EST. GFR  (AFRICAN AMERICAN): >60 ML/MIN/1.73 M^2
EST. GFR  (NON AFRICAN AMERICAN): 58 ML/MIN/1.73 M^2
FRACTIONAL SHORTENING: 27 % (ref 28–44)
GLUCOSE SERPL-MCNC: 97 MG/DL (ref 70–110)
HCT VFR BLD AUTO: 32.7 % (ref 40–54)
HGB BLD-MCNC: 10.2 G/DL (ref 14–18)
IMM GRANULOCYTES # BLD AUTO: 0.04 K/UL (ref 0–0.04)
INTERVENTRICULAR SEPTUM: 0.9 CM (ref 0.6–1.1)
IVRT: 0.16 MSEC
LEFT ATRIUM SIZE: 4.65 CM
LEFT INTERNAL DIMENSION IN SYSTOLE: 3.35 CM (ref 2.1–4)
LEFT VENTRICLE MASS INDEX: 87 G/M2
LEFT VENTRICULAR INTERNAL DIMENSION IN DIASTOLE: 4.62 CM (ref 3.5–6)
LEFT VENTRICULAR MASS: 138.71 G
LV LATERAL E/E' RATIO: 9.86 M/S
LV SEPTAL E/E' RATIO: 8.63 M/S
LYMPHOCYTES # BLD AUTO: 1 K/UL (ref 1–4.8)
LYMPHOCYTES NFR BLD: 20.9 % (ref 18–48)
MAGNESIUM SERPL-MCNC: 1.8 MG/DL (ref 1.6–2.6)
MCH RBC QN AUTO: 30.4 PG (ref 27–31)
MCHC RBC AUTO-ENTMCNC: 31.2 G/DL (ref 32–36)
MCV RBC AUTO: 98 FL (ref 82–98)
MONOCYTES # BLD AUTO: 0.3 K/UL (ref 0.3–1)
MONOCYTES NFR BLD: 7.5 % (ref 4–15)
MV A" WAVE DURATION": 88 MSEC
MV PEAK A VEL: 0.9 M/S
MV PEAK E VEL: 0.69 M/S
MV STENOSIS PRESSURE HALF TIME: 104 MS
MV VALVE AREA P 1/2 METHOD: 2.12 CM2
NEUTROPHILS # BLD AUTO: 3.1 K/UL (ref 1.8–7.7)
NEUTROPHILS NFR BLD: 69 % (ref 38–73)
NRBC BLD-RTO: 0 /100 WBC
PISA TR MAX VEL: 2.36 M/S
PLATELET # BLD AUTO: 264 K/UL (ref 150–350)
PMV BLD AUTO: 10.3 FL (ref 9.2–12.9)
POTASSIUM SERPL-SCNC: 3.9 MMOL/L (ref 3.5–5.1)
PROT SERPL-MCNC: 5.3 G/DL (ref 6–8.4)
PULM VEIN A" WAVE DURATION": 78 MSEC
PV PEAK VELOCITY: 0.81 CM/S
RA PRESSURE: 3 MMHG
RBC # BLD AUTO: 3.35 M/UL (ref 4.6–6.2)
RIGHT VENTRICULAR END-DIASTOLIC DIMENSION: 2.94 CM
SODIUM SERPL-SCNC: 141 MMOL/L (ref 136–145)
TDI LATERAL: 0.07 M/S
TDI SEPTAL: 0.08 M/S
TDI: 0.08 M/S
TR MAX PG: 22 MMHG
TRICUSPID ANNULAR PLANE SYSTOLIC EXCURSION: 2.4 CM
TV REST PULMONARY ARTERY PRESSURE: 25 MMHG
WBC # BLD AUTO: 4.54 K/UL (ref 3.9–12.7)

## 2019-11-30 PROCEDURE — 83735 ASSAY OF MAGNESIUM: CPT

## 2019-11-30 PROCEDURE — 97530 THERAPEUTIC ACTIVITIES: CPT

## 2019-11-30 PROCEDURE — 11000001 HC ACUTE MED/SURG PRIVATE ROOM

## 2019-11-30 PROCEDURE — 63600175 PHARM REV CODE 636 W HCPCS: Performed by: NURSE PRACTITIONER

## 2019-11-30 PROCEDURE — 36415 COLL VENOUS BLD VENIPUNCTURE: CPT

## 2019-11-30 PROCEDURE — 63600175 PHARM REV CODE 636 W HCPCS: Performed by: HOSPITALIST

## 2019-11-30 PROCEDURE — 63600175 PHARM REV CODE 636 W HCPCS: Performed by: INTERNAL MEDICINE

## 2019-11-30 PROCEDURE — 97116 GAIT TRAINING THERAPY: CPT

## 2019-11-30 PROCEDURE — G8988 SELF CARE GOAL STATUS: HCPCS | Mod: CM

## 2019-11-30 PROCEDURE — 85025 COMPLETE CBC W/AUTO DIFF WBC: CPT

## 2019-11-30 PROCEDURE — 80053 COMPREHEN METABOLIC PANEL: CPT

## 2019-11-30 PROCEDURE — 97535 SELF CARE MNGMENT TRAINING: CPT

## 2019-11-30 PROCEDURE — G8987 SELF CARE CURRENT STATUS: HCPCS | Mod: CM

## 2019-11-30 PROCEDURE — 99222 PR INITIAL HOSPITAL CARE,LEVL II: ICD-10-PCS | Mod: S$GLB,,, | Performed by: INTERNAL MEDICINE

## 2019-11-30 PROCEDURE — 97161 PT EVAL LOW COMPLEX 20 MIN: CPT

## 2019-11-30 PROCEDURE — 99222 1ST HOSP IP/OBS MODERATE 55: CPT | Mod: S$GLB,,, | Performed by: INTERNAL MEDICINE

## 2019-11-30 PROCEDURE — 25000003 PHARM REV CODE 250: Performed by: NURSE PRACTITIONER

## 2019-11-30 PROCEDURE — 97165 OT EVAL LOW COMPLEX 30 MIN: CPT

## 2019-11-30 RX ORDER — MEROPENEM AND SODIUM CHLORIDE 1 G/50ML
1 INJECTION, SOLUTION INTRAVENOUS
Status: DISCONTINUED | OUTPATIENT
Start: 2019-11-30 | End: 2019-12-04 | Stop reason: HOSPADM

## 2019-11-30 RX ORDER — FUROSEMIDE 10 MG/ML
40 INJECTION INTRAMUSCULAR; INTRAVENOUS ONCE
Status: COMPLETED | OUTPATIENT
Start: 2019-11-30 | End: 2019-11-30

## 2019-11-30 RX ADMIN — CARBIDOPA AND LEVODOPA 2 TABLET: 25; 100 TABLET ORAL at 03:11

## 2019-11-30 RX ADMIN — CARBIDOPA AND LEVODOPA 2 TABLET: 25; 100 TABLET ORAL at 05:11

## 2019-11-30 RX ADMIN — SENNOSIDES AND DOCUSATE SODIUM 1 TABLET: 8.6; 5 TABLET ORAL at 09:11

## 2019-11-30 RX ADMIN — CARBIDOPA AND LEVODOPA 2 TABLET: 25; 100 TABLET ORAL at 09:11

## 2019-11-30 RX ADMIN — MEROPENEM AND SODIUM CHLORIDE 1 G: 1 INJECTION, SOLUTION INTRAVENOUS at 03:11

## 2019-11-30 RX ADMIN — CARBIDOPA AND LEVODOPA 2 TABLET: 25; 100 TABLET ORAL at 02:11

## 2019-11-30 RX ADMIN — FUROSEMIDE 40 MG: 10 INJECTION, SOLUTION INTRAMUSCULAR; INTRAVENOUS at 01:11

## 2019-11-30 RX ADMIN — ENOXAPARIN SODIUM 40 MG: 100 INJECTION SUBCUTANEOUS at 04:11

## 2019-11-30 RX ADMIN — CLONAZEPAM 1.5 MG: 0.5 TABLET ORAL at 09:11

## 2019-11-30 RX ADMIN — OXYCODONE AND ACETAMINOPHEN 1 TABLET: 5; 325 TABLET ORAL at 03:11

## 2019-11-30 RX ADMIN — CARBIDOPA AND LEVODOPA 2 TABLET: 25; 100 TABLET ORAL at 06:11

## 2019-11-30 RX ADMIN — ACETAMINOPHEN 1000 MG: 500 TABLET ORAL at 06:11

## 2019-11-30 RX ADMIN — Medication 9 MG: at 09:11

## 2019-11-30 NOTE — ASSESSMENT & PLAN NOTE
Uncertain etiology, seems unlikely this is related to Cipro.  Check bilateral lower extremity ultrasound, TTE, and CXR.  Elevate lower extremities.

## 2019-11-30 NOTE — PLAN OF CARE
SW met w/ pt for assessment.  Also present is one of his sitters, Manny Domínguez [phone].  Pt lives alone but has 24 hour in home sitter care to assist w/ bathing, dressing, meal prep, medication management, transport to medical appointments.  Pt is active w/ [facility], prefers to resume w/ same provider.  Pt physically unable to sign, provided verbal consent for Disclosure form.         11/30/19 7100   Discharge Assessment   Assessment Type Discharge Planning Assessment   Confirmed/corrected address and phone number on facesheet? Yes   Assessment information obtained from? Patient;Caregiver   Prior to hospitilization cognitive status: Alert/Oriented   Prior to hospitalization functional status: Needs Assistance;Assistive Equipment   Current cognitive status: Alert/Oriented   Current Functional Status: Needs Assistance;Assistive Equipment   Facility Arrived From: home    Lives With alone  (has 24 sitter care)   Able to Return to Prior Arrangements yes   Is patient able to care for self after discharge? No   Who are your caregiver(s) and their phone number(s)? son:  Marc Kamara  [phone]  daughter:  Bailey Bass [phone]      Patient's perception of discharge disposition home health   Readmission Within the Last 30 Days current reason for admission unrelated to previous admission   If yes, most recent facility name: [facility]   Patient currently being followed by outpatient case management? No   Patient currently receives any other outside agency services? Yes   Name and contact number of agency or person providing outside services [facility]   Is it the patient/care giver preference to resume care with the current outside agency? Yes   Equipment Currently Used at Home bedside commode;cane, straight;cane, quad;rollator;lift device;hospital bed;shower chair   Do you have any problems affording any of your prescribed medications? No   Is the patient taking medications as prescribed? yes   Does  the patient have transportation home? Yes   Transportation Anticipated family or friend will provide   Dialysis Name and Scheduled days N/A   Does the patient receive services at the [facility]? No   Discharge Plan A Home Health   Discharge Plan B Home Health   DME Needed Upon Discharge  none   Patient/Family in Agreement with Plan yes

## 2019-11-30 NOTE — H&P
[facility] Medicine  History & Physical    Patient Name: Toni Kamara  MRN: [id number]  Admission Date: 11/29/2019  Attending Physician: Efrain Manuel MD  Primary Care Provider: Harinder Stout MD         Patient information was obtained from patient, relative(s), past medical records and ER records.     Subjective:     Principal Problem:Complicated UTI (urinary tract infection)    Chief Complaint:   Chief Complaint   Patient presents with    Urinary Tract Infection     currently on antibiotics for same, concern for new onset leg swelling since that time (Lt > Rt)    Leg Swelling        HPI: Toni Kamara is a 79-year-old male with PMHx significant for Parkinson's disease, BPH, HTN, arthritis, and anxiety.  He presented in the ED the recommendation of his PCP further evaluation of leg swelling and fatigue.  Pt was recently D/C on 11/26 after inpatient treatment for pseudomonal UTI.  Pt began oral Cipro on the morning of 11/26 after culture and sensitivity results.  Pt was D/C on oral Cipro and has been compliant with medications since that time.  His daughter is concerned over increasing fatigue and weakness as well as bilateral lower extremity edema. Pt denies any Hx of edema. He also endorses some cracking and popping to his left knee as well as left knee pain. The pain is not any sensation, however the popping and cracking is.  His daughter became concerned over possibility of adverse reaction to Cipro.  His PCP was phoned, symptoms were reported, and he was recommended to follow up in ED for further evaluation and IV antibiotic therapy for treatment of his pseudomonal UTI given limited sensitivities.  He was D/C on a 5 day course and has completed 3 of 5 days.  Pt reports ongoing dysuria since discharge, though did urinate in ED without symptoms.  He denies any chest pain, SOB, cough, nausea, vomiting, abdominal pain, HA, or falls.  He denies any Hx of blood clots and denies any Hx of  lower extremity edema. She is admitted to service of hospital Medicine for additional evaluation and management.  Other pertinent medical Hx as below:    Past Medical History:   Diagnosis Date    Anxiety 1/25/2012    Arthritis of knee 1/25/2012    Back pain 1/25/2012    BPH (benign prostatic hyperplasia) 1/25/2012    Essential tremor 3/19/2014    Kyphoscoliosis 1/25/2012    Parkinson's disease        Past Surgical History:   Procedure Laterality Date    APPENDECTOMY      FRACTURE SURGERY Left Dec 18, 2014    HEMORRHOID SURGERY      PROSTATE SURGERY  2008    TURP and had renal insu from obsr    TONSILLECTOMY         Review of patient's allergies indicates:  No Known Allergies    No current facility-administered medications on file prior to encounter.      Current Outpatient Medications on File Prior to Encounter   Medication Sig    carbidopa-levodopa  mg (SINEMET)  mg per tablet Take 2 tablets by mouth 4 (four) times daily.     ciprofloxacin HCl (CIPRO) 500 MG tablet Take 1 tablet (500 mg total) by mouth 2 (two) times daily. for 7 days    clonazePAM (KLONOPIN) 1 MG tablet Take 1.5 tablets (1.5 mg total) by mouth every evening.    losartan (COZAAR) 50 MG tablet Take 1 tablet (50 mg total) by mouth once daily. (Patient taking differently: Take 50 mg by mouth daily as needed. )    [DISCONTINUED] oxyCODONE-acetaminophen (PERCOCET) 5-325 mg per tablet Take 1 tablet by mouth every 12 (twelve) hours as needed for Pain.     Family History     Problem Relation (Age of Onset)    Heart disease Mother (79)    Parkinsonism Father (83)        Tobacco Use    Smoking status: Former Smoker    Smokeless tobacco: Never Used   Substance and Sexual Activity    Alcohol use: No    Drug use: No    Sexual activity: Not on file     Review of Systems   Constitutional: Positive for fatigue. Negative for activity change, appetite change, chills and fever.   HENT: Negative for congestion, postnasal drip, sore  throat and trouble swallowing.    Eyes: Negative for photophobia and visual disturbance.   Respiratory: Negative for cough, shortness of breath and wheezing.    Cardiovascular: Positive for leg swelling. Negative for chest pain and palpitations.   Gastrointestinal: Negative for abdominal distention, constipation, diarrhea, nausea and vomiting.   Genitourinary: Positive for dysuria. Negative for difficulty urinating, flank pain, frequency and hematuria.   Musculoskeletal: Negative for arthralgias and myalgias.   Skin: Negative for color change.   Neurological: Negative for dizziness, weakness, light-headedness and headaches.   Psychiatric/Behavioral: Negative for confusion. The patient is not nervous/anxious.      Objective:     Vital Signs (Most Recent):  Temp: 98.2 °F (36.8 °C) (11/29/19 1823)  Pulse: 80 (11/29/19 2028)  Resp: 18 (11/29/19 1823)  BP: (!) 179/88 (11/29/19 2028)  SpO2: 96 % (11/29/19 2028) Vital Signs (24h Range):  Temp:  [98.2 °F (36.8 °C)] 98.2 °F (36.8 °C)  Pulse:  [80-90] 80  Resp:  [18] 18  SpO2:  [96 %-99 %] 96 %  BP: (143-179)/(68-88) 179/88     Weight: 52.2 kg (115 lb)  Body mass index is 18.01 kg/m².    Physical Exam   Constitutional: He is oriented to person, place, and time. He appears well-developed and well-nourished. No distress.   HENT:   Head: Normocephalic and atraumatic.   Eyes: Pupils are equal, round, and reactive to light. Conjunctivae and EOM are normal.   Neck: Normal range of motion. Neck supple.   Cardiovascular: Normal rate, regular rhythm, normal heart sounds and intact distal pulses.   No murmur heard.  Pulmonary/Chest: Effort normal and breath sounds normal. No respiratory distress. He exhibits no tenderness.   Abdominal: Soft. Bowel sounds are normal. He exhibits no distension. There is no tenderness.   Musculoskeletal: Normal range of motion. He exhibits edema (2+ pitting BLEs). He exhibits no tenderness.   Neurological: He is alert and oriented to person, place, and  time. No cranial nerve deficit.   +tremor   Skin: Skin is warm and dry. Capillary refill takes less than 2 seconds. No erythema.   Psychiatric: He has a normal mood and affect. His behavior is normal. Judgment and thought content normal.         CRANIAL NERVES     CN III, IV, VI   Pupils are equal, round, and reactive to light.  Extraocular motions are normal.        Significant Labs:   CBC:   Recent Labs   Lab 11/29/19 1908   WBC 5.88   HGB 10.5*   HCT 33.2*        CMP:   Recent Labs   Lab 11/29/19 1908      K 4.3      CO2 30*   GLU 97   BUN 22   CREATININE 1.2   CALCIUM 8.7   PROT 6.0   ALBUMIN 2.8*   BILITOT 0.3   ALKPHOS 88   AST 40   ALT 11   ANIONGAP 6*   EGFRNONAA 58*     Cardiac Markers:   Recent Labs   Lab 11/29/19 1908   *     Urine Studies:   Recent Labs   Lab 11/29/19 2005   COLORU Yellow   APPEARANCEUA Clear   PHUR 7.0   SPECGRAV 1.020   PROTEINUA Negative   GLUCUA Negative   KETONESU Negative   BILIRUBINUA Negative   OCCULTUA Negative   NITRITE Negative   UROBILINOGEN Negative   LEUKOCYTESUR Negative           Assessment/Plan:     * Complicated UTI (urinary tract infection)  Pt with pseudomonal UTI.  Family feels he is not tolerating Cipro.  PCP was contacted and it was recommended he be evaluated in the ED initiated on IV Abx if not tolerating Cipro.  Pt completed 3/5 days Cipro therapy.  UA has cleared up, though Pt reports continued dysuria.  We will continue treatment with IV Merrem to which his pseudomonal UTI is susceptible.  Consult with ID for recommendations regarding ongoing antibiotic therapy/duration of therapy in the setting of Pt's possible intolerance of Cipro.  Appreciate recommendations.    Parkinson's disease dementia  Chronic, stable.  We will continue his Sinemet her outpatient regimen.  Pain fall precautions.  Utilize nonpharmacologic modalities to prevent delirium for which he is high risk.      Lower extremity edema  Uncertain etiology, seems  unlikely this is related to Cipro.  Check bilateral lower extremity ultrasound, TTE, and CXR.  Elevate lower extremities.      Anxiety disorder   Chronic, stable.  Continue clonazepam as per outpatient regimen.  Monitor clinically      Weakness generalized  Progressive waeakness per daughter.  Consult PT/OT.  Maintain fall precautions.        Essential hypertension  Chronic, BP is acutely uncontrolled and elevated.  Pt states he has not been on his oral antihypertensive due to several low BP readings and was told to hold this medication.  Will monitor his BP and if remains elevated will likely restart his ARB.      BPH (benign prostatic hyperplasia)  Chronic issue, associated with pseudomonal UTI.  Not on any medications for his BPH.  Consider urology follow-up.        VTE Risk Mitigation (From admission, onward)    High: Lovenox 40 mg SQ daily             Catie Carter NP  Department of Hospital Medicine   [facility]

## 2019-11-30 NOTE — CONSULTS
"Consult Note  Infectious Disease    Reason for Consult:  Pseudomonas UTI, intolerant of cipro    HPI: Toni Kamara is a  79 y.o. male with Parkinson's who was admitted to [facility] from 11/14-19 for severe dehydration and presumed UTI(no culture). He received rocephin and was discharged on ceftin. He confirms for me that he did have dysuria at that time. He was admitted here 3 days later with fever. He was treated with cefepime, found to have pseudomonas in his urine, then discharged on 11/26 on oral cipro. Since discharge he developed LE edema and his daughter noted "popping and cracking" of his knees. She read that this could be due to cipro, contacted his primary physician who told her to stop it and bring him back to the ED for IV antibiotics. He denies pain to me nor any pain or tenderness over patellar or achilles tendon and there is no crepitance or pain on ROM of his knees. He has been given diuretics. Swelling has decreased.  He has been spending much of his time in a chair at home with legs dependent. Clare reports that he drinks well and often goes to the restroom at his discretion though he is incontinent at times. He confirms that he had prostate surgery 5-6 years ago because he was retaining "310cc" of urine. Sirenarai believes he may be retaining currently but he feels as though he empties well.   I phoned his daughter to obtain permission to resume cipro but she was adamant that he not receive this because of what she has read on the internet and because other family members have suffered permanent injury as a result of cipro.     Review of patient's allergies indicates:  No Known Allergies  Past Medical History:   Diagnosis Date    Anxiety 1/25/2012    Arthritis of knee 1/25/2012    Back pain 1/25/2012    BPH (benign prostatic hyperplasia) 1/25/2012    Essential tremor 3/19/2014    Kyphoscoliosis 1/25/2012    Parkinson's disease      Past Surgical History:   Procedure Laterality Date    " APPENDECTOMY      FRACTURE SURGERY Left Dec 18, 2014    HEMORRHOID SURGERY      PROSTATE SURGERY  2008    TURP and had renal insu from obsr    TONSILLECTOMY       Social History     Socioeconomic History    Marital status:      Spouse name: Not on file    Number of children: Not on file    Years of education: Not on file    Highest education level: Not on file   Occupational History    Not on file   Social Needs    Financial resource strain: Not on file    Food insecurity:     Worry: Not on file     Inability: Not on file    Transportation needs:     Medical: Not on file     Non-medical: Not on file   Tobacco Use    Smoking status: Former Smoker    Smokeless tobacco: Never Used   Substance and Sexual Activity    Alcohol use: No    Drug use: No    Sexual activity: Not on file   Lifestyle    Physical activity:     Days per week: Not on file     Minutes per session: Not on file    Stress: Not on file   Relationships    Social connections:     Talks on phone: Not on file     Gets together: Not on file     Attends Hinduism service: Not on file     Active member of club or organization: Not on file     Attends meetings of clubs or organizations: Not on file     Relationship status: Not on file   Other Topics Concern    Not on file   Social History Narrative    Not on file     Family History   Problem Relation Age of Onset    Heart disease Mother 79        Coronary stent    Parkinsonism Father 83       Pertinent medications noted:     Review of Systems:   No chills, fever, sweats,  No pain in mouth or throat.    No nausea, vomiting, diarrhea,  or focal abd pain,   resolving dysuria,no hematuria,? retention,  Some incontinence,    No swelling of joints, redness of joints, injuries, or new focal pain  No anxiety, depression,    No diabetes,  No bleeding,  , malignancy, unusual bruising      EXAM & DIAGNOSTICS REVIEWED:   Vitals:     Temp:  [96.9 °F (36.1 °C)-98.2 °F (36.8 °C)]   Temp: 96.9 °F  (36.1 °C) (11/30/19 1626)  Pulse: 78 (11/30/19 1626)  Resp: 16 (11/30/19 1121)  BP: 134/72 (11/30/19 1626)  SpO2: 97 % (11/30/19 1626)    Intake/Output Summary (Last 24 hours) at 11/30/2019 1733  Last data filed at 11/30/2019 1600  Gross per 24 hour   Intake 1010 ml   Output --   Net 1010 ml       General:  In NAD. Alert and attentive, cooperative, comfortable. Extremely soft spoken  Eyes:  Anicteric, PERRL, EOMI  ENT:  No ulcers, exudates, thrush, nares patent, dentition is  Neck:  supple, no masses or adenopathy appreciated  Lungs: Clear, no consolidation, rales, wheezes, rub  Heart:  RRR, no gallop/murmur/rub noted  Abd:  Soft, NT, ND, normal BS, no masses or organomegaly appreciated.  :  Voids  no flank tenderness  Musc:  Joints without effusion, swelling, erythema, synovitis, muscle wasting. No tenderness over the patella or achilles tendons. No crepitance with ROM, no pain or guarding.  Skin:  No rashes. No palmar or plantar lesions. No subungual petechiae  Wound:   Neuro:  Alert, attentive, speech fluent, face symmetric, moves all extremities but very stiff in general, no focal weakness but generally weak  Psych:  Calm, cooperative  Extrem: Mild LE edema,no erythema, phlebitis, cellulitis, warm and well perfused  VAD:       Isolation:      Lines/Tubes/Drains:    General Labs reviewed:  Recent Labs   Lab 11/26/19  0508 11/29/19 1908 11/30/19  0613   WBC 6.15 5.88 4.54   HGB 11.0* 10.5* 10.2*   HCT 33.4* 33.2* 32.7*    253 264       Recent Labs   Lab 11/26/19  0508 11/29/19 1908 11/30/19  0613    140 141   K 4.0 4.3 3.9    104 107   CO2 27 30* 28   BUN 23 22 27*   CREATININE 1.1 1.2 1.2   CALCIUM 8.2* 8.7 8.2*   PROT  --  6.0 5.3*   BILITOT  --  0.3 0.3   ALKPHOS  --  88 78   ALT  --  11 6*   AST  --  40 29           Micro:  Microbiology Results (last 7 days)     ** No results found for the last 168 hours. **        Imaging Reviewed:      Cardiology:    IMPRESSION & PLAN   1. It is  unlikely that cipro is responsible for his peripheral edema, nor any joint or tendon pathology  2. Pseudomonas UTI, r/o prostatitis, urinary retention  3. Advanced Parkinson's disease    Recommendations:  I phoned his daughter to obtain permission to resume cipro but she was adamant that he not receive this because of what she has read on the internet and because other family members have suffered permanent injury as a result of cipro. I advised that This leaves only IV antibiotics which are very broad spectrum and run the risk of diarrhea, C.diff, etc.and will tie him to the hospital.     Would check PVR for retention. If he is in retention, this will need to be addressed, and would extend rx to 2 weeks for prostatitis.     Thanks, will sign off

## 2019-11-30 NOTE — ASSESSMENT & PLAN NOTE
Chronic, stable.  We will continue his Sinemet her outpatient regimen.  Pain fall precautions.  Utilize nonpharmacologic modalities to prevent delirium for which he is high risk.

## 2019-11-30 NOTE — PLAN OF CARE
Patient alert , able to make needs known, bed alarm on , reminded to use call light before he tries to get up, iv patent , safety and neuro intact

## 2019-11-30 NOTE — PROGRESS NOTES
Pharmacist Renal Dose Adjustment Note    Toni Kamara is a 79 y.o. male being treated with the medication Merrem    Patient Data:    Vital Signs (Most Recent):  Temp: 97.8 °F (36.6 °C) (11/30/19 0825)  Pulse: 86 (11/30/19 0825)  Resp: 18 (11/30/19 0825)  BP: (!) 150/67 (11/30/19 0825)  SpO2: (!) 92 % (11/30/19 0825)   Vital Signs (72h Range):  Temp:  [96.9 °F (36.1 °C)-98.2 °F (36.8 °C)]   Pulse:  [71-90]   Resp:  [17-18]   BP: (134-186)/(59-88)   SpO2:  [92 %-99 %]      Recent Labs   Lab 11/26/19  0508 11/29/19  1908 11/30/19  0613   CREATININE 1.1 1.2 1.2     Serum creatinine: 1.2 mg/dL 11/30/19 0613  Estimated creatinine clearance: 37.4 mL/min    Medication:Merrem dose: 1g frequency q8h will be changed to medication:Merrem dose:1g frequency:q12h    Pharmacist's Name: Waqar Pedroza  Pharmacist's Extension: 2600

## 2019-11-30 NOTE — PT/OT/SLP EVAL
Occupational Therapy   Evaluation    Name: Toni Kamara  MRN: [id number]  Admitting Diagnosis:  Complicated UTI (urinary tract infection)      Recommendations:     Discharge Recommendations: home health PT, home health OT, home health speech therapy, home with home health  Discharge Equipment Recommendations:  none  Barriers to discharge:  None(pt has 24 hour caregivers)    Assessment:     Toni Kamara is a 79 y.o. male with a medical diagnosis of Complicated UTI (urinary tract infection).  Performance deficits affecting function: impaired coordination, impaired fine motor, decreased upper extremity function, decreased coordination, impaired self care skills, weakness.  Pt. Has 24 hour caregivers that feed, dress, assist with bathing and do all homemaking.    Rehab Prognosis: Fair; patient would benefit from acute skilled OT services to address these deficits and reach maximum level of function.   Patient may benefit from introduction of AE to assist with self feeding and grooming.    Plan:     Patient to be seen 3 x/week to address the above listed problems via self-care/home management, therapeutic activities, therapeutic exercises  · Plan of Care Expires: 12/07/19  · Plan of Care Reviewed with: patient    Subjective     Chief Complaint: does not like the food  Patient/Family Comments/goals: return home with 24/7 caregivers    Occupational Profile:  Living Environment: home with 24 hour caregivers  Previous level of function: max-total assist for ADL, pt reports lea the uses lift chair and ambulated with RW. Admits increase in falls.  Roles and Routines: in home ambulation, tv  Equipment Used at Home:  bedside commode, walker, rolling, wheelchair, lift device, grab bar, shower chair  Assistance upon Discharge: 24/7 caregivers in home    Pain/Comfort:  · Pain Rating 1: 0/10  · Pain Rating Post-Intervention 2: 0/10    Patients cultural, spiritual, Sikh conflicts given the current situation: no    Objective:      Communicated with: nurse prior to session.  Patient found HOB elevated with peripheral IV upon OT entry to room.    General Precautions: Standard, fall   Orthopedic Precautions:N/A   Braces: N/A     Occupational Performance:    Bed Mobility:    · Patient completed Rolling/Turning to Left with  moderate assistance  · Patient completed Rolling/Turning to Right with moderate assistance      Activities of Daily Living:  · Feeding:  maximal assistance: able to bring glass with straw to mouth, able to hold candy and put in mouth.  · Grooming: maximal assistance     Cognitive/Visual Perceptual:  Cognitive/Psychosocial Skills:     -       Oriented to: Person, Place and Time   -       Follows Commands/attention:Follows one-step commands  -       Communication: very low volume  -       Mood/Affect/Coping skills/emotional control: Appropriate to situation, Blunted and Pleasant    AMPAC 6 Click ADL:  AMPAC Total Score: 10    Treatment & Education:  1. Role of OT in acute setting.  2. Reviewed plan of care: explained that OT would introduce AE for feeding and grooming  3. Use of call light.  4. Attempted self feeding, pt holding glass to mouth but did not want any of food on tray.  Education:    Patient left HOB elevated with all lines intact and call button in reach    GOALS:   Multidisciplinary Problems     Occupational Therapy Goals        Problem: Occupational Therapy Goal    Goal Priority Disciplines Outcome Interventions   Occupational Therapy Goal     OT, PT/OT     Description:  Goals to be met by: 12-7-19     Patient will increase functional independence with ADLs by performing:    Feeding with Minimal Assistance and Assistive Devices as needed.  Grooming while seated with Stand-by Assistance and Assistive Devices as needed.                      History:     Past Medical History:   Diagnosis Date    Anxiety 1/25/2012    Arthritis of knee 1/25/2012    Back pain 1/25/2012    BPH (benign prostatic hyperplasia)  1/25/2012    Essential tremor 3/19/2014    Kyphoscoliosis 1/25/2012    Parkinson's disease        Past Surgical History:   Procedure Laterality Date    APPENDECTOMY      FRACTURE SURGERY Left Dec 18, 2014    HEMORRHOID SURGERY      PROSTATE SURGERY  2008    TURP and had renal insu from obsr    TONSILLECTOMY         Time Tracking:     OT Date of Treatment: 11/30/19  OT Start Time: 1205  OT Stop Time: 1224  OT Total Time (min): 19 min    Billable Minutes:Evaluation 11  Self Care/Home Management 8    CONSTANTINE Walden  11/30/2019

## 2019-11-30 NOTE — ED PROVIDER NOTES
Encounter Date: 11/29/2019    SCRIBE #1 NOTE: IRissa, am scribing for, and in the presence of, Dr. Duque.       History     Chief Complaint   Patient presents with    Urinary Tract Infection     currently on antibiotics for same, concern for new onset leg swelling since that time (Lt > Rt)    Leg Swelling       Time seen by provider: 6:55 PM on 11/29/2019    Toni Kamara is a 79 y.o. male with parkinson's disease and BPD who presents to the ED via EMS for the evaluation of bilateral leg swelling since starting Cipro recently for a UTI. He states he feels well and denies any n/v/d, CP, or SOB. He has no complaints. He denies any heart or renal failure. PSHx of TURP.  Sent to the emergency room for admission by primary care.      The history is provided by the patient and the EMS personnel.     Review of patient's allergies indicates:  No Known Allergies  Past Medical History:   Diagnosis Date    Anxiety 1/25/2012    Arthritis of knee 1/25/2012    Back pain 1/25/2012    BPH (benign prostatic hyperplasia) 1/25/2012    Essential tremor 3/19/2014    Kyphoscoliosis 1/25/2012    Parkinson's disease      Past Surgical History:   Procedure Laterality Date    APPENDECTOMY      FRACTURE SURGERY Left Dec 18, 2014    HEMORRHOID SURGERY      PROSTATE SURGERY  2008    TURP and had renal insu from obsr    TONSILLECTOMY       Family History   Problem Relation Age of Onset    Heart disease Mother 79        Coronary stent    Parkinsonism Father 83     Social History     Tobacco Use    Smoking status: Former Smoker    Smokeless tobacco: Never Used   Substance Use Topics    Alcohol use: No    Drug use: No     Review of Systems   Constitutional: Negative for fever.   Respiratory: Negative for shortness of breath.    Cardiovascular: Positive for leg swelling. Negative for chest pain.   Gastrointestinal: Negative for nausea and vomiting.   Genitourinary: Negative for dysuria.   Musculoskeletal: Negative for  back pain.   Skin: Negative for rash.   Neurological: Negative for weakness.       Physical Exam     Initial Vitals [11/29/19 1823]   BP Pulse Resp Temp SpO2   (!) 143/68 90 18 98.2 °F (36.8 °C) 99 %      MAP       --         Physical Exam    Nursing note and vitals reviewed.  Constitutional: He appears well-developed. He is not diaphoretic.  Non-toxic appearance. He does not have a sickly appearance. He does not appear ill. No distress.   Patient appears thin. He is in no acute distress.    HENT:   Head: Normocephalic and atraumatic.   Eyes: EOM are normal.   Neck: Normal range of motion. Neck supple. Normal range of motion present. No neck rigidity.   Cardiovascular: Normal rate, regular rhythm and normal heart sounds. Exam reveals no gallop and no friction rub.    No murmur heard.  Pulmonary/Chest: Breath sounds normal. No respiratory distress. He has no wheezes. He has no rhonchi. He has no rales.   Breath sounds are clear bilaterally.    Musculoskeletal: Normal range of motion. He exhibits edema.        Right upper leg: He exhibits edema.        Left upper leg: He exhibits edema.        Right lower leg: He exhibits edema.        Left lower leg: He exhibits edema.   Pitting edema BLE.    Neurological: He is alert and oriented to person, place, and time.   Resting BUE tremor.    Skin: Skin is warm and dry. No rash noted.   Psychiatric: He has a normal mood and affect. His behavior is normal. Judgment and thought content normal.         ED Course   Procedures  Labs Reviewed - No data to display       Imaging Results    None          Medical Decision Making:   History:   Old Medical Records: I decided to obtain old medical records.  Clinical Tests:   Lab Tests: Ordered and Reviewed            Scribe Attestation:   Scribe #1: I performed the above scribed service and the documentation accurately describes the services I performed. I attest to the accuracy of the note.        I, Dr. Duque, personally performed the  services described in this documentation. All medical record entries made by the scribe were at my direction and in my presence.  I have reviewed the chart and agree that the record reflects my personal performance and is accurate and complete.11:17 PM 11/29/2019         ED Course as of Nov 29 2020 Fri Nov 29, 2019 1852 SpO2: 99 % [EF]   1852 Resp: 18 [EF]   1852 Pulse: 90 [EF]   1852 Temp src: Oral [EF]   1852 Temp: 98.2 °F (36.8 °C) [EF]   1852 BP(!): 143/68 [EF]   2006 J suit to admit    [EF]      ED Course User Index  [EF] Ricky Duque MD                Clinical Impression:       ICD-10-CM ICD-9-CM   1. Complicated UTI (urinary tract infection) N39.0 599.0         Disposition:   Disposition: Admitted        79-year-old male recently diagnosed with Pseudomonas UTI presents with leg swelling after starting ciprofloxacin.  He is complaining of lower extremity edema and bilateral knee pain. He has no Achilles tenderness or pain to suggest tendon rupture at this time.  Uncertain etiology the bilateral lower extremity edema probably venous stasis uncertain if this is related to a ciprofloxacin but he was sent here by primary care for admission so he will be admitted with IV antibiotics.  Hospital Medicine to admit             Ricky Duque MD  11/29/19 2584

## 2019-11-30 NOTE — SUBJECTIVE & OBJECTIVE
Interval History: Patient seen and examined. No acute events. Doing well with no new complaints this morning.     Review of Systems   Constitutional: Negative for activity change, appetite change and fever.   HENT: Negative.    Eyes: Negative.    Respiratory: Negative for chest tightness, shortness of breath and wheezing.    Cardiovascular: Positive for leg swelling. Negative for chest pain and palpitations.   Gastrointestinal: Negative for abdominal distention, abdominal pain, blood in stool, diarrhea and vomiting.   Genitourinary: Negative for dysuria and hematuria.   Musculoskeletal: Positive for arthralgias.   Neurological: Negative for headaches.   Hematological: Negative for adenopathy.   Psychiatric/Behavioral: Negative for confusion.     Objective:     Vital Signs (Most Recent):  Temp: 97.7 °F (36.5 °C) (11/30/19 1121)  Pulse: 70 (11/30/19 1121)  Resp: 16 (11/30/19 1121)  BP: (!) 163/85 (11/30/19 1121)  SpO2: (!) 94 % (11/30/19 1121) Vital Signs (24h Range):  Temp:  [96.9 °F (36.1 °C)-98.2 °F (36.8 °C)] 97.7 °F (36.5 °C)  Pulse:  [70-90] 70  Resp:  [16-18] 16  SpO2:  [92 %-99 %] 94 %  BP: (134-186)/(59-88) 163/85     Weight: 51.7 kg (114 lb)  Body mass index is 17.85 kg/m².    Intake/Output Summary (Last 24 hours) at 11/30/2019 1217  Last data filed at 11/29/2019 2121  Gross per 24 hour   Intake 50 ml   Output --   Net 50 ml      Physical Exam   Constitutional: He is oriented to person, place, and time. He appears well-developed and well-nourished. No distress.   HENT:   Head: Normocephalic and atraumatic.   Eyes: Pupils are equal, round, and reactive to light.   Neck: Neck supple. No thyromegaly present.   Cardiovascular: Normal rate and regular rhythm. Exam reveals no gallop and no friction rub.   No murmur heard.  Pulmonary/Chest: Effort normal and breath sounds normal. No respiratory distress. He has no wheezes.   Abdominal: Soft. Bowel sounds are normal. He exhibits no distension. There is no  tenderness. There is no guarding.   Musculoskeletal: Normal range of motion. He exhibits edema.   Neurological: He is alert and oriented to person, place, and time.   Skin: Skin is warm and dry. No erythema.   Psychiatric: He has a normal mood and affect.       Significant Labs:   CBC:   Recent Labs   Lab 11/29/19  1908 11/30/19  0613   WBC 5.88 4.54   HGB 10.5* 10.2*   HCT 33.2* 32.7*    264       Significant Imaging: I have reviewed all pertinent imaging results/findings within the past 24 hours.

## 2019-11-30 NOTE — SUBJECTIVE & OBJECTIVE
Past Medical History:   Diagnosis Date    Anxiety 1/25/2012    Arthritis of knee 1/25/2012    Back pain 1/25/2012    BPH (benign prostatic hyperplasia) 1/25/2012    Essential tremor 3/19/2014    Kyphoscoliosis 1/25/2012    Parkinson's disease        Past Surgical History:   Procedure Laterality Date    APPENDECTOMY      FRACTURE SURGERY Left Dec 18, 2014    HEMORRHOID SURGERY      PROSTATE SURGERY  2008    TURP and had renal insu from obsr    TONSILLECTOMY         Review of patient's allergies indicates:  No Known Allergies    No current facility-administered medications on file prior to encounter.      Current Outpatient Medications on File Prior to Encounter   Medication Sig    carbidopa-levodopa  mg (SINEMET)  mg per tablet Take 2 tablets by mouth 4 (four) times daily.     ciprofloxacin HCl (CIPRO) 500 MG tablet Take 1 tablet (500 mg total) by mouth 2 (two) times daily. for 7 days    clonazePAM (KLONOPIN) 1 MG tablet Take 1.5 tablets (1.5 mg total) by mouth every evening.    losartan (COZAAR) 50 MG tablet Take 1 tablet (50 mg total) by mouth once daily. (Patient taking differently: Take 50 mg by mouth daily as needed. )    [DISCONTINUED] oxyCODONE-acetaminophen (PERCOCET) 5-325 mg per tablet Take 1 tablet by mouth every 12 (twelve) hours as needed for Pain.     Family History     Problem Relation (Age of Onset)    Heart disease Mother (79)    Parkinsonism Father (83)        Tobacco Use    Smoking status: Former Smoker    Smokeless tobacco: Never Used   Substance and Sexual Activity    Alcohol use: No    Drug use: No    Sexual activity: Not on file     Review of Systems   Constitutional: Positive for fatigue. Negative for activity change, appetite change, chills and fever.   HENT: Negative for congestion, postnasal drip, sore throat and trouble swallowing.    Eyes: Negative for photophobia and visual disturbance.   Respiratory: Negative for cough, shortness of breath and wheezing.     Cardiovascular: Positive for leg swelling. Negative for chest pain and palpitations.   Gastrointestinal: Negative for abdominal distention, constipation, diarrhea, nausea and vomiting.   Genitourinary: Positive for dysuria. Negative for difficulty urinating, flank pain, frequency and hematuria.   Musculoskeletal: Negative for arthralgias and myalgias.   Skin: Negative for color change.   Neurological: Negative for dizziness, weakness, light-headedness and headaches.   Psychiatric/Behavioral: Negative for confusion. The patient is not nervous/anxious.      Objective:     Vital Signs (Most Recent):  Temp: 98.2 °F (36.8 °C) (11/29/19 1823)  Pulse: 80 (11/29/19 2028)  Resp: 18 (11/29/19 1823)  BP: (!) 179/88 (11/29/19 2028)  SpO2: 96 % (11/29/19 2028) Vital Signs (24h Range):  Temp:  [98.2 °F (36.8 °C)] 98.2 °F (36.8 °C)  Pulse:  [80-90] 80  Resp:  [18] 18  SpO2:  [96 %-99 %] 96 %  BP: (143-179)/(68-88) 179/88     Weight: 52.2 kg (115 lb)  Body mass index is 18.01 kg/m².    Physical Exam   Constitutional: He is oriented to person, place, and time. He appears well-developed and well-nourished. No distress.   HENT:   Head: Normocephalic and atraumatic.   Eyes: Pupils are equal, round, and reactive to light. Conjunctivae and EOM are normal.   Neck: Normal range of motion. Neck supple.   Cardiovascular: Normal rate, regular rhythm, normal heart sounds and intact distal pulses.   No murmur heard.  Pulmonary/Chest: Effort normal and breath sounds normal. No respiratory distress. He exhibits no tenderness.   Abdominal: Soft. Bowel sounds are normal. He exhibits no distension. There is no tenderness.   Musculoskeletal: Normal range of motion. He exhibits edema (2+ pitting BLEs). He exhibits no tenderness.   Neurological: He is alert and oriented to person, place, and time. No cranial nerve deficit.   +tremor   Skin: Skin is warm and dry. Capillary refill takes less than 2 seconds. No erythema.   Psychiatric: He has a normal  mood and affect. His behavior is normal. Judgment and thought content normal.         CRANIAL NERVES     CN III, IV, VI   Pupils are equal, round, and reactive to light.  Extraocular motions are normal.        Significant Labs:   CBC:   Recent Labs   Lab 11/29/19 1908   WBC 5.88   HGB 10.5*   HCT 33.2*        CMP:   Recent Labs   Lab 11/29/19 1908      K 4.3      CO2 30*   GLU 97   BUN 22   CREATININE 1.2   CALCIUM 8.7   PROT 6.0   ALBUMIN 2.8*   BILITOT 0.3   ALKPHOS 88   AST 40   ALT 11   ANIONGAP 6*   EGFRNONAA 58*     Cardiac Markers:   Recent Labs   Lab 11/29/19 1908   *     Urine Studies:   Recent Labs   Lab 11/29/19 2005   COLORU Yellow   APPEARANCEUA Clear   PHUR 7.0   SPECGRAV 1.020   PROTEINUA Negative   GLUCUA Negative   KETONESU Negative   BILIRUBINUA Negative   OCCULTUA Negative   NITRITE Negative   UROBILINOGEN Negative   LEUKOCYTESUR Negative

## 2019-11-30 NOTE — PROGRESS NOTES
[facility] Hospital Medicine  Progress Note    Patient Name: Toni Kamara  MRN: [id number]  Patient Class: IP- Inpatient   Admission Date: 11/29/2019  Length of Stay: 1 days  Attending Physician: Efrain Manuel MD  Primary Care Provider: Harinder Stout MD        Subjective:     Principal Problem:Complicated UTI (urinary tract infection)        HPI:  Toni Kamara is a 79-year-old male with PMHx significant for Parkinson's disease, BPH, HTN, arthritis, and anxiety.  He presented in the ED the recommendation of his PCP further evaluation of leg swelling and fatigue.  Pt was recently D/C on 11/26 after inpatient treatment for pseudomonal UTI.  Pt began oral Cipro on the morning of 11/26 after culture and sensitivity results.  Pt was D/C on oral Cipro and has been compliant with medications since that time.  His daughter is concerned over increasing fatigue and weakness as well as bilateral lower extremity edema. Pt denies any Hx of edema. He also endorses some cracking and popping to his left knee as well as left knee pain. The pain is not any sensation, however the popping and cracking is.  His daughter became concerned over possibility of adverse reaction to Cipro.  His PCP was phoned, symptoms were reported, and he was recommended to follow up in ED for further evaluation and IV antibiotic therapy for treatment of his pseudomonal UTI given limited sensitivities.  He was D/C on a 5 day course and has completed 3 of 5 days.  Pt reports ongoing dysuria since discharge, though did urinate in ED without symptoms.  He denies any chest pain, SOB, cough, nausea, vomiting, abdominal pain, HA, or falls.  He denies any Hx of blood clots and denies any Hx of lower extremity edema. She is admitted to service of hospital Medicine for additional evaluation and management.  Other pertinent medical Hx as below:    Overview/Hospital Course:  No notes on file    Interval History: Patient seen and examined. No acute  events. Doing well with no new complaints this morning.     Review of Systems   Constitutional: Negative for activity change, appetite change and fever.   HENT: Negative.    Eyes: Negative.    Respiratory: Negative for chest tightness, shortness of breath and wheezing.    Cardiovascular: Positive for leg swelling. Negative for chest pain and palpitations.   Gastrointestinal: Negative for abdominal distention, abdominal pain, blood in stool, diarrhea and vomiting.   Genitourinary: Negative for dysuria and hematuria.   Musculoskeletal: Positive for arthralgias.   Neurological: Negative for headaches.   Hematological: Negative for adenopathy.   Psychiatric/Behavioral: Negative for confusion.     Objective:     Vital Signs (Most Recent):  Temp: 97.7 °F (36.5 °C) (11/30/19 1121)  Pulse: 70 (11/30/19 1121)  Resp: 16 (11/30/19 1121)  BP: (!) 163/85 (11/30/19 1121)  SpO2: (!) 94 % (11/30/19 1121) Vital Signs (24h Range):  Temp:  [96.9 °F (36.1 °C)-98.2 °F (36.8 °C)] 97.7 °F (36.5 °C)  Pulse:  [70-90] 70  Resp:  [16-18] 16  SpO2:  [92 %-99 %] 94 %  BP: (134-186)/(59-88) 163/85     Weight: 51.7 kg (114 lb)  Body mass index is 17.85 kg/m².    Intake/Output Summary (Last 24 hours) at 11/30/2019 1217  Last data filed at 11/29/2019 2121  Gross per 24 hour   Intake 50 ml   Output --   Net 50 ml      Physical Exam   Constitutional: He is oriented to person, place, and time. He appears well-developed and well-nourished. No distress.   HENT:   Head: Normocephalic and atraumatic.   Eyes: Pupils are equal, round, and reactive to light.   Neck: Neck supple. No thyromegaly present.   Cardiovascular: Normal rate and regular rhythm. Exam reveals no gallop and no friction rub.   No murmur heard.  Pulmonary/Chest: Effort normal and breath sounds normal. No respiratory distress. He has no wheezes.   Abdominal: Soft. Bowel sounds are normal. He exhibits no distension. There is no tenderness. There is no guarding.   Musculoskeletal: Normal  range of motion. He exhibits edema.   Neurological: He is alert and oriented to person, place, and time.   Skin: Skin is warm and dry. No erythema.   Psychiatric: He has a normal mood and affect.       Significant Labs:   CBC:   Recent Labs   Lab 11/29/19  1908 11/30/19  0613   WBC 5.88 4.54   HGB 10.5* 10.2*   HCT 33.2* 32.7*    264       Significant Imaging: I have reviewed all pertinent imaging results/findings within the past 24 hours.      Assessment/Plan:      * Complicated UTI (urinary tract infection)  Pt with pseudomonal UTI.  Family feels he is not tolerating Cipro.  PCP was contacted and it was recommended he be evaluated in the ED initiated on IV Abx if not tolerating Cipro.  Pt completed 3/5 days Cipro therapy.  UA has cleared up, though Pt reports continued dysuria.  We will continue treatment with IV Merrem to which his pseudomonal UTI is susceptible.  Consult with ID for recommendations regarding ongoing antibiotic therapy/duration of therapy in the setting of Pt's possible intolerance of Cipro.  Appreciate recommendations.    Lower extremity edema  Uncertain etiology, seems unlikely this is related to Cipro.  Check bilateral lower extremity ultrasound, TTE, and CXR.  Elevate lower extremities.      Anxiety disorder   Chronic, stable.  Continue clonazepam as per outpatient regimen.  Monitor clinically      Weakness generalized  Progressive waeakness per daughter.  Consult PT/OT.  Maintain fall precautions.        Essential hypertension  Chronic, BP is acutely uncontrolled and elevated.  Pt states he has not been on his oral antihypertensive due to several low BP readings and was told to hold this medication.  Will monitor his BP and if remains elevated will likely restart his ARB.      Parkinson's disease dementia  Chronic, stable.  We will continue his Sinemet her outpatient regimen.  Pain fall precautions.  Utilize nonpharmacologic modalities to prevent delirium for which he is high  risk.      BPH (benign prostatic hyperplasia)  Chronic issue, associated with pseudomonal UTI.  Not on any medications for his BPH.  Consider urology follow-up.        VTE Risk Mitigation (From admission, onward)         Ordered     enoxaparin injection 40 mg  Daily      11/29/19 2207     IP VTE HIGH RISK PATIENT  Once      11/29/19 2207                      Toni Osullivan MD  Department of Hospital Medicine   [facility]

## 2019-11-30 NOTE — PLAN OF CARE
Problem: Physical Therapy Goal  Goal: Physical Therapy Goal  Description  Goals to be met by: 19     Patient will increase functional independence with mobility by performin. Supine to sit with Modified [place]  2. Sit to stand transfer with Stand-by Assistance  3. Bed to chair transfer with Stand-by Assistance using Rolling Walker  4. Gait  x 300 feet with Stand-by Assistance using Rolling Walker.   5. Lower extremity exercise program x20 reps per handout, with supervision     Outcome: Ongoing, Progressing   PT for functional mob training and/ or ex; pt educated on safety precautions

## 2019-11-30 NOTE — ASSESSMENT & PLAN NOTE
Chronic issue, associated with pseudomonal UTI.  Not on any medications for his BPH.  Consider urology follow-up.

## 2019-11-30 NOTE — PT/OT/SLP EVAL
Physical Therapy Evaluation    Patient Name:  Toni Kamara   MRN:  [id number]    Recommendations:     Discharge Recommendations:  home health OT, home health PT, home with home health   Discharge Equipment Recommendations: wheelchair(provider mentioned pt sometimes freezes out and would be best to have a W/C)   Barriers to discharge: None    Assessment:     Toni Kamara is a 79 y.o. male admitted with a medical diagnosis of Complicated UTI (urinary tract infection).  He presents with the following impairments/functional limitations:  weakness, impaired self care skills, impaired balance, decreased coordination, decreased safety awareness, impaired endurance, impaired functional mobilty, impaired coordination, decreased lower extremity function, impaired cardiopulmonary response to activity, gait instability; pt able to amb 250 ft with a RW, min A, mod unsteady with kyphotic posture; pt will benefit from skilled acute PT services to improve current level of functional mob and improve overall capacity to perform activities.    Rehab Prognosis: Good; patient would benefit from acute skilled PT services to address these deficits and reach maximum level of function.    Recent Surgery: * No surgery found *      Plan:     During this hospitalization, patient to be seen 6 x/week to address the identified rehab impairments via gait training, therapeutic activities, therapeutic exercises and progress toward the following goals:    · Plan of Care Expires:  12/21/19    Subjective     Chief Complaint: swollen legs but not painful or tender  Patient/Family Comments/goals: wants to get up and walk with PT  Pain/Comfort:  · Pain Rating 1: 0/10  · Pain Rating Post-Intervention 1: 0/10    Patients cultural, spiritual, Zoroastrianism conflicts given the current situation: no    Living Environment:  Lives in a 1-jade home with a small ramp with 24-hr caregiver assistance qd; pt does not drive but intends to in the future, PT advised pt to  talk to doctor about driving as pt needs to have good strength, endurance and reflexes to be able to drive safely.  Prior to admission, patients level of function was mod (I) with a rollator, pt still walks outside the house occasionally.  Equipment used at home: bedside commode, rollator, lift device, grab bar, shower chair.  DME owned (not currently used): none.  Upon discharge, patient will have assistance from caregiver.    Objective:     Communicated with PAO Schultz prior to session.  Patient found HOB elevated with peripheral IV  upon PT entry to room.    General Precautions: Standard, fall(pt tends to overestimate his capabilities)   Orthopedic Precautions:N/A   Braces: N/A     Exams:  · Cognitive Exam:  Patient is oriented to Person, Place, Time and Situation  · Gross Motor Coordination:  min impaired  · Postural Exam:  Patient presented with the following abnormalities:    · -       Rounded shoulders  · -       Forward head  · -       Kyphosis  · Sensation:    · -       Intact  · Skin Integrity/Edema:      · -       Edema: Moderate to (B) LE  · RLE ROM: WFL  · RLE Strength: graded 3/5 to 3+/5  · LLE ROM: WFL        RLE ROM: graded 3/5 to 3+/5    Functional Mobility:  · Bed Mobility:     · Rolling Right: contact guard assistance  · Supine to Sit: contact guard assistance  · Transfers:     · Sit to Stand:  minimum assistance with rolling walker  · Bed to Chair: minimum assistance with  rolling walker  using  Step Transfer  · Gait: 250 ft with a RW, min A, kyphotic posture, slow-paced, mod unsteady  · Balance: Sitting: G/F; Standing: F/ P    Therapeutic Activities and Exercises:  Functional mobility training as above; pt educated on safety precautions and mobility techniques, as well as the role and benefits of PT and mobilization.      AM-PAC 6 CLICK MOBILITY  Total Score:17     Patient left up in chair with all lines intact, call button in reach, PAO Schultz notified and caregiver present.    GOALS:    Multidisciplinary Problems     Physical Therapy Goals        Problem: Physical Therapy Goal    Goal Priority Disciplines Outcome Goal Variances Interventions   Physical Therapy Goal     PT, PT/OT Ongoing, Progressing     Description:  Goals to be met by: 19     Patient will increase functional independence with mobility by performin. Supine to sit with Modified [place]  2. Sit to stand transfer with Stand-by Assistance  3. Bed to chair transfer with Stand-by Assistance using Rolling Walker  4. Gait  x 300 feet with Stand-by Assistance using Rolling Walker.   5. Lower extremity exercise program x20 reps per handout, with supervision                      History:     Past Medical History:   Diagnosis Date    Anxiety 2012    Arthritis of knee 2012    Back pain 2012    BPH (benign prostatic hyperplasia) 2012    Essential tremor 3/19/2014    Kyphoscoliosis 2012    Parkinson's disease        Past Surgical History:   Procedure Laterality Date    APPENDECTOMY      FRACTURE SURGERY Left Dec 18, 2014    HEMORRHOID SURGERY      PROSTATE SURGERY      TURP and had renal insu from obsr    TONSILLECTOMY         Time Tracking:     PT Received On: 19  PT Start Time: 1159     PT Stop Time: 1238  PT Total Time (min): 39 min     Billable Minutes: Evaluation 12, Gait Training 13 and Therapeutic Activity 14      Thomas Fernandez, PT  2019

## 2019-11-30 NOTE — ASSESSMENT & PLAN NOTE
Chronic, BP is acutely uncontrolled and elevated.  Pt states he has not been on his oral antihypertensive due to several low BP readings and was told to hold this medication.  Will monitor his BP and if remains elevated will likely restart his ARB.

## 2019-11-30 NOTE — HPI
Toni Kamara is a 79-year-old male with PMHx significant for Parkinson's disease, BPH, HTN, arthritis, and anxiety.  He presented in the ED the recommendation of his PCP further evaluation of leg swelling and fatigue.  Pt was recently D/C on 11/26 after inpatient treatment for pseudomonal UTI.  Pt began oral Cipro on the morning of 11/26 after culture and sensitivity results.  Pt was D/C on oral Cipro and has been compliant with medications since that time.  His daughter is concerned over increasing fatigue and weakness as well as bilateral lower extremity edema. Pt denies any Hx of edema. He also endorses some cracking and popping to his left knee as well as left knee pain. The pain is not any sensation, however the popping and cracking is.  His daughter became concerned over possibility of adverse reaction to Cipro.  His PCP was phoned, symptoms were reported, and he was recommended to follow up in ED for further evaluation and IV antibiotic therapy for treatment of his pseudomonal UTI given limited sensitivities.  He was D/C on a 5 day course and has completed 3 of 5 days.  Pt reports ongoing dysuria since discharge, though did urinate in ED without symptoms.  He denies any chest pain, SOB, cough, nausea, vomiting, abdominal pain, HA, or falls.  He denies any Hx of blood clots and denies any Hx of lower extremity edema. She is admitted to service of hospital Medicine for additional evaluation and management.  Other pertinent medical Hx as below:

## 2019-11-30 NOTE — ASSESSMENT & PLAN NOTE
Pt with pseudomonal UTI.  Family feels he is not tolerating Cipro.  PCP was contacted and it was recommended he be evaluated in the ED initiated on IV Abx if not tolerating Cipro.  Pt completed 3/5 days Cipro therapy.  UA has cleared up, though Pt reports continued dysuria.  We will continue treatment with IV Merrem to which his pseudomonal UTI is susceptible.  Consult with ID for recommendations regarding ongoing antibiotic therapy/duration of therapy in the setting of Pt's possible intolerance of Cipro.  Appreciate recommendations.

## 2019-12-01 LAB
ALBUMIN SERPL BCP-MCNC: 2.5 G/DL (ref 3.5–5.2)
ALP SERPL-CCNC: 79 U/L (ref 55–135)
ALT SERPL W/O P-5'-P-CCNC: 6 U/L (ref 10–44)
ANION GAP SERPL CALC-SCNC: 7 MMOL/L (ref 8–16)
AST SERPL-CCNC: 18 U/L (ref 10–40)
BASOPHILS # BLD AUTO: 0.03 K/UL (ref 0–0.2)
BASOPHILS NFR BLD: 0.9 % (ref 0–1.9)
BILIRUB SERPL-MCNC: 0.4 MG/DL (ref 0.1–1)
BUN SERPL-MCNC: 25 MG/DL (ref 8–23)
CALCIUM SERPL-MCNC: 8.6 MG/DL (ref 8.7–10.5)
CHLORIDE SERPL-SCNC: 101 MMOL/L (ref 95–110)
CO2 SERPL-SCNC: 31 MMOL/L (ref 23–29)
CREAT SERPL-MCNC: 1.1 MG/DL (ref 0.5–1.4)
DIFFERENTIAL METHOD: ABNORMAL
EOSINOPHIL # BLD AUTO: 0 K/UL (ref 0–0.5)
EOSINOPHIL NFR BLD: 1.1 % (ref 0–8)
ERYTHROCYTE [DISTWIDTH] IN BLOOD BY AUTOMATED COUNT: 13.4 % (ref 11.5–14.5)
EST. GFR  (AFRICAN AMERICAN): >60 ML/MIN/1.73 M^2
EST. GFR  (NON AFRICAN AMERICAN): >60 ML/MIN/1.73 M^2
GLUCOSE SERPL-MCNC: 89 MG/DL (ref 70–110)
HCT VFR BLD AUTO: 32.8 % (ref 40–54)
HGB BLD-MCNC: 10.5 G/DL (ref 14–18)
IMM GRANULOCYTES # BLD AUTO: 0.02 K/UL (ref 0–0.04)
LYMPHOCYTES # BLD AUTO: 1 K/UL (ref 1–4.8)
LYMPHOCYTES NFR BLD: 27.6 % (ref 18–48)
MAGNESIUM SERPL-MCNC: 1.7 MG/DL (ref 1.6–2.6)
MCH RBC QN AUTO: 30.9 PG (ref 27–31)
MCHC RBC AUTO-ENTMCNC: 32 G/DL (ref 32–36)
MCV RBC AUTO: 97 FL (ref 82–98)
MONOCYTES # BLD AUTO: 0.3 K/UL (ref 0.3–1)
MONOCYTES NFR BLD: 8 % (ref 4–15)
NEUTROPHILS # BLD AUTO: 2.2 K/UL (ref 1.8–7.7)
NEUTROPHILS NFR BLD: 61.8 % (ref 38–73)
NRBC BLD-RTO: 0 /100 WBC
PLATELET # BLD AUTO: 265 K/UL (ref 150–350)
PMV BLD AUTO: 9.8 FL (ref 9.2–12.9)
POTASSIUM SERPL-SCNC: 4 MMOL/L (ref 3.5–5.1)
PROT SERPL-MCNC: 5.5 G/DL (ref 6–8.4)
RBC # BLD AUTO: 3.4 M/UL (ref 4.6–6.2)
SODIUM SERPL-SCNC: 139 MMOL/L (ref 136–145)
WBC # BLD AUTO: 3.52 K/UL (ref 3.9–12.7)

## 2019-12-01 PROCEDURE — 36415 COLL VENOUS BLD VENIPUNCTURE: CPT

## 2019-12-01 PROCEDURE — 85025 COMPLETE CBC W/AUTO DIFF WBC: CPT

## 2019-12-01 PROCEDURE — 63600175 PHARM REV CODE 636 W HCPCS: Performed by: NURSE PRACTITIONER

## 2019-12-01 PROCEDURE — 25000003 PHARM REV CODE 250: Performed by: INTERNAL MEDICINE

## 2019-12-01 PROCEDURE — 83735 ASSAY OF MAGNESIUM: CPT

## 2019-12-01 PROCEDURE — 63600175 PHARM REV CODE 636 W HCPCS: Performed by: HOSPITALIST

## 2019-12-01 PROCEDURE — 11000001 HC ACUTE MED/SURG PRIVATE ROOM

## 2019-12-01 PROCEDURE — 80053 COMPREHEN METABOLIC PANEL: CPT

## 2019-12-01 PROCEDURE — 25000003 PHARM REV CODE 250: Performed by: NURSE PRACTITIONER

## 2019-12-01 RX ORDER — TAMSULOSIN HYDROCHLORIDE 0.4 MG/1
0.4 CAPSULE ORAL DAILY
Status: DISCONTINUED | OUTPATIENT
Start: 2019-12-01 | End: 2019-12-04 | Stop reason: HOSPADM

## 2019-12-01 RX ADMIN — CARBIDOPA AND LEVODOPA 2 TABLET: 25; 100 TABLET ORAL at 02:12

## 2019-12-01 RX ADMIN — OXYCODONE AND ACETAMINOPHEN 1 TABLET: 5; 325 TABLET ORAL at 12:12

## 2019-12-01 RX ADMIN — CARBIDOPA AND LEVODOPA 2 TABLET: 25; 100 TABLET ORAL at 09:12

## 2019-12-01 RX ADMIN — MEROPENEM AND SODIUM CHLORIDE 1 G: 1 INJECTION, SOLUTION INTRAVENOUS at 03:12

## 2019-12-01 RX ADMIN — OXYCODONE AND ACETAMINOPHEN 1 TABLET: 5; 325 TABLET ORAL at 03:12

## 2019-12-01 RX ADMIN — CLONAZEPAM 1.5 MG: 0.5 TABLET ORAL at 08:12

## 2019-12-01 RX ADMIN — SENNOSIDES AND DOCUSATE SODIUM 1 TABLET: 8.6; 5 TABLET ORAL at 09:12

## 2019-12-01 RX ADMIN — ENOXAPARIN SODIUM 40 MG: 100 INJECTION SUBCUTANEOUS at 05:12

## 2019-12-01 RX ADMIN — CARBIDOPA AND LEVODOPA 2 TABLET: 25; 100 TABLET ORAL at 05:12

## 2019-12-01 RX ADMIN — SENNOSIDES AND DOCUSATE SODIUM 1 TABLET: 8.6; 5 TABLET ORAL at 08:12

## 2019-12-01 RX ADMIN — OXYCODONE AND ACETAMINOPHEN 1 TABLET: 5; 325 TABLET ORAL at 09:12

## 2019-12-01 RX ADMIN — CARBIDOPA AND LEVODOPA 2 TABLET: 25; 100 TABLET ORAL at 01:12

## 2019-12-01 RX ADMIN — TAMSULOSIN HYDROCHLORIDE 0.4 MG: 0.4 CAPSULE ORAL at 11:12

## 2019-12-01 RX ADMIN — OXYCODONE AND ACETAMINOPHEN 1 TABLET: 5; 325 TABLET ORAL at 07:12

## 2019-12-01 NOTE — ASSESSMENT & PLAN NOTE
Uncertain etiology, seems unlikely this is related to Cipro.  Check bilateral lower extremity ultrasound, TTE, and CXR.  Elevate lower extremities.  - resolved after lasix x1

## 2019-12-01 NOTE — PROGRESS NOTES
[facility] Medicine  Progress Note    Patient Name: Toni Kamara  MRN: [id number]  Patient Class: IP- Inpatient   Admission Date: 11/29/2019  Length of Stay: 2 days  Attending Physician: Efrain Manuel MD  Primary Care Provider: Harinder Stout MD        Subjective:     Principal Problem:Complicated UTI (urinary tract infection)        HPI:  Toni Kamara is a 79-year-old male with PMHx significant for Parkinson's disease, BPH, HTN, arthritis, and anxiety.  He presented in the ED the recommendation of his PCP further evaluation of leg swelling and fatigue.  Pt was recently D/C on 11/26 after inpatient treatment for pseudomonal UTI.  Pt began oral Cipro on the morning of 11/26 after culture and sensitivity results.  Pt was D/C on oral Cipro and has been compliant with medications since that time.  His daughter is concerned over increasing fatigue and weakness as well as bilateral lower extremity edema. Pt denies any Hx of edema. He also endorses some cracking and popping to his left knee as well as left knee pain. The pain is not any sensation, however the popping and cracking is.  His daughter became concerned over possibility of adverse reaction to Cipro.  His PCP was phoned, symptoms were reported, and he was recommended to follow up in ED for further evaluation and IV antibiotic therapy for treatment of his pseudomonal UTI given limited sensitivities.  He was D/C on a 5 day course and has completed 3 of 5 days.  Pt reports ongoing dysuria since discharge, though did urinate in ED without symptoms.  He denies any chest pain, SOB, cough, nausea, vomiting, abdominal pain, HA, or falls.  He denies any Hx of blood clots and denies any Hx of lower extremity edema. She is admitted to service of hospital Medicine for additional evaluation and management.  Other pertinent medical Hx as below:    Overview/Hospital Course:  No notes on file    Interval History: Patient seen and examined. Seems  more weak today but working well with PT.  Need to check PVR. Afebrile.     Review of Systems   Constitutional: Positive for fatigue. Negative for activity change, appetite change and fever.   HENT: Negative.    Eyes: Negative.    Respiratory: Negative for chest tightness, shortness of breath and wheezing.    Cardiovascular: Positive for leg swelling. Negative for chest pain and palpitations.   Gastrointestinal: Negative for abdominal distention, abdominal pain, blood in stool, diarrhea and vomiting.   Genitourinary: Negative for dysuria and hematuria.   Neurological: Negative for headaches.   Hematological: Negative for adenopathy.   Psychiatric/Behavioral: Negative for confusion.     Objective:     Vital Signs (Most Recent):  Temp: 96.7 °F (35.9 °C) (12/01/19 0726)  Pulse: 71 (12/01/19 0726)  Resp: 12 (12/01/19 0344)  BP: 136/62 (12/01/19 0726)  SpO2: (!) 93 % (12/01/19 0726) Vital Signs (24h Range):  Temp:  [96.7 °F (35.9 °C)-97.7 °F (36.5 °C)] 96.7 °F (35.9 °C)  Pulse:  [69-93] 71  Resp:  [12-17] 12  SpO2:  [93 %-97 %] 93 %  BP: (131-163)/(62-85) 136/62     Weight: 51.7 kg (114 lb)  Body mass index is 17.85 kg/m².    Intake/Output Summary (Last 24 hours) at 12/1/2019 1005  Last data filed at 12/1/2019 0350  Gross per 24 hour   Intake 1200 ml   Output --   Net 1200 ml      Physical Exam   Constitutional: He is oriented to person, place, and time. He appears well-developed and well-nourished. No distress.   HENT:   Head: Normocephalic and atraumatic.   Eyes: Pupils are equal, round, and reactive to light.   Neck: Neck supple. No thyromegaly present.   Cardiovascular: Normal rate and regular rhythm. Exam reveals no gallop and no friction rub.   No murmur heard.  Pulmonary/Chest: Effort normal and breath sounds normal. No respiratory distress. He has no wheezes.   Abdominal: Soft. Bowel sounds are normal. He exhibits no distension. There is no tenderness. There is no guarding.   Musculoskeletal: Normal range of  motion. He exhibits no edema.   Neurological: He is alert and oriented to person, place, and time.   Skin: Skin is warm and dry. No erythema.   Psychiatric: He has a normal mood and affect.       Significant Labs:   CBC:   Recent Labs   Lab 11/29/19  1908 11/30/19  0613 12/01/19  0551   WBC 5.88 4.54 3.52*   HGB 10.5* 10.2* 10.5*   HCT 33.2* 32.7* 32.8*    264 265       Significant Imaging: I have reviewed all pertinent imaging results/findings within the past 24 hours.      Assessment/Plan:      * Complicated UTI (urinary tract infection)  Pt with pseudomonal UTI.    Dr caraballo discussed oral therapy with daughter but she is adamant that he not get cipro  Continue IV abx for now  Potential DC tomorrow on oral therapy if family amenable. 2 weeks recommended by ID.     Lower extremity edema  Uncertain etiology, seems unlikely this is related to Cipro.  Check bilateral lower extremity ultrasound, TTE, and CXR.  Elevate lower extremities.  - resolved after lasix x1      Anxiety disorder   Chronic, stable.  Continue clonazepam as per outpatient regimen.  Monitor clinically      Weakness generalized  Progressive waeakness per daughter.  Consult PT/OT.  Maintain fall precautions.        Essential hypertension  Chronic, BP is acutely uncontrolled and elevated.  Pt states he has not been on his oral antihypertensive due to several low BP readings and was told to hold this medication.  Will monitor his BP and if remains elevated will likely restart his ARB.      Parkinson's disease dementia  Chronic, stable.  We will continue his Sinemet her outpatient regimen.  Pain fall precautions.  Utilize nonpharmacologic modalities to prevent delirium for which he is high risk.      BPH (benign prostatic hyperplasia)  Add flomax and check PVR        VTE Risk Mitigation (From admission, onward)         Ordered     enoxaparin injection 40 mg  Daily      11/29/19 2207     IP VTE HIGH RISK PATIENT  Once      11/29/19 2207                       Toni Osullivan MD  Department of Hospital Medicine   [facility]

## 2019-12-01 NOTE — PLAN OF CARE
Strict I&Os,elevate lower extremities,weigh daily,neuro checks,monitor BP and lab results,safety maintained,bed low with wheels locked,call light in reach,will continue to monitor.

## 2019-12-01 NOTE — SUBJECTIVE & OBJECTIVE
Interval History: Patient seen and examined. Seems more weak today but working well with PT.  Need to check PVR. Afebrile.     Review of Systems   Constitutional: Positive for fatigue. Negative for activity change, appetite change and fever.   HENT: Negative.    Eyes: Negative.    Respiratory: Negative for chest tightness, shortness of breath and wheezing.    Cardiovascular: Positive for leg swelling. Negative for chest pain and palpitations.   Gastrointestinal: Negative for abdominal distention, abdominal pain, blood in stool, diarrhea and vomiting.   Genitourinary: Negative for dysuria and hematuria.   Neurological: Negative for headaches.   Hematological: Negative for adenopathy.   Psychiatric/Behavioral: Negative for confusion.     Objective:     Vital Signs (Most Recent):  Temp: 96.7 °F (35.9 °C) (12/01/19 0726)  Pulse: 71 (12/01/19 0726)  Resp: 12 (12/01/19 0344)  BP: 136/62 (12/01/19 0726)  SpO2: (!) 93 % (12/01/19 0726) Vital Signs (24h Range):  Temp:  [96.7 °F (35.9 °C)-97.7 °F (36.5 °C)] 96.7 °F (35.9 °C)  Pulse:  [69-93] 71  Resp:  [12-17] 12  SpO2:  [93 %-97 %] 93 %  BP: (131-163)/(62-85) 136/62     Weight: 51.7 kg (114 lb)  Body mass index is 17.85 kg/m².    Intake/Output Summary (Last 24 hours) at 12/1/2019 1005  Last data filed at 12/1/2019 0350  Gross per 24 hour   Intake 1200 ml   Output --   Net 1200 ml      Physical Exam   Constitutional: He is oriented to person, place, and time. He appears well-developed and well-nourished. No distress.   HENT:   Head: Normocephalic and atraumatic.   Eyes: Pupils are equal, round, and reactive to light.   Neck: Neck supple. No thyromegaly present.   Cardiovascular: Normal rate and regular rhythm. Exam reveals no gallop and no friction rub.   No murmur heard.  Pulmonary/Chest: Effort normal and breath sounds normal. No respiratory distress. He has no wheezes.   Abdominal: Soft. Bowel sounds are normal. He exhibits no distension. There is no tenderness. There is  no guarding.   Musculoskeletal: Normal range of motion. He exhibits no edema.   Neurological: He is alert and oriented to person, place, and time.   Skin: Skin is warm and dry. No erythema.   Psychiatric: He has a normal mood and affect.       Significant Labs:   CBC:   Recent Labs   Lab 11/29/19  1908 11/30/19  0613 12/01/19  0551   WBC 5.88 4.54 3.52*   HGB 10.5* 10.2* 10.5*   HCT 33.2* 32.7* 32.8*    264 265       Significant Imaging: I have reviewed all pertinent imaging results/findings within the past 24 hours.

## 2019-12-01 NOTE — ASSESSMENT & PLAN NOTE
Pt with pseudomonal UTI.    Dr caraballo discussed oral therapy with daughter but she is adamant that he not get cipro  Continue IV abx for now  Potential DC tomorrow on oral therapy if family amenable. 2 weeks recommended by ID.

## 2019-12-01 NOTE — NURSING
Patient has been in the bed most of the day. Patient has been up one time to the bathroom but has relied on a brief for the rest of the day after being given Lasix. Patient has had few complaints. Vital signs are stable. Reminded to use call light before trying to get up.  IV is patent and saline locked.

## 2019-12-02 LAB
ALBUMIN SERPL BCP-MCNC: 2.4 G/DL (ref 3.5–5.2)
ALP SERPL-CCNC: 69 U/L (ref 55–135)
ALT SERPL W/O P-5'-P-CCNC: 5 U/L (ref 10–44)
ANION GAP SERPL CALC-SCNC: 6 MMOL/L (ref 8–16)
AST SERPL-CCNC: 15 U/L (ref 10–40)
BASOPHILS # BLD AUTO: 0.02 K/UL (ref 0–0.2)
BASOPHILS NFR BLD: 0.6 % (ref 0–1.9)
BILIRUB SERPL-MCNC: 0.3 MG/DL (ref 0.1–1)
BUN SERPL-MCNC: 23 MG/DL (ref 8–23)
CALCIUM SERPL-MCNC: 8.4 MG/DL (ref 8.7–10.5)
CHLORIDE SERPL-SCNC: 104 MMOL/L (ref 95–110)
CO2 SERPL-SCNC: 30 MMOL/L (ref 23–29)
CREAT SERPL-MCNC: 1.3 MG/DL (ref 0.5–1.4)
DIFFERENTIAL METHOD: ABNORMAL
EOSINOPHIL # BLD AUTO: 0.1 K/UL (ref 0–0.5)
EOSINOPHIL NFR BLD: 1.7 % (ref 0–8)
ERYTHROCYTE [DISTWIDTH] IN BLOOD BY AUTOMATED COUNT: 13.6 % (ref 11.5–14.5)
EST. GFR  (AFRICAN AMERICAN): >60 ML/MIN/1.73 M^2
EST. GFR  (NON AFRICAN AMERICAN): 52 ML/MIN/1.73 M^2
GLUCOSE SERPL-MCNC: 91 MG/DL (ref 70–110)
HCT VFR BLD AUTO: 31.1 % (ref 40–54)
HGB BLD-MCNC: 9.8 G/DL (ref 14–18)
IMM GRANULOCYTES # BLD AUTO: 0.04 K/UL (ref 0–0.04)
LYMPHOCYTES # BLD AUTO: 1.1 K/UL (ref 1–4.8)
LYMPHOCYTES NFR BLD: 30.4 % (ref 18–48)
MAGNESIUM SERPL-MCNC: 1.8 MG/DL (ref 1.6–2.6)
MCH RBC QN AUTO: 30.9 PG (ref 27–31)
MCHC RBC AUTO-ENTMCNC: 31.5 G/DL (ref 32–36)
MCV RBC AUTO: 98 FL (ref 82–98)
MONOCYTES # BLD AUTO: 0.3 K/UL (ref 0.3–1)
MONOCYTES NFR BLD: 9.4 % (ref 4–15)
NEUTROPHILS # BLD AUTO: 2.1 K/UL (ref 1.8–7.7)
NEUTROPHILS NFR BLD: 56.8 % (ref 38–73)
NRBC BLD-RTO: 0 /100 WBC
PLATELET # BLD AUTO: 242 K/UL (ref 150–350)
PMV BLD AUTO: 10.2 FL (ref 9.2–12.9)
POTASSIUM SERPL-SCNC: 4.3 MMOL/L (ref 3.5–5.1)
PROT SERPL-MCNC: 5.2 G/DL (ref 6–8.4)
RBC # BLD AUTO: 3.17 M/UL (ref 4.6–6.2)
SODIUM SERPL-SCNC: 140 MMOL/L (ref 136–145)
WBC # BLD AUTO: 3.62 K/UL (ref 3.9–12.7)

## 2019-12-02 PROCEDURE — 83735 ASSAY OF MAGNESIUM: CPT

## 2019-12-02 PROCEDURE — 97116 GAIT TRAINING THERAPY: CPT

## 2019-12-02 PROCEDURE — 36415 COLL VENOUS BLD VENIPUNCTURE: CPT

## 2019-12-02 PROCEDURE — 25000003 PHARM REV CODE 250: Performed by: NURSE PRACTITIONER

## 2019-12-02 PROCEDURE — 63600175 PHARM REV CODE 636 W HCPCS: Performed by: NURSE PRACTITIONER

## 2019-12-02 PROCEDURE — 25000003 PHARM REV CODE 250: Performed by: INTERNAL MEDICINE

## 2019-12-02 PROCEDURE — 97110 THERAPEUTIC EXERCISES: CPT

## 2019-12-02 PROCEDURE — 11000001 HC ACUTE MED/SURG PRIVATE ROOM

## 2019-12-02 PROCEDURE — 80053 COMPREHEN METABOLIC PANEL: CPT

## 2019-12-02 PROCEDURE — 63600175 PHARM REV CODE 636 W HCPCS: Performed by: HOSPITALIST

## 2019-12-02 PROCEDURE — 85025 COMPLETE CBC W/AUTO DIFF WBC: CPT

## 2019-12-02 RX ORDER — LOSARTAN POTASSIUM 25 MG/1
50 TABLET ORAL DAILY
Status: DISCONTINUED | OUTPATIENT
Start: 2019-12-03 | End: 2019-12-04 | Stop reason: HOSPADM

## 2019-12-02 RX ADMIN — SENNOSIDES AND DOCUSATE SODIUM 1 TABLET: 8.6; 5 TABLET ORAL at 08:12

## 2019-12-02 RX ADMIN — ENOXAPARIN SODIUM 40 MG: 100 INJECTION SUBCUTANEOUS at 05:12

## 2019-12-02 RX ADMIN — CARBIDOPA AND LEVODOPA 2 TABLET: 25; 100 TABLET ORAL at 09:12

## 2019-12-02 RX ADMIN — CARBIDOPA AND LEVODOPA 2 TABLET: 25; 100 TABLET ORAL at 05:12

## 2019-12-02 RX ADMIN — OXYCODONE AND ACETAMINOPHEN 1 TABLET: 5; 325 TABLET ORAL at 06:12

## 2019-12-02 RX ADMIN — MEROPENEM AND SODIUM CHLORIDE 1 G: 1 INJECTION, SOLUTION INTRAVENOUS at 03:12

## 2019-12-02 RX ADMIN — CARBIDOPA AND LEVODOPA 2 TABLET: 25; 100 TABLET ORAL at 02:12

## 2019-12-02 RX ADMIN — CARBIDOPA AND LEVODOPA 2 TABLET: 25; 100 TABLET ORAL at 03:12

## 2019-12-02 RX ADMIN — CLONAZEPAM 1.5 MG: 0.5 TABLET ORAL at 08:12

## 2019-12-02 RX ADMIN — TAMSULOSIN HYDROCHLORIDE 0.4 MG: 0.4 CAPSULE ORAL at 08:12

## 2019-12-02 RX ADMIN — CARBIDOPA AND LEVODOPA 2 TABLET: 25; 100 TABLET ORAL at 06:12

## 2019-12-02 NOTE — PT/OT/SLP PROGRESS
Occupational Therapy      Patient Name:  Toni Kamara   MRN:  [id number]    Patient not seen today secondary to Unavailable (Comment)(On first attempt, pt was getting a shower and on second attempt pt was soiled and needed to be changed. PCT notified and present in pt's room to assist him.). Will follow-up 12/3/19.    CONSTANTINE Simon  12/2/2019

## 2019-12-02 NOTE — PT/OT/SLP PROGRESS
Physical Therapy Treatment    Patient Name:  Toni Kamara   MRN:  [id number]    Recommendations:     Discharge Recommendations:  home health OT, home health PT, home with home health   Discharge Equipment Recommendations: wheelchair(provider mentioned pt sometimes freezes out and would be best to have a W/C)   Barriers to discharge: None    Assessment:     Toni Kamara is a 79 y.o. male admitted with a medical diagnosis of Complicated UTI (urinary tract infection).  He presents with the following impairments/functional limitations:  weakness, impaired self care skills, impaired functional mobilty, gait instability, impaired balance, decreased lower extremity function, decreased safety awareness . Tolerated treatment well. Requires assistance for safety with activity due to weakness and shakiness with activity. Has caregiver at bedside. Pleasant and eager to mobilize.    Rehab Prognosis: Good; patient would benefit from acute skilled PT services to address these deficits and reach maximum level of function.    Recent Surgery: * No surgery found *      Plan:     During this hospitalization, patient to be seen 6 x/week to address the identified rehab impairments via gait training, therapeutic activities, therapeutic exercises and progress toward the following goals:    · Plan of Care Expires:  12/21/19    Subjective     Chief Complaint: none stated  Patient/Family Comments/goals: to return home  Pain/Comfort:  · Pain Rating 1: 0/10      Objective:     Communicated with nurse Velasquez prior to session.  Patient found supine with (sitter at bedside) upon PT entry to room.     General Precautions: Standard, fall   Orthopedic Precautions:N/A   Braces: N/A     Functional Mobility:  · Bed Mobility:     · Rolling Right: contact guard assistance  · Supine to Sit: contact guard assistance  · Transfers:     · Sit to Stand:  contact guard assistance with rolling walker  · Gait: 250' with rw and Min A with verbal cues for safe  technique.      AM-PAC 6 CLICK MOBILITY          Therapeutic Activities and Exercises:   Transferred to sitting EOB with shoes already on. Donned extra gown with assistance. Ambulated in hallway.   Sat up in chair at bedside. Performed BLE exercises at 10 reps each; TKE's, Hip abd/ add/ flexion, AP's, marches.    Patient left up in chair with all lines intact, call button in reach, chair alarm on, nurse Eva notified and caregiver present..    GOALS:   Multidisciplinary Problems     Physical Therapy Goals        Problem: Physical Therapy Goal    Goal Priority Disciplines Outcome Goal Variances Interventions   Physical Therapy Goal     PT, PT/OT Ongoing, Progressing     Description:  Goals to be met by: 19     Patient will increase functional independence with mobility by performin. Supine to sit with Modified [place]  2. Sit to stand transfer with Stand-by Assistance  3. Bed to chair transfer with Stand-by Assistance using Rolling Walker  4. Gait  x 300 feet with Stand-by Assistance using Rolling Walker.   5. Lower extremity exercise program x20 reps per handout, with supervision                      Time Tracking:     PT Received On: 19  PT Start Time: 1025     PT Stop Time: 1045  PT Total Time (min): 20 min     Billable Minutes: Gait Training 10min and Therapeutic Exercise 10min    Treatment Type: Treatment  PT/PTA: PTA     PTA Visit Number: 1     Gloria Whalen PTA  2019

## 2019-12-02 NOTE — PLAN OF CARE
Problem: Physical Therapy Goal  Goal: Physical Therapy Goal  Description  Goals to be met by: 19     Patient will increase functional independence with mobility by performin. Supine to sit with Modified [place]  2. Sit to stand transfer with Stand-by Assistance  3. Bed to chair transfer with Stand-by Assistance using Rolling Walker  4. Gait  x 300 feet with Stand-by Assistance using Rolling Walker.   5. Lower extremity exercise program x20 reps per handout, with supervision     Outcome: Ongoing, Progressing   Therapeutic activity : Bed mobility, transfers, gait with rw and assistance for safety, BLE exercises.

## 2019-12-02 NOTE — NURSING
Patient has spent the day in bed. He is eating well, around 100% of his lunch tray and 50% of his dinner tray. He has been frequently changed and moisturizing cream has been applied to dry spots where needed. Vital signs are stable. Patient has no complaints at this time.

## 2019-12-02 NOTE — PLAN OF CARE
POC reviewed with patient. Patient verbalizes understanding. Safety maintained throughout shift. No tele. Sitter at bedside. No complaints of pain noted. Call light within reach.

## 2019-12-02 NOTE — PLAN OF CARE
POC reviewed and updated,maintain strict I&Os,elevate lower extremities,weigh daily,neuro checks,monitor BP and lab results,safety maintained,bed low with wheels locked,call light in reach,will continue to monitor

## 2019-12-03 PROBLEM — N41.0 PROSTATITIS, ACUTE: Status: ACTIVE | Noted: 2019-12-03

## 2019-12-03 PROCEDURE — 63600175 PHARM REV CODE 636 W HCPCS: Performed by: NURSE PRACTITIONER

## 2019-12-03 PROCEDURE — 92610 EVALUATE SWALLOWING FUNCTION: CPT

## 2019-12-03 PROCEDURE — 25000003 PHARM REV CODE 250: Performed by: INTERNAL MEDICINE

## 2019-12-03 PROCEDURE — 36410 VNPNXR 3YR/> PHY/QHP DX/THER: CPT

## 2019-12-03 PROCEDURE — 76937 US GUIDE VASCULAR ACCESS: CPT

## 2019-12-03 PROCEDURE — 63600175 PHARM REV CODE 636 W HCPCS: Performed by: HOSPITALIST

## 2019-12-03 PROCEDURE — C1751 CATH, INF, PER/CENT/MIDLINE: HCPCS

## 2019-12-03 PROCEDURE — 97116 GAIT TRAINING THERAPY: CPT

## 2019-12-03 PROCEDURE — 97535 SELF CARE MNGMENT TRAINING: CPT

## 2019-12-03 PROCEDURE — 25000003 PHARM REV CODE 250: Performed by: NURSE PRACTITIONER

## 2019-12-03 PROCEDURE — 97530 THERAPEUTIC ACTIVITIES: CPT

## 2019-12-03 PROCEDURE — 25000003 PHARM REV CODE 250: Performed by: HOSPITALIST

## 2019-12-03 PROCEDURE — 97110 THERAPEUTIC EXERCISES: CPT

## 2019-12-03 PROCEDURE — 11000001 HC ACUTE MED/SURG PRIVATE ROOM

## 2019-12-03 RX ORDER — TAMSULOSIN HYDROCHLORIDE 0.4 MG/1
0.4 CAPSULE ORAL DAILY
Qty: 30 CAPSULE | Refills: 2 | Status: SHIPPED | OUTPATIENT
Start: 2019-12-04 | End: 2020-03-03

## 2019-12-03 RX ORDER — MEROPENEM AND SODIUM CHLORIDE 1 G/50ML
1 INJECTION, SOLUTION INTRAVENOUS EVERY 12 HOURS
Qty: 1400 ML | Refills: 0
Start: 2019-12-03 | End: 2019-12-17

## 2019-12-03 RX ADMIN — TAMSULOSIN HYDROCHLORIDE 0.4 MG: 0.4 CAPSULE ORAL at 08:12

## 2019-12-03 RX ADMIN — CARBIDOPA AND LEVODOPA 2 TABLET: 25; 100 TABLET ORAL at 03:12

## 2019-12-03 RX ADMIN — SENNOSIDES AND DOCUSATE SODIUM 1 TABLET: 8.6; 5 TABLET ORAL at 09:12

## 2019-12-03 RX ADMIN — OXYCODONE AND ACETAMINOPHEN 1 TABLET: 5; 325 TABLET ORAL at 12:12

## 2019-12-03 RX ADMIN — SENNOSIDES AND DOCUSATE SODIUM 1 TABLET: 8.6; 5 TABLET ORAL at 08:12

## 2019-12-03 RX ADMIN — LOSARTAN POTASSIUM 50 MG: 25 TABLET ORAL at 08:12

## 2019-12-03 RX ADMIN — CLONAZEPAM 1.5 MG: 0.5 TABLET ORAL at 09:12

## 2019-12-03 RX ADMIN — MEROPENEM AND SODIUM CHLORIDE 1 G: 1 INJECTION, SOLUTION INTRAVENOUS at 03:12

## 2019-12-03 RX ADMIN — ENOXAPARIN SODIUM 40 MG: 100 INJECTION SUBCUTANEOUS at 06:12

## 2019-12-03 RX ADMIN — CARBIDOPA AND LEVODOPA 2 TABLET: 25; 100 TABLET ORAL at 09:12

## 2019-12-03 RX ADMIN — CARBIDOPA AND LEVODOPA 2 TABLET: 25; 100 TABLET ORAL at 08:12

## 2019-12-03 RX ADMIN — CARBIDOPA AND LEVODOPA 2 TABLET: 25; 100 TABLET ORAL at 06:12

## 2019-12-03 RX ADMIN — CARBIDOPA AND LEVODOPA 2 TABLET: 25; 100 TABLET ORAL at 05:12

## 2019-12-03 NOTE — PROGRESS NOTES
[facility] Medicine  Progress Note    Patient Name: Toni Kamara  MRN: [id number]  Patient Class: IP- Inpatient   Admission Date: 11/29/2019  Length of Stay: 3 days  Attending Physician: Efrain Manuel MD  Primary Care Provider: Harinder Stout MD        Subjective:     Principal Problem:Complicated UTI (urinary tract infection)        HPI:  Toni Kamara is a 79-year-old male with PMHx significant for Parkinson's disease, BPH, HTN, arthritis, and anxiety.  He presented in the ED the recommendation of his PCP further evaluation of leg swelling and fatigue.  Pt was recently D/C on 11/26 after inpatient treatment for pseudomonal UTI.  Pt began oral Cipro on the morning of 11/26 after culture and sensitivity results.  Pt was D/C on oral Cipro and has been compliant with medications since that time.  His daughter is concerned over increasing fatigue and weakness as well as bilateral lower extremity edema. Pt denies any Hx of edema. He also endorses some cracking and popping to his left knee as well as left knee pain. The pain is not any sensation, however the popping and cracking is.  His daughter became concerned over possibility of adverse reaction to Cipro.  His PCP was phoned, symptoms were reported, and he was recommended to follow up in ED for further evaluation and IV antibiotic therapy for treatment of his pseudomonal UTI given limited sensitivities.  He was D/C on a 5 day course and has completed 3 of 5 days.  Pt reports ongoing dysuria since discharge, though did urinate in ED without symptoms.  He denies any chest pain, SOB, cough, nausea, vomiting, abdominal pain, HA, or falls.  He denies any Hx of blood clots and denies any Hx of lower extremity edema. She is admitted to service of hospital Medicine for additional evaluation and management.  Other pertinent medical Hx as below:    Overview/Hospital Course:  No notes on file    Interval History:  No new complaints.  No  fevers.    Review of Systems   Constitutional: Negative for fatigue and fever.   Respiratory: Negative for cough and shortness of breath.    Cardiovascular: Negative for chest pain.   Gastrointestinal: Negative for abdominal pain.     Objective:     Vital Signs (Most Recent):  Temp: 97.7 °F (36.5 °C) (12/02/19 1930)  Pulse: 91 (12/02/19 1930)  Resp: 17 (12/02/19 1930)  BP: 105/67 (12/02/19 1930)  SpO2: 95 % (12/02/19 1930) Vital Signs (24h Range):  Temp:  [97 °F (36.1 °C)-97.7 °F (36.5 °C)] 97.7 °F (36.5 °C)  Pulse:  [70-91] 91  Resp:  [14-18] 17  SpO2:  [95 %-97 %] 95 %  BP: (101-135)/(51-67) 105/67     Weight: 51.7 kg (114 lb)  Body mass index is 17.85 kg/m².    Intake/Output Summary (Last 24 hours) at 12/2/2019 2233  Last data filed at 12/2/2019 1200  Gross per 24 hour   Intake 660 ml   Output --   Net 660 ml      Physical Exam   Constitutional: He is oriented to person, place, and time. He appears well-developed and well-nourished. No distress.   HENT:   Head: Normocephalic and atraumatic.   Eyes: Pupils are equal, round, and reactive to light.   Neck: Neck supple. No thyromegaly present.   Cardiovascular: Normal rate and regular rhythm. Exam reveals no gallop and no friction rub.   No murmur heard.  Pulmonary/Chest: Effort normal and breath sounds normal. No respiratory distress. He has no wheezes.   Abdominal: Soft. Bowel sounds are normal. He exhibits no distension. There is no tenderness. There is no guarding.   Musculoskeletal: Normal range of motion. He exhibits edema (in lower extremities).   Neurological: He is alert and oriented to person, place, and time.   Skin: Skin is warm and dry. No erythema.   Psychiatric: He has a normal mood and affect.       Significant Labs: All pertinent labs within the past 24 hours have been reviewed.    Significant Imaging: I have reviewed all pertinent imaging results/findings within the past 24 hours.      Assessment/Plan:      * Complicated UTI (urinary tract  infection)  Pt with pseudomonal UTI.    Dr caraballo discussed oral therapy with daughter but dtr is adamant that Mr. Kamara not get cipro  Continue IV abx for now.  He will probably need home IV infusions of antibiotic, which pt's primary care provider will need to follow.     Lower extremity edema  Uncertain etiology, seems unlikely this is related to Cipro.   Resolved after a dose of Lasix.  Vein US neg for thrombus.    Anxiety disorder   Chronic, stable.  Continue clonazepam as per outpatient regimen.  Monitor clinically      Essential hypertension  Chronic, controlled.  Latest blood pressure and vitals reviewed-   Temp:  [97 °F (36.1 °C)-97.7 °F (36.5 °C)]   Pulse:  [70-91]   Resp:  [14-18]   BP: (101-135)/(51-67)   SpO2:  [95 %-97 %] .   Home meds for hypertension were reviewed and noted below. Will resume his losartan..  outpt Hypertension Medications             losartan (COZAAR) 50 MG tablet Take 1 tablet (50 mg total) by mouth once daily.        Will utilize p.r.n. blood pressure medication only if patient's blood pressure greater than  180/110 and he develops symptoms such as worsening chest pain or shortness of breath.        Parkinson's disease dementia  Chronic, stable.  Continue Sinemet per outpatient regimen.  Pain fall precautions.  Utilize nonpharmacologic modalities to prevent delirium for which he is high risk.      BPH (benign prostatic hyperplasia)  Added flomax.  Yesterday, PVR's were 300 at one point and 500 at another.        VTE Risk Mitigation (From admission, onward)         Ordered     enoxaparin injection 40 mg  Daily      11/29/19 2207     IP VTE HIGH RISK PATIENT  Once      11/29/19 2207                      Efrain Manuel MD  Department of Hospital Medicine   [facility]

## 2019-12-03 NOTE — NURSING
18 Gx 10cm PowerGlide Midline placed to pts left cephalic vein with the use of ultrasound guidance.    Ultrasound guidance: yes  Vessel Caliber: large and patent, compressibility normal  Needle advanced into vessel with real time Ultrasound guidance.  Guidewire confirmed in vessel.  Image recorded and saved.  Sterile sheath used.  Sterile dressing applied  Arm circumference- 22  Dressing dated   Education provided to patient re: proper maintenance of line- pt verbalized understanding

## 2019-12-03 NOTE — ASSESSMENT & PLAN NOTE
Chronic, controlled.  Latest blood pressure and vitals reviewed-   Temp:  [97 °F (36.1 °C)-97.7 °F (36.5 °C)]   Pulse:  [70-91]   Resp:  [14-18]   BP: (101-135)/(51-67)   SpO2:  [95 %-97 %] .   Home meds for hypertension were reviewed and noted below. Will resume his losartan..  outpt Hypertension Medications             losartan (COZAAR) 50 MG tablet Take 1 tablet (50 mg total) by mouth once daily.        Will utilize p.r.n. blood pressure medication only if patient's blood pressure greater than  180/110 and he develops symptoms such as worsening chest pain or shortness of breath.

## 2019-12-03 NOTE — NURSING
Patient complaining of still having to urinate after just using the urinal. Bladder scan performed which showed a PVR of 494 mL. Dr. Manuel notified.

## 2019-12-03 NOTE — ASSESSMENT & PLAN NOTE
Chronic, stable.  Continue Sinemet per outpatient regimen.  Pain fall precautions.  Utilize nonpharmacologic modalities to prevent delirium for which he is high risk.

## 2019-12-03 NOTE — ASSESSMENT & PLAN NOTE
Pt with pseudomonal UTI.    Dr caraballo discussed oral therapy with daughter but dtr is adamant that Mr. Kamara not get cipro  Continue IV abx for now.  He will probably need home IV infusions of antibiotic, which pt's primary care provider will need to follow.

## 2019-12-03 NOTE — ASSESSMENT & PLAN NOTE
Uncertain etiology, seems unlikely this is related to Cipro.   Resolved after a dose of Lasix.  Vein US neg for thrombus.

## 2019-12-03 NOTE — SUBJECTIVE & OBJECTIVE
Interval History:  No new complaints.  No fevers.    Review of Systems   Constitutional: Negative for fatigue and fever.   Respiratory: Negative for cough and shortness of breath.    Cardiovascular: Negative for chest pain.   Gastrointestinal: Negative for abdominal pain.     Objective:     Vital Signs (Most Recent):  Temp: 97.7 °F (36.5 °C) (12/02/19 1930)  Pulse: 91 (12/02/19 1930)  Resp: 17 (12/02/19 1930)  BP: 105/67 (12/02/19 1930)  SpO2: 95 % (12/02/19 1930) Vital Signs (24h Range):  Temp:  [97 °F (36.1 °C)-97.7 °F (36.5 °C)] 97.7 °F (36.5 °C)  Pulse:  [70-91] 91  Resp:  [14-18] 17  SpO2:  [95 %-97 %] 95 %  BP: (101-135)/(51-67) 105/67     Weight: 51.7 kg (114 lb)  Body mass index is 17.85 kg/m².    Intake/Output Summary (Last 24 hours) at 12/2/2019 2233  Last data filed at 12/2/2019 1200  Gross per 24 hour   Intake 660 ml   Output --   Net 660 ml      Physical Exam   Constitutional: He is oriented to person, place, and time. He appears well-developed and well-nourished. No distress.   HENT:   Head: Normocephalic and atraumatic.   Eyes: Pupils are equal, round, and reactive to light.   Neck: Neck supple. No thyromegaly present.   Cardiovascular: Normal rate and regular rhythm. Exam reveals no gallop and no friction rub.   No murmur heard.  Pulmonary/Chest: Effort normal and breath sounds normal. No respiratory distress. He has no wheezes.   Abdominal: Soft. Bowel sounds are normal. He exhibits no distension. There is no tenderness. There is no guarding.   Musculoskeletal: Normal range of motion. He exhibits edema (in lower extremities).   Neurological: He is alert and oriented to person, place, and time.   Skin: Skin is warm and dry. No erythema.   Psychiatric: He has a normal mood and affect.       Significant Labs: All pertinent labs within the past 24 hours have been reviewed.    Significant Imaging: I have reviewed all pertinent imaging results/findings within the past 24 hours.

## 2019-12-03 NOTE — PT/OT/SLP PROGRESS
"Physical Therapy Treatment    Patient Name:  Toni Kamara   MRN:  [id number]    Recommendations:     Discharge Recommendations:  home health OT, home health PT, home with home health   Discharge Equipment Recommendations: wheelchair(provider mentioned pt sometimes freezes out and would be best to have a W/C)   Barriers to discharge: None    Assessment:     Toni Kamara is a 79 y.o. male admitted with a medical diagnosis of Complicated UTI (urinary tract infection).  He presents with the following impairments/functional limitations:  weakness, impaired self care skills, impaired functional mobilty, gait instability, decreased lower extremity function, decreased safety awareness . Tolerated treatment. Patient agitated and expressing desire for thin liquid apple juice and to be discharged home. Reports , " I will throw everything on the floor to go home".  Required assistance for safety and verbal cues to decrease pace. Declined sitting upon return to room, requiring increased encouragement to do so. Nurse informed.    Rehab Prognosis: Good; patient would benefit from acute skilled PT services to address these deficits and reach maximum level of function.    Recent Surgery: * No surgery found *      Plan:     During this hospitalization, patient to be seen 6 x/week to address the identified rehab impairments via gait training, therapeutic activities, therapeutic exercises and progress toward the following goals:    · Plan of Care Expires:  12/21/19    Subjective     Chief Complaint: wants to go home as well as refuses thickened liquids.  Patient/Family Comments/goals: to return home.  Pain/Comfort:  · Pain Rating 1: 0/10      Objective:     Communicated with nurse Carroll prior to session.  Patient found seated EOB ,barefoot with (sitter at bedside.) upon PT entry to room.     General Precautions: Standard, fall   Orthopedic Precautions:N/A   Braces: N/A     Functional Mobility:  · Transfers:     · Sit to Stand:  " contact guard assistance with rolling walker  · Gait: 300' x 2 with rw and Min A.      AM-PAC 6 CLICK MOBILITY          Therapeutic Activities and Exercises:   Shoes, hat and gown donned and  seated EOB.   Ambulated in hallway refusing initially to return to room.   Would not sit upon return ,adamantly requesting thin juice. Sat EOB with much encouragement.   Performed BLE exercises at 2 sets 10 reps each ; TKE's Hip abd/add/ marches.           Patient left seated EOB with all lines intact, call button in reach, nurse Eva notified and caregiver present..    GOALS:   Multidisciplinary Problems     Physical Therapy Goals        Problem: Physical Therapy Goal    Goal Priority Disciplines Outcome Goal Variances Interventions   Physical Therapy Goal     PT, PT/OT Ongoing, Progressing     Description:  Goals to be met by: 19     Patient will increase functional independence with mobility by performin. Supine to sit with Modified [place]  2. Sit to stand transfer with Stand-by Assistance  3. Bed to chair transfer with Stand-by Assistance using Rolling Walker  4. Gait  x 300 feet with Stand-by Assistance using Rolling Walker.   5. Lower extremity exercise program x20 reps per handout, with supervision                      Time Tracking:     PT Received On: 19  PT Start Time: 0900     PT Stop Time: 09  PT Total Time (min): 25 min     Billable Minutes: Gait Training 9min, Therapeutic Activity 8min and Therapeutic Exercise 8min    Treatment Type: Treatment  PT/PTA: PTA     PTA Visit Number: 2     Gloria Whalen, PTA  2019

## 2019-12-03 NOTE — PLAN OF CARE
Per Eliza with PHN, pt is set up with [facility] for IV infusion. CM sent referral via [facility] to [facility] for review.        12/03/19 1404   Post-Acute Status   Post-Acute Authorization Other  (HOME INFUSION)   Other Status   (AUTH OBTAINED)

## 2019-12-03 NOTE — PLAN OF CARE
I sent the pts HH orders and HH packet to N to assign a HH provider. CM following. Denae Bassett, ESC[facility]     12/03/19 6668   Post-Acute Status   Post-Acute Authorization Home Health/Hospice   Home Health/Hospice Status Referrals Sent

## 2019-12-03 NOTE — PLAN OF CARE
POC reviewed with patient. Patient verbalizes understanding. Safety maintained throughout shift. No tele. Sitter at bedside. No complaints of pain. Call light within reach.

## 2019-12-03 NOTE — PLAN OF CARE
Pt already has an appt scheduled with PCP. AVS updated     12/03/19 1585   Discharge Assessment   Assessment Type Discharge Planning Reassessment

## 2019-12-03 NOTE — PLAN OF CARE
Problem: Physical Therapy Goal  Goal: Physical Therapy Goal  Description  Goals to be met by: 19     Patient will increase functional independence with mobility by performin. Supine to sit with Modified [place]  2. Sit to stand transfer with Stand-by Assistance  3. Bed to chair transfer with Stand-by Assistance using Rolling Walker  4. Gait  x 300 feet with Stand-by Assistance using Rolling Walker.   5. Lower extremity exercise program x20 reps per handout, with supervision     Outcome: Ongoing, Progressing   Therapeutic activity to improve mobility and function ; transfers, gait, LE exercises.

## 2019-12-03 NOTE — PT/OT/SLP EVAL
"Speech Language Pathology Evaluation  Bedside Swallow    Patient Name:  Toni Kamara   MRN:  [id number]  Admitting Diagnosis: Complicated UTI (urinary tract infection)    Recommendations:                 General Recommendations:  Consider MBSS in pt with Parkinsons; none in Epic  Diet recommendations:  Dental Soft(IDDSI 6), Thin   Aspiration Precautions: Standard aspiration precautions   General Precautions: Standard, aspiration, fall  Communication strategies:  provide increased time to answer    History:     Past Medical History:   Diagnosis Date    Anxiety 1/25/2012    Arthritis of knee 1/25/2012    Back pain 1/25/2012    BPH (benign prostatic hyperplasia) 1/25/2012    Essential tremor 3/19/2014    Kyphoscoliosis 1/25/2012    Parkinson's disease        Past Surgical History:   Procedure Laterality Date    APPENDECTOMY      FRACTURE SURGERY Left Dec 18, 2014    HEMORRHOID SURGERY      PROSTATE SURGERY  2008    TURP and had renal insu from obsr    TONSILLECTOMY         Social History: Patient lives alone with 24/7 caregivers.    Prior Intubation HX:  None    Modified Barium Swallow: None    Imaging:  US Lower Extremity Veins Bilateral   Final Result      No evidence of deep venous thrombosis in either lower extremity.         Electronically signed by: Chandu Bernstein MD   Date:    11/30/2019   Time:    09:30      X-Ray Chest 1 View   Final Result      Borderline cardiomegaly with new trace right-sided pleural effusion.  No evidence of overt pulmonary edema.         Electronically signed by: Vikash August MD   Date:    11/29/2019   Time:    23:05           Prior diet: Regular/thin.    Subjective   "I'm not drinking that thick liquid!"    Pain/Comfort:  · Pain Rating Post-Intervention 2: 0/10    Objective:   Pt seen for clinical swallow evaluation. He is AAOx4 sitting up in chair with caregiver at bedside. He is currently on a dental soft diet with nectar thick liquids. It is unknown why he was placed on " thickened liquids as he has not been evaluated by speech therapy and was not on thickened liquids prior to hospitalization. He denies dysphagia or h/o PNA. +h/o Parkinson; diagnosed ~2 years ago per his report.    Oral Musculature Evaluation  · Oral Musculature: general weakness  · Dentition: present and adequate(few missing. Recently had 4 teeth extracted)  · Secretion Management: adequate  · Mucosal Quality: adequate  · Mandibular Strength and Mobility: WFL  · Oral Labial Strength and Mobility: WFL  · Lingual Strength and Mobility: WFL  · Volitional Cough: weak  · Volitional Swallow: able to palpate laryngeal rise  · Voice Prior to PO Intake: mildly wet; dysarthria    Bedside Swallow Eval:   Consistencies Assessed:  · Thin liquids --via tsp, cup and straw  · Puree --applesauce  · Mixed consistencies --diced peaches  · Solids --cookie     Oral Phase:   · WFL    Pharyngeal Phase:   · no overt clinical signs/symptoms of aspiration    Compensatory Strategies  · None    Treatment: Pt/family educated re: results/recs of evaluation, obtaining baseline MBSS given his Parkinson's diagnosis, SLP role and POC. Receptive to information provided.     Assessment:   Pt currently on nectar thick liquids for unknown reasons with refusal to consume. Clinical swallow evaluation completed. Pt consumed all PO trials with no overt s/s penetration/aspiration. REC dental soft textures (IDDSI 6) with thin liquids. Consider MBSS in pt with Parkinson; this can also be done outpatient.        Toni Kamara is a 79 y.o. male with an SLP diagnosis of Dysarthria and risk for aspiration 2' Parkinson's. Clinical swallow evaluation completed. He consumed all PO trials with no overt s/s penetration/aspiration. REC dental soft textures (IDDSI 6) with thin liquids. Consider obtaining baseline MBSS in pt with Parkinson; this can also be done outpatient.       Goals:   Multidisciplinary Problems     SLP Goals     Not on file          Multidisciplinary  Problems (Resolved)        Problem: SLP Goal    Goal Priority Disciplines Outcome   SLP Goal   (Resolved)     SLP Met                   Plan:     · Patient to be seen:      · Plan of Care expires:     · Plan of Care reviewed with:  patient, caregiver   · SLP Follow-Up:  Yes       Discharge recommendations:      Barriers to Discharge:  None    Time Tracking:     SLP Treatment Date:   12/03/19  Speech Start Time:  1250  Speech Stop Time:  1302     Speech Total Time (min):  12 min    Billable Minutes: Eval Swallow and Oral Function 12 and Total Time 12    Sandrine Dodson CCC-SLP  12/03/2019

## 2019-12-03 NOTE — PLAN OF CARE
Per Eliza (PHN) , she is working on getting auth to [facility] for resumption of home health services. CM sent referral to [facility] via [facility].        12/03/19 7735   Post-Acute Status   Post-Acute Authorization Home Health/Hospice   Home Health/Hospice Status Pending Payor Review

## 2019-12-03 NOTE — PT/OT/SLP PROGRESS
Occupational Therapy   Treatment    Name: Toni Kamara  MRN: [id number]  Admitting Diagnosis:  Complicated UTI (urinary tract infection)       Recommendations:     Discharge Recommendations: home health PT, home health OT, home health speech therapy, home with home health  Discharge Equipment Recommendations:  none  Barriers to discharge:       Assessment:     Toni Kamara is a 79 y.o. male with a medical diagnosis of Complicated UTI (urinary tract infection).  He presents with tremors 2* Parkinson's, defined UE muscular structure, determination to get stronger to be able to go home, SBA and extra time for ADLs . Performance deficits affecting function are weakness, impaired endurance, impaired coordination, decreased upper extremity function, decreased lower extremity function, impaired self care skills.     Rehab Prognosis:  Good; patient would benefit from acute skilled OT services to address these deficits and reach maximum level of function.       Plan:     Patient to be seen 3 x/week to address the above listed problems via self-care/home management, therapeutic activities, therapeutic exercises  · Plan of Care Expires: 12/07/19  · Plan of Care Reviewed with: patient    Subjective     Pain/Comfort:  · Pain Rating 1: 0/10  · Pain Rating Post-Intervention 1: 0/10    Objective:     Communicated with: Eva CEDENO prior to session.  Patient found sitting EOB trying to [facility] gowns with peripheral IV upon OT entry to room.    General Precautions: Standard, fall   Orthopedic Precautions:N/A   Braces:       Occupational Performance:     Functional Mobility/Transfers:  · Patient completed Sit <> Stand Transfer with contact guard assistance  with  no assistive device   Functional Mobility: FAIR-; pt is unable to right LOB in standing and when sitting EOB needs intermittent assist to maintain 2* fatigue and weakness, kyphosis.    Activities of Daily Living:  · Upper Body Dressing: minimum assistance for orientation  of shirt; pt threading UE under the dhirt instead of through it, also threading it backwards to start, assist to pull past elbows and down over shoulders and upper back.  · Lower Body Dressing: independence and minimum assistance for orientation of pants, having to rethread L LE due to threading it in wrong pant leg first, assist to stand for pt to pull up his pants.   · Don/doff shoes using figure 4 positioning at EOB with increased time 2* weakness and tremors and with Green.      [facility] 6 Click ADL:      Treatment & Education:  -pt is aware of affects of bed rest on the body and cooperative with therapy. Pt is less confused and distracted than previous treatment session documentation states, evidence that his health is improving.  -VIZCAINO ed role of OT, call button, when to call for assist, especially before getting up to use bathroom. Pt demo'd understanding of call button and v/u too.      Patient left sitting EOB  with call button in reach, bed alarm off and RNEva notifiedEducation:      GOALS:   Multidisciplinary Problems     Occupational Therapy Goals        Problem: Occupational Therapy Goal    Goal Priority Disciplines Outcome Interventions   Occupational Therapy Goal     OT, PT/OT Ongoing, Progressing    Description:  Goals to be met by: 12-7-19     Patient will increase functional independence with ADLs by performing:    Feeding with Minimal Assistance and Assistive Devices as needed.  Grooming while seated with Stand-by Assistance and Assistive Devices as needed.                      Time Tracking:     OT Date of Treatment: 12/03/19  OT Start Time: 0955  OT Stop Time: 1018  OT Total Time (min): 23 min    Billable Minutes:Self Care/Home Management 23 min    NEELA Moreno  12/3/2019    I certify that I completed a client care conference with the [facility] prior to treatment.  NATALY Neumann LOTR

## 2019-12-03 NOTE — PLAN OF CARE
CM sent request for auth to [facility] for home IV infusion. CM following.        12/03/19 1139   Post-Acute Status   Post-Acute Authorization Other  (HOME INFUSION)   Other Status   (REFFERAL SENT)

## 2019-12-03 NOTE — PLAN OF CARE
Problem: SLP Goal  Goal: SLP Goal  Outcome: Met    Clinical swallow evaluation completed. Pt consumed all PO trials with no overt s/s penetration/aspiration. REC dental soft textures (IDDSI 6) with thin liquids. Consider MBSS in pt with Parkinson; this can also be done outpatient.

## 2019-12-03 NOTE — PLAN OF CARE
Per Bernadine with [employer], she will be here around 4 to provide education for antibiotics to pt and his sitter.        12/03/19 1433   Post-Acute Status   Post-Acute Authorization Other  (HOME INFUSION)   Other Status See Comments  (SET UP COMPLETE)

## 2019-12-03 NOTE — PLAN OF CARE
Problem: Occupational Therapy Goal  Goal: Occupational Therapy Goal  Description  Goals to be met by: 12-7-19     Patient will increase functional independence with ADLs by performing:    Feeding with Minimal Assistance and Assistive Devices as needed.  Grooming while seated with Stand-by Assistance and Assistive Devices as needed.     Outcome: Ongoing, Progressing

## 2019-12-04 VITALS
HEART RATE: 72 BPM | BODY MASS INDEX: 15.78 KG/M2 | DIASTOLIC BLOOD PRESSURE: 68 MMHG | OXYGEN SATURATION: 95 % | SYSTOLIC BLOOD PRESSURE: 148 MMHG | TEMPERATURE: 98 F | RESPIRATION RATE: 18 BRPM | WEIGHT: 100.5 LBS | HEIGHT: 67 IN

## 2019-12-04 PROCEDURE — 25000003 PHARM REV CODE 250: Performed by: NURSE PRACTITIONER

## 2019-12-04 PROCEDURE — 97110 THERAPEUTIC EXERCISES: CPT

## 2019-12-04 PROCEDURE — 63600175 PHARM REV CODE 636 W HCPCS: Performed by: HOSPITALIST

## 2019-12-04 PROCEDURE — 97116 GAIT TRAINING THERAPY: CPT

## 2019-12-04 PROCEDURE — 25000003 PHARM REV CODE 250: Performed by: INTERNAL MEDICINE

## 2019-12-04 PROCEDURE — 25000003 PHARM REV CODE 250: Performed by: HOSPITALIST

## 2019-12-04 RX ADMIN — CARBIDOPA AND LEVODOPA 2 TABLET: 25; 100 TABLET ORAL at 09:12

## 2019-12-04 RX ADMIN — OXYCODONE AND ACETAMINOPHEN 1 TABLET: 5; 325 TABLET ORAL at 05:12

## 2019-12-04 RX ADMIN — CARBIDOPA AND LEVODOPA 2 TABLET: 25; 100 TABLET ORAL at 05:12

## 2019-12-04 RX ADMIN — SENNOSIDES AND DOCUSATE SODIUM 1 TABLET: 8.6; 5 TABLET ORAL at 09:12

## 2019-12-04 RX ADMIN — MEROPENEM AND SODIUM CHLORIDE 1 G: 1 INJECTION, SOLUTION INTRAVENOUS at 03:12

## 2019-12-04 RX ADMIN — LOSARTAN POTASSIUM 50 MG: 25 TABLET ORAL at 09:12

## 2019-12-04 RX ADMIN — TAMSULOSIN HYDROCHLORIDE 0.4 MG: 0.4 CAPSULE ORAL at 09:12

## 2019-12-04 NOTE — ASSESSMENT & PLAN NOTE
Uncertain etiology, seems unlikely this is related to Cipro.   Nonetheless, cipro was added to his allergy list.  Resolved after a dose of Lasix.  Vein US neg for thrombus.

## 2019-12-04 NOTE — NURSING
Discharge instructions reviewed with pt and pt's sitter, understanding verbalized. All questions answered. Educated on follow up appointments and medications, verbalized understanding. Pt transferred off unit via wheelchair. Sitter at side. NAD noted.

## 2019-12-04 NOTE — PHYSICIAN QUERY
PT Name: Toni Kamara  MR #: [id number]     Physician Query Form - Documentation Clarification      CDS/: Estella Lazo RN                 Contact information:[email or web address]    This form is a permanent document in the medical record.     Query Date: December 4, 2019    By submitting this query, we are merely seeking further clarification of documentation. Please utilize your independent clinical judgment when addressing the question(s) below.    The Medical record reflects the following:    Supporting Clinical Findings Location in Medical Record   Pt seen for clinical swallow evaluation. He is AAOx4 sitting up in chair with caregiver at bedside. He is currently on a dental soft diet with nectar thick liquids. It is unknown why he was placed on thickened liquids as he has not been evaluated by speech therapy and was not on thickened liquids prior to hospitalization. He denies dysphagia or h/o PNA. +h/o Parkinson; diagnosed ~2 years ago per his report.    Pt currently on nectar thick liquids for unknown reasons with refusal to consume. Clinical swallow evaluation completed. Pt consumed all PO trials with no overt s/s penetration/aspiration. REC dental soft textures (IDDSI 6) with thin liquids. Consider MBSS in pt with Parkinson; this can also be done outpatient.         Toni Kamara is a 79 y.o. male with an SLP diagnosis of Dysarthria and risk for aspiration 2' Parkinson's. Clinical swallow evaluation completed. He consumed all PO trials with no overt s/s penetration/aspiration. REC dental soft textures (IDDSI 6) with thin liquids. Consider obtaining baseline MBSS in pt with Parkinson; this can also be done outpatient.     Weight: 45.6 kg (100 lb 8.5 oz)  Body mass index is 15.75 kg/m².    Constitutional: He is oriented to person, place, and time. He appears well-developed and well-nourished. No distress.     Constitutional: He appears well-developed. He is not diaphoretic.  Non-toxic appearance. He does  not have a sickly appearance. He does not appear ill. No distress.   Patient appears thin. He is in no acute distress.   SLP eval 12/3                                          Hosp Med PN 12/3            ED Prov note 11/29                                                                            Doctor, Please specify diagnosis or diagnoses associated with above clinical findings.    Provider Use Only      _x__underweight    ___other______________________                                                                                                                   [  ] Clinically Undetermined

## 2019-12-04 NOTE — PLAN OF CARE
12/04/19 1627   Final Note   Assessment Type Final Discharge Note   Anticipated Discharge Disposition Home-Health

## 2019-12-04 NOTE — ASSESSMENT & PLAN NOTE
Added flomax.  Today, PVR was about 500 ml.  Pt might need an indwelling shirley for several days.  Will continue monitoring.

## 2019-12-04 NOTE — PROGRESS NOTES
[facility] Medicine  Progress Note    Patient Name: Toni Kamara  MRN: [id number]  Patient Class: IP- Inpatient   Admission Date: 11/29/2019  Length of Stay: 4 days  Attending Physician: Efrain Manuel MD  Primary Care Provider: Harinder Stout MD        Subjective:     Principal Problem:Complicated UTI (urinary tract infection)        HPI:  Toni Kamara is a 79-year-old male with PMHx significant for Parkinson's disease, BPH, HTN, arthritis, and anxiety.  He presented in the ED the recommendation of his PCP further evaluation of leg swelling and fatigue.  Pt was recently D/C on 11/26 after inpatient treatment for pseudomonal UTI.  Pt began oral Cipro on the morning of 11/26 after culture and sensitivity results.  Pt was D/C on oral Cipro and has been compliant with medications since that time.  His daughter is concerned over increasing fatigue and weakness as well as bilateral lower extremity edema. Pt denies any Hx of edema. He also endorses some cracking and popping to his left knee as well as left knee pain. The pain is not any sensation, however the popping and cracking is.  His daughter became concerned over possibility of adverse reaction to Cipro.  His PCP was phoned, symptoms were reported, and he was recommended to follow up in ED for further evaluation and IV antibiotic therapy for treatment of his pseudomonal UTI given limited sensitivities.  He was D/C on a 5 day course and has completed 3 of 5 days.  Pt reports ongoing dysuria since discharge, though did urinate in ED without symptoms.  He denies any chest pain, SOB, cough, nausea, vomiting, abdominal pain, HA, or falls.  He denies any Hx of blood clots and denies any Hx of lower extremity edema. She is admitted to service of hospital Medicine for additional evaluation and management.  Other pertinent medical Hx as below:    Overview/Hospital Course:  No notes on file    Interval History:  No new complaints.  No fevers.   Midline IV was placed today.  Home antibiotic infusions were set up.  He had high post-void residual urine measurements late this afternoon.    Review of Systems   Constitutional: Negative for fatigue and fever.   Respiratory: Negative for cough and shortness of breath.    Cardiovascular: Negative for chest pain.   Gastrointestinal: Negative for abdominal pain.     Objective:     Vital Signs (Most Recent):  Temp: 98 °F (36.7 °C) (12/03/19 1934)  Pulse: 89 (12/03/19 1934)  Resp: 18 (12/03/19 1934)  BP: (!) 150/70 (12/03/19 1934)  SpO2: 97 % (12/03/19 1934) Vital Signs (24h Range):  Temp:  [96.3 °F (35.7 °C)-98 °F (36.7 °C)] 98 °F (36.7 °C)  Pulse:  [71-89] 89  Resp:  [16-18] 18  SpO2:  [94 %-98 %] 97 %  BP: (100-150)/(56-72) 150/70     Weight: 45.6 kg (100 lb 8.5 oz)  Body mass index is 15.75 kg/m².    Intake/Output Summary (Last 24 hours) at 12/3/2019 2114  Last data filed at 12/3/2019 1200  Gross per 24 hour   Intake 470 ml   Output --   Net 470 ml      Physical Exam   Constitutional: He is oriented to person, place, and time. He appears well-developed and well-nourished. No distress.   HENT:   Head: Normocephalic and atraumatic.   Eyes: Pupils are equal, round, and reactive to light.   Neck: Neck supple. No thyromegaly present.   Cardiovascular: Normal rate and regular rhythm. Exam reveals no gallop and no friction rub.   No murmur heard.  Pulmonary/Chest: Effort normal and breath sounds normal. No respiratory distress. He has no wheezes.   Abdominal: Soft. Bowel sounds are normal. He exhibits no distension. There is no tenderness. There is no guarding.   Musculoskeletal: Normal range of motion. He exhibits edema (in lower extremities).   Neurological: He is alert and oriented to person, place, and time.   Skin: Skin is warm and dry. No erythema.   Psychiatric: He has a normal mood and affect.       Significant Labs: All pertinent labs within the past 24 hours have been reviewed.    Significant Imaging: I have  "reviewed all pertinent imaging results/findings within the past 24 hours.      Assessment/Plan:      * Complicated UTI (urinary tract infection)  Pt with pseudomonal UTI.    Dr. Diego discussed oral therapy with daughter but dtr is adamant that Mr. Kamara not get cipro.  Continue IV abx for now.   He will get infusions of meropenem twice a day at home.  Midline access placed.    Acute prostatitis due to Pseudomonas  See "complicated UTI"      Lower extremity edema  Uncertain etiology, seems unlikely this is related to Cipro.   Nonetheless, cipro was added to his allergy list.  Resolved after a dose of Lasix.  Vein US neg for thrombus.    Anxiety disorder   Chronic, stable.  Continue clonazepam as per outpatient regimen.  Monitor clinically      Essential hypertension  Chronic, controlled.  Latest blood pressure and vitals reviewed-   Temp:  [97 °F (36.1 °C)-97.7 °F (36.5 °C)]   Pulse:  [70-91]   Resp:  [14-18]   BP: (101-135)/(51-67)   SpO2:  [95 %-97 %] .   Home meds for hypertension were reviewed and noted below. Will resume his losartan..  outpt Hypertension Medications             losartan (COZAAR) 50 MG tablet Take 1 tablet (50 mg total) by mouth once daily.        Will utilize p.r.n. blood pressure medication only if patient's blood pressure greater than  180/110 and he develops symptoms such as worsening chest pain or shortness of breath.        Parkinson's disease dementia  Chronic, stable.  Continue Sinemet per outpatient regimen.  Pain fall precautions.  Utilize nonpharmacologic modalities to prevent delirium for which he is high risk.      BPH (benign prostatic hyperplasia)  Added flomax.  Today, PVR was about 500 ml.  Pt might need an indwelling shirley for several days.  Will continue monitoring.        VTE Risk Mitigation (From admission, onward)         Ordered     enoxaparin injection 40 mg  Daily      11/29/19 2207     IP VTE HIGH RISK PATIENT  Once      11/29/19 2207                      Efrain HOOVER " MD Kaleb  Department of Hospital Medicine   [facility]

## 2019-12-04 NOTE — PT/OT/SLP PROGRESS
Physical Therapy Treatment    Patient Name:  Toni Kamara   MRN:  [id number]    Recommendations:     Discharge Recommendations:  home health OT, home health PT, home with home health   Discharge Equipment Recommendations: wheelchair(provider mentioned pt sometimes freezes out and would be best to have a W/C)   Barriers to discharge: None    Assessment:     Toni Kamara is a 79 y.o. male admitted with a medical diagnosis of Complicated UTI (urinary tract infection).  He presents with the following impairments/functional limitations:  weakness, impaired self care skills, impaired functional mobilty, gait instability, decreased lower extremity function, decreased safety awareness . Tolerated treatment well. Dressed , sitting in chair. Expresses desire to be discharged.    Rehab Prognosis: Good; patient would benefit from acute skilled PT services to address these deficits and reach maximum level of function.    Recent Surgery: * No surgery found *      Plan:     During this hospitalization, patient to be seen 6 x/week to address the identified rehab impairments via gait training, therapeutic activities, therapeutic exercises and progress toward the following goals:    · Plan of Care Expires:  12/21/19    Subjective     Chief Complaint: none stated    Patient/Family Comments/goals: to return home today.  Pain/Comfort:  · Pain Rating 1: 0/10      Objective:     Communicated with nurse Hamilton prior to session.  Patient found up in chair with (sitter at bedside) upon PT entry to room.     General Precautions: Standard, aspiration, fall   Orthopedic Precautions:N/A   Braces: N/A     Functional Mobility:  · Transfers:     · Sit to Stand:  contact guard assistance with rolling walker  · Gait: 300' with rw and CGA      AM-PAC 6 CLICK MOBILITY          Therapeutic Activities and Exercises:   Performed BLE exercises at 10 reps each ; TKE's, hip abd /add flexion, AP's, marches.    Patient left up in chair with all lines intact, call  button in reach, chair alarm on, nurse Joy notified and sitter present..    GOALS:   Multidisciplinary Problems     Physical Therapy Goals     Not on file          Multidisciplinary Problems (Resolved)        Problem: Physical Therapy Goal    Goal Priority Disciplines Outcome Goal Variances Interventions   Physical Therapy Goal   (Resolved)     PT, PT/OT Met     Description:  Goals to be met by: 19     Patient will increase functional independence with mobility by performin. Supine to sit with Modified [place]  2. Sit to stand transfer with Stand-by Assistance  3. Bed to chair transfer with Stand-by Assistance using Rolling Walker  4. Gait  x 300 feet with Stand-by Assistance using Rolling Walker.   5. Lower extremity exercise program x20 reps per handout, with supervision                      Time Tracking:     PT Received On: 19  PT Start Time: 0950     PT Stop Time: 1011  PT Total Time (min): 21 min     Billable Minutes: Gait Training 11min and Therapeutic Exercise 10min    Treatment Type: Treatment  PT/PTA: PTA     PTA Visit Number: 3     Gloria Whalen, PEÑA  2019

## 2019-12-04 NOTE — PT/OT/SLP PROGRESS
Occupational Therapy      Patient Name:  Toni Kamara   MRN:  [id number]    Patient not seen today secondary to actively being discharged by nursing staff.  DONALD Washington  12/4/2019

## 2019-12-04 NOTE — SUBJECTIVE & OBJECTIVE
Interval History:  No new complaints.  No fevers.  Midline IV was placed today.  Home antibiotic infusions were set up.  He had high post-void residual urine measurements late this afternoon.    Review of Systems   Constitutional: Negative for fatigue and fever.   Respiratory: Negative for cough and shortness of breath.    Cardiovascular: Negative for chest pain.   Gastrointestinal: Negative for abdominal pain.     Objective:     Vital Signs (Most Recent):  Temp: 98 °F (36.7 °C) (12/03/19 1934)  Pulse: 89 (12/03/19 1934)  Resp: 18 (12/03/19 1934)  BP: (!) 150/70 (12/03/19 1934)  SpO2: 97 % (12/03/19 1934) Vital Signs (24h Range):  Temp:  [96.3 °F (35.7 °C)-98 °F (36.7 °C)] 98 °F (36.7 °C)  Pulse:  [71-89] 89  Resp:  [16-18] 18  SpO2:  [94 %-98 %] 97 %  BP: (100-150)/(56-72) 150/70     Weight: 45.6 kg (100 lb 8.5 oz)  Body mass index is 15.75 kg/m².    Intake/Output Summary (Last 24 hours) at 12/3/2019 2114  Last data filed at 12/3/2019 1200  Gross per 24 hour   Intake 470 ml   Output --   Net 470 ml      Physical Exam   Constitutional: He is oriented to person, place, and time. He appears well-developed and well-nourished. No distress.   HENT:   Head: Normocephalic and atraumatic.   Eyes: Pupils are equal, round, and reactive to light.   Neck: Neck supple. No thyromegaly present.   Cardiovascular: Normal rate and regular rhythm. Exam reveals no gallop and no friction rub.   No murmur heard.  Pulmonary/Chest: Effort normal and breath sounds normal. No respiratory distress. He has no wheezes.   Abdominal: Soft. Bowel sounds are normal. He exhibits no distension. There is no tenderness. There is no guarding.   Musculoskeletal: Normal range of motion. He exhibits edema (in lower extremities).   Neurological: He is alert and oriented to person, place, and time.   Skin: Skin is warm and dry. No erythema.   Psychiatric: He has a normal mood and affect.       Significant Labs: All pertinent labs within the past 24 hours  have been reviewed.    Significant Imaging: I have reviewed all pertinent imaging results/findings within the past 24 hours.

## 2019-12-04 NOTE — ASSESSMENT & PLAN NOTE
Pt with pseudomonal UTI.    Dr. Diego discussed oral therapy with daughter but dtr is adamant that Mr. Kamara not get cipro.  Continue IV abx for now.   He will get infusions of meropenem twice a day at home.  Midline access placed.

## 2019-12-04 NOTE — PLAN OF CARE
· 12/4/2019 9:59:55 AM Completed  Denae Bassett  · 12/4/2019 9:37:22 AM Accepted  Mara [email or web address]  · 12/3/2019 3:55:22 PM New: pt already active with you. pending PHN auth. discharging today  Myah Dai   The pt is accepted by Nick PATEL via [facility]. Pts AVS updated and discharge destination booked. Denae Bassett, ESCOBAR     12/04/19 1000   Post-Acute Status   Post-Acute Authorization Home Health/Hospice   Home Health/Hospice Status Set-up Complete

## 2019-12-05 NOTE — DISCHARGE SUMMARY
[facility] Hospital Medicine  Discharge Summary      Patient Name: Toni Kamara  MRN: [id number]  Admission Date: 11/29/2019  Hospital Length of Stay: 5 days  Discharge Date and Time: 12/4/2019 11:19 AM  Attending Physician: No att. providers found   Discharging Provider: Efrain Manuel MD  Primary Care Provider: Harinder Stout MD      HPI:   Toni Kamara is a 79-year-old male with PMHx significant for Parkinson's disease, BPH, HTN, arthritis, and anxiety.  He presented in the ED the recommendation of his PCP further evaluation of leg swelling and fatigue.  Pt was recently D/C on 11/26 after inpatient treatment for pseudomonal UTI.  Pt began oral Cipro on the morning of 11/26 after culture and sensitivity results.  Pt was D/C on oral Cipro and has been compliant with medications since that time.  His daughter is concerned over increasing fatigue and weakness as well as bilateral lower extremity edema. Pt denies any Hx of edema. He also endorses some cracking and popping to his left knee as well as left knee pain. The pain is not any sensation, however the popping and cracking is.  His daughter became concerned over possibility of adverse reaction to Cipro.  His PCP was phoned, symptoms were reported, and he was recommended to follow up in ED for further evaluation and IV antibiotic therapy for treatment of his pseudomonal UTI given limited sensitivities.  He was D/C on a 5 day course and has completed 3 of 5 days.  Pt reports ongoing dysuria since discharge, though did urinate in ED without symptoms.  He denies any chest pain, SOB, cough, nausea, vomiting, abdominal pain, HA, or falls.  He denies any Hx of blood clots and denies any Hx of lower extremity edema. She is admitted to service of hospital Medicine for additional evaluation and management.  Other pertinent medical Hx as below:    * No surgery found *      Hospital Course:   Dr. Diego consulted on this patient.  She discussed management  "with patient's daughter.  Daughter refused to let him take Cipro again.  She felt that the Cipro had caused his legs to swell and his knees to "pop."  Other family members of hers had serious injury from taking Cipro.  Johnathon said the only other option was to put him on IVAB through home infusion.  We arranged for him to receive meropenem twice a day for two weeks at home.  He had an uneventful hospital stay.  He had somewhat high post-void residual bladder volumes, but later, he was able to void most of that urine out.  He did not need catheterization.  SLP saw him and recommended dental soft diet with thin liquids.    Physical Exam   Constitutional: He is oriented to person, place, and time. He appears well-developed and well-nourished. No distress.   HENT:   Head: Normocephalic and atraumatic.   Eyes: Pupils are equal, round, and reactive to light.   Neck: Neck supple. No thyromegaly present.   Cardiovascular: Normal rate and regular rhythm. Exam reveals no gallop and no friction rub.   No murmur heard.  Pulmonary/Chest: Effort normal and breath sounds normal. No respiratory distress. He has no wheezes.   Abdominal: Soft. Bowel sounds are normal. He exhibits no distension. There is no tenderness. There is no guarding.      Consults:   Consults (From admission, onward)        Status Ordering Provider     Inpatient consult to Infectious Diseases  Once     Provider:  MD Dougie Yang JESSICA M.     Inpatient consult to Social Work/Case Management  Once     Provider:  (Not yet assigned)    TERRI Oliver     IP consult to case management  Once     Provider:  (Not yet assigned)    TERRI Oliver            Final Active Diagnoses:    Diagnosis Date Noted POA    PRINCIPAL PROBLEM:  Complicated UTI (urinary tract infection) [N39.0] 11/29/2019 Yes    Acute prostatitis due to Pseudomonas [N41.0] 12/03/2019 Yes    Lower extremity edema [R60.0] 11/29/2019 Yes    Anxiety " disorder  [F41.9] 01/31/2019 Yes    Essential hypertension [I10] 12/28/2017 Yes    Parkinson's disease dementia [G20, F02.80] 04/13/2015 Yes    BPH (benign prostatic hyperplasia) [N40.0] 01/25/2012 Yes      Problems Resolved During this Admission:       Discharged Condition: good    Disposition: Home-Health Care Sv    Follow Up:  Follow-up Information     Harinder Stout MD On 12/9/2019.    Specialty:  Family Medicine  Why:  3:00 PM   Contact information:  [address] [facility] [address]  [phone]             [facility].    Specialties:  DME Provider, Home Health Services  Why:  Home Health  Contact information:  [address] [facility] [address]  [phone]                 Patient Instructions:      Referral to Home health   Referral Priority: Routine Referral Type: Home Health   Referral Reason: Specialty Services Required   Requested Specialty: Home Health Services   Number of Visits Requested: 1     Diet Adult Regular     Activity as tolerated       Significant Diagnostic Studies:   BMP  Lab Results   Component Value Date     12/02/2019    K 4.3 12/02/2019     12/02/2019    CO2 30 (H) 12/02/2019    BUN 23 12/02/2019    CREATININE 1.3 12/02/2019    CALCIUM 8.4 (L) 12/02/2019    ANIONGAP 6 (L) 12/02/2019    ESTGFRAFRICA >60 12/02/2019    EGFRNONAA 52 (A) 12/02/2019     Lab Results   Component Value Date    WBC 3.62 (L) 12/02/2019    HGB 9.8 (L) 12/02/2019    HCT 31.1 (L) 12/02/2019    MCV 98 12/02/2019     12/02/2019       WBC   Date Value Ref Range Status   12/02/2019 3.62 (L) 3.90 - 12.70 K/uL Final   12/01/2019 3.52 (L) 3.90 - 12.70 K/uL Final   11/30/2019 4.54 3.90 - 12.70 K/uL Final       Pending Diagnostic Studies:     None         Medications:  Reconciled Home Medications:      Medication List      START taking these medications    meropenem-0.9% sodium chloride 1 gram/50 mL Pgbk  Inject 50 mLs (1 g total) into the vein every 12 (twelve) hours.  for 14 days     tamsulosin 0.4 mg Cap  Commonly known as:  FLOMAX  Take 1 capsule (0.4 mg total) by mouth once daily.        CHANGE how you take these medications    losartan 50 MG tablet  Commonly known as:  COZAAR  Take 1 tablet (50 mg total) by mouth once daily.  What changed:    · when to take this  · reasons to take this        CONTINUE taking these medications    carbidopa-levodopa  mg  mg per tablet  Commonly known as:  SINEMET  Take 2 tablets by mouth 4 (four) times daily.     clonazePAM 1 MG tablet  Commonly known as:  KLONOPIN  Take 1.5 tablets (1.5 mg total) by mouth every evening.        STOP taking these medications    ciprofloxacin HCl 500 MG tablet  Commonly known as:  CIPRO            Indwelling Lines/Drains at time of discharge:   Lines/Drains/Airways     None                 Time spent on the discharge of patient: 33 minutes  Patient was seen and examined on the date of discharge and determined to be suitable for discharge.         Efrain Manuel MD  Department of Hospital Medicine  [facility]

## 2019-12-05 NOTE — HOSPITAL COURSE
"Dr. Diego consulted on this patient.  She discussed management with patient's daughter.  Daughter refused to let him take Cipro again.  She felt that the Cipro had caused his legs to swell and his knees to "pop."  Other family members of hers had serious injury from taking Cipro.  Johnathon said the only other option was to put him on IVAB through home infusion.  We arranged for him to receive meropenem twice a day for two weeks at home.  He had an uneventful hospital stay.  He had somewhat high post-void residual bladder volumes, but later, he was able to void most of that urine out.  He did not need catheterization.  SLP saw him and recommended dental soft diet with thin liquids.    Physical Exam   Constitutional: He is oriented to person, place, and time. He appears well-developed and well-nourished. No distress.   HENT:   Head: Normocephalic and atraumatic.   Eyes: Pupils are equal, round, and reactive to light.   Neck: Neck supple. No thyromegaly present.   Cardiovascular: Normal rate and regular rhythm. Exam reveals no gallop and no friction rub.   No murmur heard.  Pulmonary/Chest: Effort normal and breath sounds normal. No respiratory distress. He has no wheezes.   Abdominal: Soft. Bowel sounds are normal. He exhibits no distension. There is no tenderness. There is no guarding.   "

## 2019-12-06 ENCOUNTER — PATIENT OUTREACH (OUTPATIENT)
Dept: ADMINISTRATIVE | Facility: CLINIC | Age: 79
End: 2019-12-06

## 2019-12-06 NOTE — PATIENT INSTRUCTIONS
Bladder Infection, Male (Adult)    You have a bladder infection.  Urine is normally free of bacteria. But bacteria can get into the urinary tract from the skin around the rectum or it may travel in the blood from elsewhere in the body.  This is called a urinary tract infection (UTI). An infection can occur anywhere in the urinary tract. It could be in a kidney (pyelonephritis)or in the bladder (cystitis) and urethra (urethritis). The urethra is the tube that drains the urine from the bladder through the tip of the penis.  The most common place for a UTI is in the bladder. This is called a bladder infection. Most bladder infections are easily treated. They are not serious unless the infection spreads up to the kidney.  The terms bladder infection, UTI, and cystitis are often used to describe the same thing, but they arent always the same. Cystitis is an inflammation of the bladder. The most common cause of cystitis is an infection.   Keep in mind:  · Infections in the urine are called UTIs.  · Cystitis is usually caused by a UTI.  · Not all UTIs and cases of cystitis are bladder infections.  · Bladder infections are the most common type of cystitis.  Symptoms of a bladder infection  The infection causes inflammation in the urethra and bladder. This inflammation causes many of the symptoms. The most common symptoms of a bladder infection are:  · Pain or burning when urinating  · Having to go more often than usual  · Feeling like you need to go right away  · Only a small amount comes out  · Blood in urine  · Discomfort in your belly (abdomen), usually in the lower abdomen, above the pubic bone  · Cloudy, strong, or bad smelling urine  · Unable to urinate (retention)  · Urinary incontinence  · Fever  · Loss of appetite  Older adults may also feel confused.  Causes of a bladder infection  Bladder infections are not contagious. You can't get one from someone else, from a toilet seat, or from sharing a bath.  The most  common cause of bladder infections is bacteria from the bowels. The bacteria get onto the skin around the opening of the urethra. From there they can get into the urine and travel up to the bladder. This causes inflammation and an infection. This usually happens because of:  · An enlarged prostate  · Poor cleaning of the genitals  · Procedures that put a tube in your bladder, like a Jasso catheter  · Bowel incontinence  · Older age  · Not emptying your bladder (The urine stays there, giving the bacteria a chance to grow.)  · Dehydration (This allows urine to stay in the bladder longer.)  · Constipation (This can cause the bowels to push on the bladder or urethra and keep the bladder from emptying.)  Treatment  Bladder infections are treated with antibiotics. They usually clear up quickly without complications. Treatment helps prevent a more serious kidney infection.  Medicines  Medicines can help in the treatment of a bladder infection:  · You may have been given phenazopyridine to ease burning when you urinate. It will cause your urine to be bright orange. It can stain clothing.  · You may have been prescribed antibiotics. Take this medicine until you have finished it, even if you feel better. Taking all of the medicine will make sure the infection has cleared.  You can use acetaminophen or ibuprofen for pain, fever, or discomfort, unless another medicine was prescribed. You can also alternate them, or use both together. They work differently and are a different class of medicines, so taking them together is not an overdose. If you have chronic liver or kidney disease, talk with your healthcare provider before using these medicines. Also talk with your provider if youve had a stomach ulcer or GI bleeding or are taking blood thinner medicines.  Home care  Here are some guidelines to help you care for yourself at home:  · Drink plenty of fluids, unless your healthcare provider told you not to. Fluids will prevent  dehydration and flush out your bladder.  · Use good personal hygiene. Wipe from front to back after using the toilet, and clean your penis regularly. If you arent circumcised, retract the foreskin when cleaning.  · Urinate more frequently, and dont try to hold it in for long periods of time, if possible.  · Wear loose-fitting clothes and cotton underwear. Avoid tight-fitting pants. This helps keep you clean and dry.  · Change your diet to prevent constipation. This means eating more fresh foods and more fiber, and less junk and fatty foods.  · Avoid sex until your symptoms are gone.  · Avoid caffeine, alcohol, and spicy foods. These can irritate the bladder.  Follow-up care  Follow up with your healthcare provider, or as advised if all symptoms have not cleared up within 5 days. It is important to keep your follow-up appointment. You can talk with your provider to see if you need more tests of the urinary tract. This is especially important if you have infections that keep coming back.  If a culture was done, you will be told if your treatment needs to be changed. If directed, you can call to find out the results.  If X-rays were taken, you will be told of any findings that may affect your care.  Call 911  Call 911 if any of these occur:  · Trouble breathing  · Difficulty waking up  · Feeling confused  · Fainting or loss of consciousness  · Rapid heart rate  When to seek medical advice  Call your healthcare provider right away if any of these occur:  · Fever of 100.4ºF (38ºC) or higher, or as directed by your healthcare provider  · Your symptoms dont improve after 2 days of treatment  · Back or abdominal pain that gets worse  · Repeated vomiting, or you arent able to keep medicine down  · Weakness or dizziness  Date Last Reviewed: 10/1/2016  © 7626-5245 [employer]. [address] [place] [address], [place] [address]. All rights reserved. This information is not intended as a substitute for professional  medical care. Always follow your healthcare professional's instructions.

## 2019-12-09 ENCOUNTER — LAB VISIT (OUTPATIENT)
Dept: LAB | Facility: HOSPITAL | Age: 79
End: 2019-12-09
Attending: INTERNAL MEDICINE
Payer: MEDICARE

## 2019-12-09 ENCOUNTER — TELEPHONE (OUTPATIENT)
Dept: UROLOGY | Facility: CLINIC | Age: 79
End: 2019-12-09

## 2019-12-09 DIAGNOSIS — N39.0 URINARY TRACT INFECTION, SITE NOT SPECIFIED: Primary | ICD-10-CM

## 2019-12-09 LAB
ALBUMIN SERPL BCP-MCNC: 3.1 G/DL (ref 3.5–5.2)
ALP SERPL-CCNC: 69 U/L (ref 55–135)
ALT SERPL W/O P-5'-P-CCNC: 9 U/L (ref 10–44)
ANION GAP SERPL CALC-SCNC: 9 MMOL/L (ref 8–16)
AST SERPL-CCNC: 17 U/L (ref 10–40)
BASOPHILS # BLD AUTO: 0.03 K/UL (ref 0–0.2)
BASOPHILS NFR BLD: 0.8 % (ref 0–1.9)
BILIRUB SERPL-MCNC: 0.8 MG/DL (ref 0.1–1)
BUN SERPL-MCNC: 26 MG/DL (ref 8–23)
CALCIUM SERPL-MCNC: 8.9 MG/DL (ref 8.7–10.5)
CHLORIDE SERPL-SCNC: 100 MMOL/L (ref 95–110)
CO2 SERPL-SCNC: 30 MMOL/L (ref 23–29)
CREAT SERPL-MCNC: 0.9 MG/DL (ref 0.5–1.4)
DIFFERENTIAL METHOD: ABNORMAL
EOSINOPHIL # BLD AUTO: 0.1 K/UL (ref 0–0.5)
EOSINOPHIL NFR BLD: 2.6 % (ref 0–8)
ERYTHROCYTE [DISTWIDTH] IN BLOOD BY AUTOMATED COUNT: 14 % (ref 11.5–14.5)
ERYTHROCYTE [SEDIMENTATION RATE] IN BLOOD BY WESTERGREN METHOD: 26 MM/HR (ref 0–10)
EST. GFR  (AFRICAN AMERICAN): >60 ML/MIN/1.73 M^2
EST. GFR  (NON AFRICAN AMERICAN): >60 ML/MIN/1.73 M^2
GLUCOSE SERPL-MCNC: 76 MG/DL (ref 70–110)
HCT VFR BLD AUTO: 34.8 % (ref 40–54)
HGB BLD-MCNC: 11.2 G/DL (ref 14–18)
IMM GRANULOCYTES # BLD AUTO: 0.01 K/UL (ref 0–0.04)
IMM GRANULOCYTES NFR BLD AUTO: 0.3 % (ref 0–0.5)
LYMPHOCYTES # BLD AUTO: 1.3 K/UL (ref 1–4.8)
LYMPHOCYTES NFR BLD: 34.3 % (ref 18–48)
MCH RBC QN AUTO: 31.3 PG (ref 27–31)
MCHC RBC AUTO-ENTMCNC: 32.2 G/DL (ref 32–36)
MCV RBC AUTO: 97 FL (ref 82–98)
MONOCYTES # BLD AUTO: 0.3 K/UL (ref 0.3–1)
MONOCYTES NFR BLD: 7.3 % (ref 4–15)
NEUTROPHILS # BLD AUTO: 2.1 K/UL (ref 1.8–7.7)
NEUTROPHILS NFR BLD: 54.7 % (ref 38–73)
NRBC BLD-RTO: 0 /100 WBC
PLATELET # BLD AUTO: 207 K/UL (ref 150–350)
PMV BLD AUTO: 10.1 FL (ref 9.2–12.9)
POTASSIUM SERPL-SCNC: 4.2 MMOL/L (ref 3.5–5.1)
PROT SERPL-MCNC: 6.1 G/DL (ref 6–8.4)
RBC # BLD AUTO: 3.58 M/UL (ref 4.6–6.2)
SODIUM SERPL-SCNC: 139 MMOL/L (ref 136–145)
WBC # BLD AUTO: 3.85 K/UL (ref 3.9–12.7)

## 2019-12-09 PROCEDURE — 85025 COMPLETE CBC W/AUTO DIFF WBC: CPT

## 2019-12-09 PROCEDURE — 85651 RBC SED RATE NONAUTOMATED: CPT

## 2019-12-09 PROCEDURE — 80053 COMPREHEN METABOLIC PANEL: CPT

## 2019-12-09 PROCEDURE — 36415 COLL VENOUS BLD VENIPUNCTURE: CPT

## 2019-12-09 NOTE — TELEPHONE ENCOUNTER
----- Message from Ketan Harris sent at 12/9/2019  9:40 AM CST -----  Contact: Daughter, Bailey Avalos want to speak with a nurse regarding scheduling hospital follow up appointment this week no availability please call back at [phone]    Case number [id number]

## 2019-12-10 ENCOUNTER — HOSPITAL ENCOUNTER (EMERGENCY)
Facility: HOSPITAL | Age: 79
Discharge: HOME | End: 2019-12-10
Attending: EMERGENCY MEDICINE
Payer: MEDICARE

## 2019-12-10 ENCOUNTER — TELEPHONE (OUTPATIENT)
Dept: HOME HEALTH SERVICES | Facility: HOSPITAL | Age: 79
End: 2019-12-10

## 2019-12-10 VITALS
HEIGHT: 67 IN | BODY MASS INDEX: 15.7 KG/M2 | SYSTOLIC BLOOD PRESSURE: 136 MMHG | WEIGHT: 100 LBS | HEART RATE: 74 BPM | TEMPERATURE: 98 F | DIASTOLIC BLOOD PRESSURE: 80 MMHG | OXYGEN SATURATION: 96 % | RESPIRATION RATE: 18 BRPM

## 2019-12-10 DIAGNOSIS — M79.89 LEG SWELLING: Primary | ICD-10-CM

## 2019-12-10 LAB
ALBUMIN SERPL BCP-MCNC: 3.2 G/DL (ref 3.5–5.2)
ALP SERPL-CCNC: 83 U/L (ref 55–135)
ALT SERPL W/O P-5'-P-CCNC: 7 U/L (ref 10–44)
ANION GAP SERPL CALC-SCNC: 6 MMOL/L (ref 8–16)
AST SERPL-CCNC: 15 U/L (ref 10–40)
BASOPHILS # BLD AUTO: 0.02 K/UL (ref 0–0.2)
BASOPHILS NFR BLD: 0.5 % (ref 0–1.9)
BILIRUB SERPL-MCNC: 0.4 MG/DL (ref 0.1–1)
BUN SERPL-MCNC: 29 MG/DL (ref 8–23)
CALCIUM SERPL-MCNC: 9 MG/DL (ref 8.7–10.5)
CHLORIDE SERPL-SCNC: 104 MMOL/L (ref 95–110)
CO2 SERPL-SCNC: 31 MMOL/L (ref 23–29)
CREAT SERPL-MCNC: 1.1 MG/DL (ref 0.5–1.4)
DIFFERENTIAL METHOD: ABNORMAL
EOSINOPHIL # BLD AUTO: 0.1 K/UL (ref 0–0.5)
EOSINOPHIL NFR BLD: 1.9 % (ref 0–8)
ERYTHROCYTE [DISTWIDTH] IN BLOOD BY AUTOMATED COUNT: 14.2 % (ref 11.5–14.5)
EST. GFR  (AFRICAN AMERICAN): >60 ML/MIN/1.73 M^2
EST. GFR  (NON AFRICAN AMERICAN): >60 ML/MIN/1.73 M^2
GLUCOSE SERPL-MCNC: 103 MG/DL (ref 70–110)
HCT VFR BLD AUTO: 33.6 % (ref 40–54)
HGB BLD-MCNC: 10.6 G/DL (ref 14–18)
IMM GRANULOCYTES # BLD AUTO: 0.02 K/UL (ref 0–0.04)
LYMPHOCYTES # BLD AUTO: 1.1 K/UL (ref 1–4.8)
LYMPHOCYTES NFR BLD: 27.3 % (ref 18–48)
MCH RBC QN AUTO: 31.5 PG (ref 27–31)
MCHC RBC AUTO-ENTMCNC: 31.5 G/DL (ref 32–36)
MCV RBC AUTO: 100 FL (ref 82–98)
MONOCYTES # BLD AUTO: 0.4 K/UL (ref 0.3–1)
MONOCYTES NFR BLD: 9.4 % (ref 4–15)
NEUTROPHILS # BLD AUTO: 2.5 K/UL (ref 1.8–7.7)
NEUTROPHILS NFR BLD: 60.4 % (ref 38–73)
NRBC BLD-RTO: 0 /100 WBC
PLATELET # BLD AUTO: 170 K/UL (ref 150–350)
PMV BLD AUTO: 9.4 FL (ref 9.2–12.9)
POTASSIUM SERPL-SCNC: 4.2 MMOL/L (ref 3.5–5.1)
PROT SERPL-MCNC: 6.6 G/DL (ref 6–8.4)
RBC # BLD AUTO: 3.37 M/UL (ref 4.6–6.2)
SODIUM SERPL-SCNC: 141 MMOL/L (ref 136–145)
WBC # BLD AUTO: 4.14 K/UL (ref 3.9–12.7)

## 2019-12-10 PROCEDURE — 36415 COLL VENOUS BLD VENIPUNCTURE: CPT

## 2019-12-10 PROCEDURE — 99283 EMERGENCY DEPT VISIT LOW MDM: CPT

## 2019-12-10 PROCEDURE — 85025 COMPLETE CBC W/AUTO DIFF WBC: CPT

## 2019-12-10 PROCEDURE — 80053 COMPREHEN METABOLIC PANEL: CPT

## 2019-12-10 NOTE — ED PROVIDER NOTES
Encounter Date: 12/10/2019    SCRIBE #1 NOTE: I, Anant Bhakta , am scribing for, and in the presence of, Dr. Hadley.       History     Chief Complaint   Patient presents with    Leg Swelling     recent admit for same        Time seen by provider: 3:00 PM on 12/10/2019    Toni Kamara is a 79 y.o. male who presents to the ED with c/o bilateral leg swelling that has progressively gotten worse. The patient had recently been admitted for ongoing dysuria and leg swelling. Caregiver reports that the legs have gotten significantly bigger. Patient is able to walk with a walker. States that he has no trouble with breathing recently. He recently got a bilateral venous ultrasound to test for DVT by Dr. Son. PMHx includes BPH, Essential tremor, and Parkinson's disease. SHx includes an appendectomy, prostate surgery, and hemorrhoid surgery. Drug allergy of Ciprofloxacin noted.     The history is provided by the patient and a caregiver. The history is limited by the condition of the patient.     Review of patient's allergies indicates:   Allergen Reactions    Ciprofloxacin Other (See Comments)     Past Medical History:   Diagnosis Date    Anxiety 1/25/2012    Arthritis of knee 1/25/2012    Back pain 1/25/2012    BPH (benign prostatic hyperplasia) 1/25/2012    Essential tremor 3/19/2014    Kyphoscoliosis 1/25/2012    Parkinson's disease      Past Surgical History:   Procedure Laterality Date    APPENDECTOMY      FRACTURE SURGERY Left Dec 18, 2014    HEMORRHOID SURGERY      PROSTATE SURGERY  2008    TURP and had renal insu from obsr    TONSILLECTOMY       Family History   Problem Relation Age of Onset    Heart disease Mother 79        Coronary stent    Parkinsonism Father 83     Social History     Tobacco Use    Smoking status: Former Smoker    Smokeless tobacco: Never Used   Substance Use Topics    Alcohol use: No    Drug use: No     Review of Systems   Constitutional: Negative for fever.   HENT:  Negative for sore throat.    Respiratory: Negative for shortness of breath.    Cardiovascular: Positive for leg swelling. Negative for chest pain.   Gastrointestinal: Negative for nausea.   Genitourinary: Negative for dysuria.   Musculoskeletal: Negative for back pain.   Skin: Negative for rash.   Neurological: Negative for weakness.   Hematological: Does not bruise/bleed easily.       Physical Exam     Initial Vitals [12/10/19 1401]   BP Pulse Resp Temp SpO2   136/80 74 18 98.3 °F (36.8 °C) 96 %      MAP       --         Physical Exam    Nursing note and vitals reviewed.  Constitutional: He appears well-developed and well-nourished. He is not diaphoretic. No distress.   HENT:   Head: Normocephalic and atraumatic.   Mouth/Throat: Oropharynx is clear and moist.   Eyes: Conjunctivae are normal.   Neck: Neck supple.   Cardiovascular: Normal rate, regular rhythm, normal heart sounds and intact distal pulses. Exam reveals no gallop and no friction rub.    No murmur heard.  Pulses:       Posterior tibial pulses are 1+ on the right side, and 1+ on the left side.   Pulmonary/Chest: Breath sounds normal. He has no wheezes. He has no rhonchi. He has no rales.   Abdominal: Soft. He exhibits no distension. There is no tenderness.   Musculoskeletal: Normal range of motion.   Neurological: He is alert and oriented to person, place, and time.   5/5 distal strength and sensation. Decreased tone with slight resting tremor   Skin: No rash noted. No erythema.         ED Course   Procedures  Labs Reviewed   CBC W/ AUTO DIFFERENTIAL - Abnormal; Notable for the following components:       Result Value    RBC 3.37 (*)     Hemoglobin 10.6 (*)     Hematocrit 33.6 (*)     Mean Corpuscular Volume 100 (*)     Mean Corpuscular Hemoglobin 31.5 (*)     Mean Corpuscular Hemoglobin Conc 31.5 (*)     All other components within normal limits   COMPREHENSIVE METABOLIC PANEL - Abnormal; Notable for the following components:    CO2 31 (*)     BUN, Bld  29 (*)     Albumin 3.2 (*)     ALT 7 (*)     Anion Gap 6 (*)     All other components within normal limits          Imaging Results    None          Medical Decision Making:   History:   Old Medical Records: I decided to obtain old medical records.  Clinical Tests:   Lab Tests: Reviewed and Ordered            Scribe Attestation:   Scribe #1: I performed the above scribed service and the documentation accurately describes the services I performed. I attest to the accuracy of the note.    I, Dr. Giuliano Hadley, personally performed the services described in this documentation. All medical record entries made by the scribe were at my direction and in my presence.  I have reviewed the chart and agree that the record reflects my personal performance and is accurate and complete. Giuliano Hadley MD.  10:27 PM 12/10/2019    Toni Kamara is a 79 y.o. male presenting with persistent leg swelling largely unchanged compared to documented leg swelling. I feel this may be multifactorial including sedentary baseline activity as well as poor nutritional status with hypoalbuminemia and recent admission with likely IV fluid administration.  No sign of other acute complicating process.  Recent workup including 2D echo as well as bilateral lower extremity venous ultrasound reviewed.  Very low suspicion for acute congestive heart failure or DVT.  I do not think repeated these studies are indicated.  There is no distal neurovascular compromise.  No significant change in renal function on testing today.  He is appropriate for outpatient follow-up.  Elevation of the legs when sitting in bed also recommended.  Follow up with PCP.  Return precautions reviewed.                      Clinical Impression:       ICD-10-CM ICD-9-CM   1. Leg swelling M79.89 729.81         Disposition:   Disposition: Discharged  Condition: Stable                     Giuliano Hadley MD  12/10/19 2917

## 2019-12-10 NOTE — TELEPHONE ENCOUNTER
----- Message from Shaun Macias sent at 12/10/2019 12:40 PM CST -----  Contact: Yahaira  Type: Needs Medical Advice    Who Called:  [facility] home [facility]  Symptoms (please be specific):    How long has patient had these symptoms:    Pharmacy name and phone #:    Best Call Back Number:  [phone]  Additional Information: called to advise patient has severe edema in his legs. The only new medicine is meropenem lungs are clear no pain

## 2019-12-11 NOTE — ED NOTES
Upon discharge, patient is AAOx4, no cardiac or respiratory complications. Follow up care reviewed with patient and has been instructed to return to the ER if needed. Patient verbalized understanding and ambulated to the lobby without difficulty. JUSTO JANSEN

## 2019-12-11 NOTE — ED NOTES
"Toni Kamara presents to the ED with c/o bilateral lower extremity swelling that has been persistent for weeks. Patient states that patient was admitted for the same complainta few weeks ago. "His legs are more swollen that usual." Patient ambulates with a walker. Denies any SOB. Mucous membranes are pink and moist. Skin is warm, dry and intact. Lungs are clear bilaterally, respirations are regular and unlabored. Denies SOB, cough, congestion or rhinorrhea. BS active x4, no tenderness with palpation, abd is soft and not distended. Denies any appetite or activity change. S1S2, capillary refill is < 2 seconds. Denies dysuria, difficulty urinating, frequency, numbness, tingling or weakness. JUSTYNA VSS    "

## 2019-12-16 ENCOUNTER — LAB VISIT (OUTPATIENT)
Dept: LAB | Facility: HOSPITAL | Age: 79
End: 2019-12-16
Attending: INTERNAL MEDICINE
Payer: MEDICARE

## 2019-12-16 ENCOUNTER — OFFICE VISIT (OUTPATIENT)
Dept: FAMILY MEDICINE | Facility: CLINIC | Age: 79
End: 2019-12-16
Payer: MEDICARE

## 2019-12-16 VITALS
WEIGHT: 121.25 LBS | BODY MASS INDEX: 19.03 KG/M2 | SYSTOLIC BLOOD PRESSURE: 102 MMHG | HEIGHT: 67 IN | TEMPERATURE: 98 F | HEART RATE: 75 BPM | DIASTOLIC BLOOD PRESSURE: 70 MMHG

## 2019-12-16 DIAGNOSIS — R60.9 DEPENDENT EDEMA: ICD-10-CM

## 2019-12-16 DIAGNOSIS — G20.A1 DEMENTIA DUE TO PARKINSON'S DISEASE WITHOUT BEHAVIORAL DISTURBANCE: Primary | ICD-10-CM

## 2019-12-16 DIAGNOSIS — I10 ESSENTIAL HYPERTENSION: ICD-10-CM

## 2019-12-16 DIAGNOSIS — E44.0 MALNUTRITION OF MODERATE DEGREE: ICD-10-CM

## 2019-12-16 DIAGNOSIS — S42.292G: ICD-10-CM

## 2019-12-16 DIAGNOSIS — G20.A1 PARKINSON DISEASE, SYMPTOMATIC: ICD-10-CM

## 2019-12-16 DIAGNOSIS — N39.0 URINARY TRACT INFECTION, SITE NOT SPECIFIED: Primary | ICD-10-CM

## 2019-12-16 DIAGNOSIS — F02.80 DEMENTIA DUE TO PARKINSON'S DISEASE WITHOUT BEHAVIORAL DISTURBANCE: Primary | ICD-10-CM

## 2019-12-16 PROBLEM — R29.6 FREQUENT FALLS: Status: RESOLVED | Noted: 2018-07-15 | Resolved: 2019-12-16

## 2019-12-16 PROBLEM — E43 SEVERE MALNUTRITION: Status: RESOLVED | Noted: 2019-11-25 | Resolved: 2019-12-16

## 2019-12-16 PROBLEM — G93.41 ENCEPHALOPATHY, METABOLIC: Status: RESOLVED | Noted: 2018-09-04 | Resolved: 2019-12-16

## 2019-12-16 PROBLEM — N17.9 AKI (ACUTE KIDNEY INJURY): Status: RESOLVED | Noted: 2018-09-04 | Resolved: 2019-12-16

## 2019-12-16 LAB
ALBUMIN SERPL BCP-MCNC: 3.4 G/DL (ref 3.5–5.2)
ALP SERPL-CCNC: 64 U/L (ref 55–135)
ALT SERPL W/O P-5'-P-CCNC: 6 U/L (ref 10–44)
ANION GAP SERPL CALC-SCNC: 6 MMOL/L (ref 8–16)
AST SERPL-CCNC: 19 U/L (ref 10–40)
BASOPHILS # BLD AUTO: 0.03 K/UL (ref 0–0.2)
BASOPHILS NFR BLD: 1 % (ref 0–1.9)
BILIRUB SERPL-MCNC: 1.2 MG/DL (ref 0.1–1)
BUN SERPL-MCNC: 28 MG/DL (ref 8–23)
CALCIUM SERPL-MCNC: 8.9 MG/DL (ref 8.7–10.5)
CHLORIDE SERPL-SCNC: 102 MMOL/L (ref 95–110)
CO2 SERPL-SCNC: 30 MMOL/L (ref 23–29)
CREAT SERPL-MCNC: 1 MG/DL (ref 0.5–1.4)
DIFFERENTIAL METHOD: ABNORMAL
EOSINOPHIL # BLD AUTO: 0 K/UL (ref 0–0.5)
EOSINOPHIL NFR BLD: 1.3 % (ref 0–8)
ERYTHROCYTE [DISTWIDTH] IN BLOOD BY AUTOMATED COUNT: 13.6 % (ref 11.5–14.5)
ERYTHROCYTE [SEDIMENTATION RATE] IN BLOOD BY WESTERGREN METHOD: 35 MM/HR (ref 0–10)
EST. GFR  (AFRICAN AMERICAN): >60 ML/MIN/1.73 M^2
EST. GFR  (NON AFRICAN AMERICAN): >60 ML/MIN/1.73 M^2
GLUCOSE SERPL-MCNC: 136 MG/DL (ref 70–110)
HCT VFR BLD AUTO: 34 % (ref 40–54)
HGB BLD-MCNC: 10.8 G/DL (ref 14–18)
IMM GRANULOCYTES # BLD AUTO: 0.02 K/UL (ref 0–0.04)
IMM GRANULOCYTES NFR BLD AUTO: 0.7 % (ref 0–0.5)
LYMPHOCYTES # BLD AUTO: 0.8 K/UL (ref 1–4.8)
LYMPHOCYTES NFR BLD: 27.4 % (ref 18–48)
MCH RBC QN AUTO: 30.9 PG (ref 27–31)
MCHC RBC AUTO-ENTMCNC: 31.8 G/DL (ref 32–36)
MCV RBC AUTO: 97 FL (ref 82–98)
MONOCYTES # BLD AUTO: 0.2 K/UL (ref 0.3–1)
MONOCYTES NFR BLD: 7.8 % (ref 4–15)
NEUTROPHILS # BLD AUTO: 1.9 K/UL (ref 1.8–7.7)
NEUTROPHILS NFR BLD: 61.8 % (ref 38–73)
NRBC BLD-RTO: 0 /100 WBC
PLATELET # BLD AUTO: 147 K/UL (ref 150–350)
PMV BLD AUTO: 10.8 FL (ref 9.2–12.9)
POTASSIUM SERPL-SCNC: 4 MMOL/L (ref 3.5–5.1)
PROT SERPL-MCNC: 6.3 G/DL (ref 6–8.4)
RBC # BLD AUTO: 3.49 M/UL (ref 4.6–6.2)
SODIUM SERPL-SCNC: 138 MMOL/L (ref 136–145)
WBC # BLD AUTO: 3.07 K/UL (ref 3.9–12.7)

## 2019-12-16 PROCEDURE — 99214 OFFICE O/P EST MOD 30 MIN: CPT | Mod: S$GLB,,, | Performed by: FAMILY MEDICINE

## 2019-12-16 PROCEDURE — 99499 UNLISTED E&M SERVICE: CPT | Mod: S$GLB,,, | Performed by: FAMILY MEDICINE

## 2019-12-16 PROCEDURE — 85651 RBC SED RATE NONAUTOMATED: CPT

## 2019-12-16 PROCEDURE — 85025 COMPLETE CBC W/AUTO DIFF WBC: CPT

## 2019-12-16 PROCEDURE — 80053 COMPREHEN METABOLIC PANEL: CPT

## 2019-12-16 PROCEDURE — 99499 RISK ADDL DX/OHS AUDIT: ICD-10-PCS | Mod: S$GLB,,, | Performed by: FAMILY MEDICINE

## 2019-12-16 PROCEDURE — 99999 PR PBB SHADOW E&M-EST. PATIENT-LVL III: ICD-10-PCS | Mod: PBBFAC,,, | Performed by: FAMILY MEDICINE

## 2019-12-16 PROCEDURE — 99214 PR OFFICE/OUTPT VISIT, EST, LEVL IV, 30-39 MIN: ICD-10-PCS | Mod: S$GLB,,, | Performed by: FAMILY MEDICINE

## 2019-12-16 PROCEDURE — 99999 PR PBB SHADOW E&M-EST. PATIENT-LVL III: CPT | Mod: PBBFAC,,, | Performed by: FAMILY MEDICINE

## 2019-12-16 RX ORDER — OXYCODONE AND ACETAMINOPHEN 10; 325 MG/1; MG/1
1 TABLET ORAL EVERY 4 HOURS PRN
COMMUNITY
End: 2020-01-01 | Stop reason: SDUPTHER

## 2019-12-16 NOTE — PROGRESS NOTES
"Subjective:       Patient ID: Toni Kamara is a 79 y.o. male.    Chief Complaint: Hospital Follow Up (pt was seen at [facility] on 12/10/19 for bilateral leg swelling ) and Urinary Tract Infection (Pt is still recovering from uti )    He was here with his sitter.  He had hospitalization in November with urosepsis.  He was treated with Cipro but may have had some side effects.  After his last hospitalization he was treated with IV meropenem.  His UTI was Pseudomonas.  He was seen in the emergency room December 10th.  He was complaining of leg swelling.  The leg swelling is uncomfortable at times.  It seems to be worse later in the day.  He finishes IV antibiotics on Wednesday.  He is having no dysuria.  He still has some left shoulder pain from his left humeral fracture which had delayed healing.  He takes oxycodone at night to help him sleep.  His appetite is good and his bowel movements are okay.    Review of Systems   Constitutional: Positive for unexpected weight change (It looks like he has regained some lost weight.). Negative for fever.   Respiratory: Negative for chest tightness and shortness of breath.    Cardiovascular: Positive for leg swelling. Negative for chest pain and palpitations.   Gastrointestinal: Negative for abdominal pain.   Neurological: Positive for tremors and weakness.       Objective:     Blood pressure 102/70, pulse 75, temperature 98 °F (36.7 °C), temperature source Oral, height 5' 7" (1.702 m), weight 55 kg (121 lb 4.1 oz).      Physical Exam   Constitutional:   Thin man who is alert.  He does have a tremor and a weak voice.   Cardiovascular: Normal rate.   No murmur heard.  Frequent PVCs.   Pulmonary/Chest: Effort normal and breath sounds normal. No respiratory distress.   Abdominal: He exhibits no distension. There is no tenderness.   Musculoskeletal:   Bilateral edema which is worse on the right.  Minimally tender.  No redness.  No foot lesions.  Good pedal pulses.   Neurological: He is " alert. He exhibits abnormal muscle tone.       Assessment:       1. Dementia due to Parkinson's disease without behavioral disturbance    2. Dependent edema    3. Malnutrition of moderate degree    4. Essential hypertension    5. Closed 3-part fracture of proximal end of left humerus with delayed healing, subsequent encounter    6. Parkinson disease, symptomatic        Plan:       We discussed the possibility of intermittent Lasix and he declines.  I suggest that he put on calf high support stockings in the mornings.  That will help him control his swelling. He sleeps in a reclining lift chair.  Follow-up in 1 month.    his blood pressure is fine without losartan.  We will stay off.

## 2019-12-17 ENCOUNTER — TELEPHONE (OUTPATIENT)
Dept: INFECTIOUS DISEASES | Facility: CLINIC | Age: 79
End: 2019-12-17

## 2019-12-17 ENCOUNTER — TELEPHONE (OUTPATIENT)
Dept: FAMILY MEDICINE | Facility: CLINIC | Age: 79
End: 2019-12-17

## 2019-12-17 NOTE — TELEPHONE ENCOUNTER
Says tomorrow is his end of care is he finished or do you want to continue his iv medications? He does not have a follow up appointment scheduled with you.    Va Gonzalez Ma

## 2019-12-17 NOTE — TELEPHONE ENCOUNTER
----- Message from Shaun Macias sent at 12/17/2019  3:49 PM CST -----  Contact: Loly  Type: Needs Medical Advice    Who Called:  Loly patient's caregiver  Symptoms (please be specific):    How long has patient had these symptoms:    Pharmacy name and phone #:  [facility]  Best Call Back Number: [phone]  Additional Information: called requesting Rx for lasix for patient,patient now want the script.patient seen  yesterday

## 2019-12-18 ENCOUNTER — TELEPHONE (OUTPATIENT)
Dept: FAMILY MEDICINE | Facility: CLINIC | Age: 79
End: 2019-12-18

## 2019-12-18 ENCOUNTER — TELEPHONE (OUTPATIENT)
Dept: HOME HEALTH SERVICES | Facility: HOSPITAL | Age: 79
End: 2019-12-18

## 2019-12-18 RX ORDER — FUROSEMIDE 20 MG/1
20 TABLET ORAL DAILY PRN
Qty: 30 TABLET | Refills: 2 | Status: SHIPPED | OUTPATIENT
Start: 2019-12-18 | End: 2020-01-01

## 2019-12-18 NOTE — TELEPHONE ENCOUNTER
Lasix 20 mg daily as needed when leg swelling is uncomfortable. He should still get elastic stockings.

## 2019-12-18 NOTE — TELEPHONE ENCOUNTER
----- Message from Jerald Rod sent at 12/18/2019  9:36 AM CST -----  Contact: mayuri with bio script   Type: Needs Medical Advice    Who Called:  mayuri with bio script   Symptoms (please be specific):    How long has patient had these symptoms:    Pharmacy name and phone #:    Best Call Back Number:    Additional Information: called talking about pt iv antibiodics. They are ending today 12/18/19 wanted to know if its ok to discontinue and if they could get orders to remove his line.

## 2019-12-18 NOTE — TELEPHONE ENCOUNTER
IV antibiotics were ordered in hospital, ID provider stated since he did not order them he will defer to PCP. Please advise if order to DC can be given.

## 2019-12-18 NOTE — TELEPHONE ENCOUNTER
[facility] Order # [id number] with [facility] in [place] - Dr. Harinder Stout

## 2019-12-27 ENCOUNTER — TELEPHONE (OUTPATIENT)
Dept: HOME HEALTH SERVICES | Facility: HOSPITAL | Age: 79
End: 2019-12-27

## 2020-01-01 ENCOUNTER — TELEPHONE (OUTPATIENT)
Dept: FAMILY MEDICINE | Facility: CLINIC | Age: 80
End: 2020-01-01

## 2020-01-01 ENCOUNTER — HOSPITAL ENCOUNTER (EMERGENCY)
Facility: HOSPITAL | Age: 80
Discharge: HOME | End: 2020-04-21
Attending: EMERGENCY MEDICINE
Payer: MEDICARE

## 2020-01-01 ENCOUNTER — DOCUMENTATION ONLY (OUTPATIENT)
Dept: FAMILY MEDICINE | Facility: CLINIC | Age: 80
End: 2020-01-01

## 2020-01-01 ENCOUNTER — LAB VISIT (OUTPATIENT)
Dept: LAB | Facility: HOSPITAL | Age: 80
End: 2020-01-01
Attending: FAMILY MEDICINE
Payer: MEDICARE

## 2020-01-01 ENCOUNTER — PATIENT OUTREACH (OUTPATIENT)
Dept: ADMINISTRATIVE | Facility: OTHER | Age: 80
End: 2020-01-01

## 2020-01-01 ENCOUNTER — EXTERNAL HOME HEALTH (OUTPATIENT)
Dept: HOME HEALTH SERVICES | Facility: HOSPITAL | Age: 80
End: 2020-01-01
Payer: MEDICARE

## 2020-01-01 ENCOUNTER — LAB VISIT (OUTPATIENT)
Dept: LAB | Facility: HOSPITAL | Age: 80
End: 2020-01-01
Attending: INTERNAL MEDICINE
Payer: MEDICARE

## 2020-01-01 ENCOUNTER — TELEPHONE (OUTPATIENT)
Dept: UROLOGY | Facility: CLINIC | Age: 80
End: 2020-01-01

## 2020-01-01 ENCOUNTER — OFFICE VISIT (OUTPATIENT)
Dept: FAMILY MEDICINE | Facility: CLINIC | Age: 80
End: 2020-01-01
Payer: MEDICARE

## 2020-01-01 ENCOUNTER — PATIENT MESSAGE (OUTPATIENT)
Dept: ADMINISTRATIVE | Facility: HOSPITAL | Age: 80
End: 2020-01-01

## 2020-01-01 ENCOUNTER — NURSE TRIAGE (OUTPATIENT)
Dept: ADMINISTRATIVE | Facility: CLINIC | Age: 80
End: 2020-01-01

## 2020-01-01 VITALS
BODY MASS INDEX: 18.83 KG/M2 | HEIGHT: 67 IN | HEART RATE: 77 BPM | DIASTOLIC BLOOD PRESSURE: 78 MMHG | OXYGEN SATURATION: 100 % | WEIGHT: 120 LBS | SYSTOLIC BLOOD PRESSURE: 128 MMHG | TEMPERATURE: 99 F | RESPIRATION RATE: 20 BRPM

## 2020-01-01 DIAGNOSIS — N30.00 ACUTE CYSTITIS WITHOUT HEMATURIA: ICD-10-CM

## 2020-01-01 DIAGNOSIS — R41.82 AMS (ALTERED MENTAL STATUS): Primary | ICD-10-CM

## 2020-01-01 DIAGNOSIS — G20.A1 DEMENTIA DUE TO PARKINSON'S DISEASE WITHOUT BEHAVIORAL DISTURBANCE: Primary | ICD-10-CM

## 2020-01-01 DIAGNOSIS — E44.0 MALNUTRITION OF MODERATE DEGREE: ICD-10-CM

## 2020-01-01 DIAGNOSIS — F02.80 DEMENTIA DUE TO PARKINSON'S DISEASE WITHOUT BEHAVIORAL DISTURBANCE: Primary | ICD-10-CM

## 2020-01-01 DIAGNOSIS — Z98.890 STATUS POST-OPERATIVE REPAIR OF HIP FRACTURE: ICD-10-CM

## 2020-01-01 DIAGNOSIS — M41.9 KYPHOSCOLIOSIS: ICD-10-CM

## 2020-01-01 DIAGNOSIS — N30.00 ACUTE CYSTITIS WITHOUT HEMATURIA: Primary | ICD-10-CM

## 2020-01-01 DIAGNOSIS — E86.0 DEHYDRATION: ICD-10-CM

## 2020-01-01 DIAGNOSIS — F51.04 PSYCHOPHYSIOLOGICAL INSOMNIA: ICD-10-CM

## 2020-01-01 DIAGNOSIS — F41.9 ANXIETY: ICD-10-CM

## 2020-01-01 DIAGNOSIS — M54.9 BACK PAIN, UNSPECIFIED BACK LOCATION, UNSPECIFIED BACK PAIN LATERALITY, UNSPECIFIED CHRONICITY: Primary | ICD-10-CM

## 2020-01-01 DIAGNOSIS — J18.9 PNEUMONIA OF LEFT LOWER LOBE DUE TO INFECTIOUS ORGANISM: Primary | ICD-10-CM

## 2020-01-01 DIAGNOSIS — Z87.81 STATUS POST-OPERATIVE REPAIR OF HIP FRACTURE: ICD-10-CM

## 2020-01-01 DIAGNOSIS — F02.80 DEMENTIA DUE TO PARKINSON'S DISEASE WITHOUT BEHAVIORAL DISTURBANCE: ICD-10-CM

## 2020-01-01 DIAGNOSIS — R41.0 CONFUSION: Primary | ICD-10-CM

## 2020-01-01 DIAGNOSIS — G20.A1 DEMENTIA DUE TO PARKINSON'S DISEASE WITHOUT BEHAVIORAL DISTURBANCE: ICD-10-CM

## 2020-01-01 LAB
ALBUMIN SERPL BCP-MCNC: 4.4 G/DL (ref 3.5–5.2)
ALP SERPL-CCNC: 115 U/L (ref 55–135)
ALT SERPL W/O P-5'-P-CCNC: 6 U/L (ref 10–44)
ANION GAP SERPL CALC-SCNC: 14 MMOL/L (ref 8–16)
AST SERPL-CCNC: 18 U/L (ref 10–40)
BACTERIA #/AREA URNS HPF: ABNORMAL /HPF
BACTERIA #/AREA URNS HPF: ABNORMAL /HPF
BACTERIA UR CULT: ABNORMAL
BACTERIA UR CULT: ABNORMAL
BILIRUB SERPL-MCNC: 0.9 MG/DL (ref 0.1–1)
BILIRUB UR QL STRIP: NEGATIVE
BILIRUB UR QL STRIP: NEGATIVE
BUN SERPL-MCNC: 51 MG/DL (ref 8–23)
CALCIUM SERPL-MCNC: 10.5 MG/DL (ref 8.7–10.5)
CHLORIDE SERPL-SCNC: 103 MMOL/L (ref 95–110)
CLARITY UR: ABNORMAL
CLARITY UR: CLEAR
CO2 SERPL-SCNC: 27 MMOL/L (ref 23–29)
COLOR UR: YELLOW
COLOR UR: YELLOW
CREAT SERPL-MCNC: 1.2 MG/DL (ref 0.5–1.4)
EST. GFR  (AFRICAN AMERICAN): >60 ML/MIN/1.73 M^2
EST. GFR  (NON AFRICAN AMERICAN): 57 ML/MIN/1.73 M^2
GLUCOSE SERPL-MCNC: 124 MG/DL (ref 70–110)
GLUCOSE UR QL STRIP: NEGATIVE
GLUCOSE UR QL STRIP: NEGATIVE
HGB UR QL STRIP: ABNORMAL
HGB UR QL STRIP: ABNORMAL
KETONES UR QL STRIP: ABNORMAL
KETONES UR QL STRIP: NEGATIVE
LEUKOCYTE ESTERASE UR QL STRIP: ABNORMAL
LEUKOCYTE ESTERASE UR QL STRIP: ABNORMAL
MICROSCOPIC COMMENT: ABNORMAL
MICROSCOPIC COMMENT: ABNORMAL
NITRITE UR QL STRIP: NEGATIVE
NITRITE UR QL STRIP: NEGATIVE
PH UR STRIP: 7 [PH] (ref 5–8)
PH UR STRIP: 8 [PH] (ref 5–8)
POTASSIUM SERPL-SCNC: 4.7 MMOL/L (ref 3.5–5.1)
PROT SERPL-MCNC: 8 G/DL (ref 6–8.4)
PROT UR QL STRIP: ABNORMAL
PROT UR QL STRIP: NEGATIVE
RBC #/AREA URNS HPF: 0 /HPF (ref 0–4)
RBC #/AREA URNS HPF: 10 /HPF (ref 0–4)
SARS-COV-2 RDRP RESP QL NAA+PROBE: NEGATIVE
SODIUM SERPL-SCNC: 144 MMOL/L (ref 136–145)
SP GR UR STRIP: 1.01 (ref 1–1.03)
SP GR UR STRIP: 1.01 (ref 1–1.03)
URN SPEC COLLECT METH UR: ABNORMAL
URN SPEC COLLECT METH UR: ABNORMAL
UROBILINOGEN UR STRIP-ACNC: 1 EU/DL
UROBILINOGEN UR STRIP-ACNC: 1 EU/DL
WBC #/AREA URNS HPF: 45 /HPF (ref 0–5)
WBC #/AREA URNS HPF: >100 /HPF (ref 0–5)

## 2020-01-01 PROCEDURE — G0180 MD CERTIFICATION HHA PATIENT: HCPCS | Mod: ,,, | Performed by: FAMILY MEDICINE

## 2020-01-01 PROCEDURE — 87088 URINE BACTERIA CULTURE: CPT

## 2020-01-01 PROCEDURE — 87186 SC STD MICRODIL/AGAR DIL: CPT

## 2020-01-01 PROCEDURE — U0002 COVID-19 LAB TEST NON-CDC: HCPCS

## 2020-01-01 PROCEDURE — 81000 URINALYSIS NONAUTO W/SCOPE: CPT

## 2020-01-01 PROCEDURE — 87086 URINE CULTURE/COLONY COUNT: CPT

## 2020-01-01 PROCEDURE — 1159F PR MEDICATION LIST DOCUMENTED IN MEDICAL RECORD: ICD-10-PCS | Mod: 95,,, | Performed by: FAMILY MEDICINE

## 2020-01-01 PROCEDURE — 1101F PR PT FALLS ASSESS DOC 0-1 FALLS W/OUT INJ PAST YR: ICD-10-PCS | Mod: CPTII,95,, | Performed by: FAMILY MEDICINE

## 2020-01-01 PROCEDURE — 87077 CULTURE AEROBIC IDENTIFY: CPT

## 2020-01-01 PROCEDURE — 99214 PR OFFICE/OUTPT VISIT, EST, LEVL IV, 30-39 MIN: ICD-10-PCS | Mod: 95,,, | Performed by: FAMILY MEDICINE

## 2020-01-01 PROCEDURE — 1101F PT FALLS ASSESS-DOCD LE1/YR: CPT | Mod: CPTII,95,, | Performed by: FAMILY MEDICINE

## 2020-01-01 PROCEDURE — 1159F MED LIST DOCD IN RCRD: CPT | Mod: 95,,, | Performed by: FAMILY MEDICINE

## 2020-01-01 PROCEDURE — 99283 EMERGENCY DEPT VISIT LOW MDM: CPT | Mod: 25

## 2020-01-01 PROCEDURE — 80053 COMPREHEN METABOLIC PANEL: CPT

## 2020-01-01 PROCEDURE — G0180 PR HOME HEALTH MD CERTIFICATION: ICD-10-PCS | Mod: ,,, | Performed by: FAMILY MEDICINE

## 2020-01-01 PROCEDURE — 36415 COLL VENOUS BLD VENIPUNCTURE: CPT

## 2020-01-01 PROCEDURE — 99214 OFFICE O/P EST MOD 30 MIN: CPT | Mod: 95,,, | Performed by: FAMILY MEDICINE

## 2020-01-01 RX ORDER — CEPHALEXIN 500 MG/1
500 CAPSULE ORAL EVERY 12 HOURS
Qty: 20 CAPSULE | Refills: 0 | Status: SHIPPED | OUTPATIENT
Start: 2020-01-01 | End: 2020-01-01

## 2020-01-01 RX ORDER — OXYCODONE AND ACETAMINOPHEN 5; 325 MG/1; MG/1
1 TABLET ORAL 2 TIMES DAILY
Qty: 60 TABLET | Refills: 0 | Status: SHIPPED | OUTPATIENT
Start: 2020-01-01

## 2020-01-01 RX ORDER — CLONAZEPAM 1 MG/1
1.5 TABLET ORAL NIGHTLY
Qty: 135 TABLET | Refills: 0 | Status: SHIPPED | OUTPATIENT
Start: 2020-01-01 | End: 2020-01-01

## 2020-01-01 RX ORDER — CLONAZEPAM 1 MG/1
TABLET ORAL
Qty: 135 TABLET | Refills: 1 | Status: SHIPPED | OUTPATIENT
Start: 2020-01-01

## 2020-01-01 RX ORDER — OXYCODONE AND ACETAMINOPHEN 5; 325 MG/1; MG/1
1 TABLET ORAL
Status: DISCONTINUED | OUTPATIENT
Start: 2020-01-01 | End: 2020-01-01 | Stop reason: HOSPADM

## 2020-01-01 RX ORDER — TRAZODONE HYDROCHLORIDE 50 MG/1
50 TABLET ORAL NIGHTLY
Qty: 30 TABLET | Refills: 11 | Status: SHIPPED | OUTPATIENT
Start: 2020-01-01 | End: 2021-07-10

## 2020-01-01 RX ORDER — OXYCODONE AND ACETAMINOPHEN 10; 325 MG/1; MG/1
1 TABLET ORAL EVERY 12 HOURS PRN
Qty: 30 TABLET | Refills: 0 | Status: SHIPPED | OUTPATIENT
Start: 2020-01-01

## 2020-01-01 RX ORDER — CEFDINIR 300 MG/1
300 CAPSULE ORAL 2 TIMES DAILY
Qty: 10 CAPSULE | Refills: 0 | Status: SHIPPED | OUTPATIENT
Start: 2020-01-01 | End: 2020-01-01 | Stop reason: SDUPTHER

## 2020-01-01 RX ORDER — CEFDINIR 300 MG/1
300 CAPSULE ORAL 2 TIMES DAILY
Qty: 20 CAPSULE | Refills: 0 | Status: SHIPPED | OUTPATIENT
Start: 2020-01-01 | End: 2020-01-01

## 2020-01-01 RX ORDER — AMOXICILLIN AND CLAVULANATE POTASSIUM 875; 125 MG/1; MG/1
1 TABLET, FILM COATED ORAL 2 TIMES DAILY
Qty: 20 TABLET | Refills: 0 | Status: SHIPPED | OUTPATIENT
Start: 2020-01-01 | End: 2020-01-01

## 2020-01-01 RX ORDER — CEFDINIR 300 MG/1
300 CAPSULE ORAL 2 TIMES DAILY
Qty: 10 CAPSULE | Refills: 0 | Status: SHIPPED | OUTPATIENT
Start: 2020-01-01 | End: 2020-01-01

## 2020-01-06 RX ORDER — LOSARTAN POTASSIUM 50 MG/1
TABLET ORAL
Qty: 90 TABLET | Refills: 3 | Status: SHIPPED | OUTPATIENT
Start: 2020-01-06

## 2020-01-07 ENCOUNTER — TELEPHONE (OUTPATIENT)
Dept: HOME HEALTH SERVICES | Facility: HOSPITAL | Age: 80
End: 2020-01-07

## 2020-01-30 DIAGNOSIS — F51.04 PSYCHOPHYSIOLOGICAL INSOMNIA: ICD-10-CM

## 2020-01-30 RX ORDER — CLONAZEPAM 1 MG/1
1.5 TABLET ORAL NIGHTLY
Qty: 135 TABLET | Refills: 0 | Status: SHIPPED | OUTPATIENT
Start: 2020-01-30 | End: 2020-01-01 | Stop reason: SDUPTHER

## 2020-01-30 NOTE — TELEPHONE ENCOUNTER
Tried to reach pt. No answer. Left message for pt that request has been sent and to call back if any questions.

## 2020-01-30 NOTE — TELEPHONE ENCOUNTER
----- Message from Ceasar Hicks sent at 1/30/2020 10:03 AM CST -----  Contact: pt  Type:  RX Refill Request    Who Called:  pt  Refill or New Rx:  refill  RX Name and Strength:  clonazePAM (KLONOPIN) 1 MG tablet  How is the patient currently taking it? (ex. 1XDay):  Sig - Route: Take 1.5 tablets (1.5 mg total) by mouth every evening. - Oral   Is this a 30 day or 90 day RX:  90  Preferred Pharmacy with phone number:    [id number]  [place] - [address]  [place] [address]  Phone: [phone] Fax: [phone]    Local or Mail Order:  local  Ordering Provider:  ramesh Noguera Call Back Number:  [phone]   Additional Information:  Pt only has one dose left

## 2020-02-13 ENCOUNTER — TELEPHONE (OUTPATIENT)
Dept: FAMILY MEDICINE | Facility: CLINIC | Age: 80
End: 2020-02-13

## 2020-02-13 NOTE — TELEPHONE ENCOUNTER
----- Message from Kenia Hawkins sent at 2/13/2020  9:03 AM CST -----  Contact: Bailey/Daughter  Type: Needs Medical Advice  Who Called:  Bailey  Symptoms (please be specific):  UTI   How long has patient had these symptoms:  days  Best Call Back Number: [phone]  Additional Information: Bailey states patient has not slept all night and soon will be going to sleep, however she would like to take him to the urgent care later today around 2 and get a urinalyses and culture done there and is asking if patient will get the proper treatment needed at the urgent care.  Please call to advise .  Thanks!

## 2020-02-13 NOTE — TELEPHONE ENCOUNTER
Spoke with pt's daughter, advised of [facility] in [place] and to ask for a urine culture so we can be sure pt is placed on correct antibiotic if he does have an infection. She expressed understanding.

## 2020-02-16 ENCOUNTER — OFFICE VISIT (OUTPATIENT)
Dept: URGENT CARE | Facility: CLINIC | Age: 80
End: 2020-02-16
Payer: MEDICARE

## 2020-02-16 VITALS
BODY MASS INDEX: 18.83 KG/M2 | DIASTOLIC BLOOD PRESSURE: 66 MMHG | SYSTOLIC BLOOD PRESSURE: 111 MMHG | OXYGEN SATURATION: 97 % | WEIGHT: 120 LBS | HEIGHT: 67 IN | TEMPERATURE: 99 F | RESPIRATION RATE: 18 BRPM | HEART RATE: 69 BPM

## 2020-02-16 DIAGNOSIS — R35.0 FREQUENCY OF MICTURITION: Primary | ICD-10-CM

## 2020-02-16 LAB
BILIRUB UR QL STRIP: NEGATIVE
GLUCOSE UR QL STRIP: NEGATIVE
KETONES UR QL STRIP: NEGATIVE
LEUKOCYTE ESTERASE UR QL STRIP: POSITIVE
PH, POC UA: 5.5 (ref 5–8)
POC BLOOD, URINE: NEGATIVE
POC NITRATES, URINE: POSITIVE
PROT UR QL STRIP: NEGATIVE
SP GR UR STRIP: 1.02 (ref 1–1.03)
UROBILINOGEN UR STRIP-ACNC: NORMAL (ref 0.3–2.2)

## 2020-02-16 PROCEDURE — 81003 URINALYSIS AUTO W/O SCOPE: CPT | Mod: QW,S$GLB,, | Performed by: PHYSICIAN ASSISTANT

## 2020-02-16 PROCEDURE — 99214 OFFICE O/P EST MOD 30 MIN: CPT | Mod: 25,S$GLB,, | Performed by: PHYSICIAN ASSISTANT

## 2020-02-16 PROCEDURE — 81003 POCT URINALYSIS, DIPSTICK, AUTOMATED, W/O SCOPE: ICD-10-PCS | Mod: QW,S$GLB,, | Performed by: PHYSICIAN ASSISTANT

## 2020-02-16 PROCEDURE — 99214 PR OFFICE/OUTPT VISIT, EST, LEVL IV, 30-39 MIN: ICD-10-PCS | Mod: 25,S$GLB,, | Performed by: PHYSICIAN ASSISTANT

## 2020-02-16 RX ORDER — CEFUROXIME AXETIL 250 MG/1
250 TABLET ORAL 2 TIMES DAILY
Qty: 14 TABLET | Refills: 0 | Status: SHIPPED | OUTPATIENT
Start: 2020-02-16 | End: 2020-02-23

## 2020-02-16 NOTE — PROGRESS NOTES
"Subjective:       Patient ID: Toni Kamara is a 80 y.o. male.    Vitals:  height is 5' 7" (1.702 m) and weight is 54.4 kg (120 lb). His oral temperature is 98.8 °F (37.1 °C). His blood pressure is 111/66 and his pulse is 69. His respiration is 18 and oxygen saturation is 97%.     Chief Complaint: Urinary Tract Infection    Patient doesn't have any symptoms of a UTI.  Would like an urinalysis and culture done today.    Urinary Tract Infection    This is a new problem. Pertinent negatives include no chills, flank pain, frequency, nausea, urgency, vomiting or rash.       Constitution: Negative for chills and fever.   Cardiovascular: Negative for chest pain and palpitations.   Respiratory: Negative for cough and shortness of breath.    Gastrointestinal: Negative for nausea and vomiting.   Genitourinary: Negative for dysuria, frequency, urgency, urine decreased and flank pain.   Skin: Negative for rash and hives.   Allergic/Immunologic: Negative for hives.   Neurological: Negative for dizziness and headaches.       Objective:      Physical Exam   Constitutional: He is oriented to person, place, and time. He appears well-developed and well-nourished. No distress.   HENT:   Head: Normocephalic and atraumatic.   Right Ear: External ear normal.   Left Ear: External ear normal.   Nose: Nose normal. No nasal deformity. No epistaxis.   Mouth/Throat: Oropharynx is clear and moist and mucous membranes are normal.   Eyes: Conjunctivae and lids are normal.   Neck: Trachea normal, normal range of motion and phonation normal. Neck supple.   Cardiovascular: Normal rate, regular rhythm, normal heart sounds and intact distal pulses.   Pulmonary/Chest: Effort normal and breath sounds normal.   Abdominal: Soft. Normal appearance and bowel sounds are normal. He exhibits no distension. There is no tenderness. There is no CVA tenderness.   Musculoskeletal: Normal range of motion.   Neurological: He is alert and oriented to person, place, " and time. He has normal reflexes.   Skin: Skin is warm, dry, intact and not diaphoretic.   Psychiatric: He has a normal mood and affect. His speech is normal and behavior is normal. Judgment and thought content normal.   Nursing note and vitals reviewed.        Assessment:       1. Frequency of micturition        Plan:         Frequency of micturition  -     POCT Urinalysis, Dipstick, Automated, W/O Scope  -     Culture, Urine  Results for orders placed or performed in visit on 02/16/20   POCT Urinalysis, Dipstick, Automated, W/O Scope   Result Value Ref Range    POC Blood, Urine Negative Negative    POC Bilirubin, Urine Negative Negative    POC Urobilinogen, Urine normal 0.3 - 2.2    POC Ketones, Urine Negative Negative    POC Protein, Urine Negative Negative    POC Nitrates, Urine Positive (A) Negative    POC Glucose, Urine Negative Negative    pH, UA 5.5 5 - 8    POC Specific Gravity, Urine 1.025 1.003 - 1.029    POC Leukocytes, Urine Positive (A) Negative       Other orders  -     cefUROXime (CEFTIN) 250 MG tablet; Take 1 tablet (250 mg total) by mouth 2 (two) times daily. for 7 days  Dispense: 14 tablet; Refill: 0    Discussed if develops any fever, chills, nausea vomiting should bring directly to ED.  Otherwise we will call with culture results in a few days.  Patient's daughter needs to be called.  Phone number is in contact information    You must understand that you've received an Urgent Care treatment only and that you may be released before all your medical problems are known or treated. You, the patient, will arrange for follow up care as instructed.  Follow up with your PCP or specialty clinic as directed in the next 1-2 weeks if not improved or as needed.  You can call [phone] to schedule an appointment with the appropriate provider.  If your condition worsens we recommend that you receive another evaluation at the emergency room immediately or contact your primary medical clinics after hours  call service to discuss your concerns.  Please return here or go to the Emergency Department for any concerns or worsening of condition.

## 2020-02-16 NOTE — PATIENT INSTRUCTIONS

## 2020-02-20 ENCOUNTER — TELEPHONE (OUTPATIENT)
Dept: URGENT CARE | Facility: CLINIC | Age: 80
End: 2020-02-20

## 2020-02-20 NOTE — TELEPHONE ENCOUNTER
Patient is in the hospital, nurse called wanting results from urine culture. I informed the nurse results are still pending at this time.

## 2020-02-23 ENCOUNTER — TELEPHONE (OUTPATIENT)
Dept: URGENT CARE | Facility: CLINIC | Age: 80
End: 2020-02-23

## 2020-02-23 LAB
BACTERIA UR CULT: NORMAL
BACTERIA UR CULT: NORMAL

## 2020-04-21 NOTE — ED NOTES
Report called to Silva at [facility]. States they will arrange transport. No other needs identified. Pt updated and verbalizes understanding. Will monitor prn.

## 2020-04-21 NOTE — ED NOTES
Pt sent to ED from [facility] via [facility] for possible COVID. Pt had x-ray today that revealed Pneumonia. Pt denies chest pain, fever, or SOB. No complaints voiced. Pt placed on BP, pulse ox, cardiac monitor. VSS. Pt has been evaluated by MD and ED workup is in progress. No other needs identified. Will monitor prn.

## 2020-04-21 NOTE — ED PROVIDER NOTES
Encounter Date: 4/21/2020    SCRIBE #1 NOTE: I, Earnestine Sanders, am scribing for, and in the presence of, Bhargav Basurto MD.       History     Chief Complaint   Patient presents with    Pneumonia     sent from [facility]        Time seen by provider: 5:48 PM on 04/21/2020    Toni Kamara is a 80 y.o. male with a PMHx of Parkinson's disease and anxiety who presents to the ED via EMS with an onset of pneumonia. The patient was sent over from [facility] for a COVID-19 test. He is currently being treated with antibiotics for pneumonia for the past 3-5 days. They reported he was possibly aspirating from the left lower lung. The nursing home sent him here to get tested so he could get quicker results. The patient's COVID-19 test is negative. The patient denies any other symptoms at this time. The patient has a PSHx of appendectomy. The patient is a poor historian at baseline.       The history is provided by the patient and the nursing home.     Review of patient's allergies indicates:   Allergen Reactions    Ciprofloxacin Other (See Comments)     Past Medical History:   Diagnosis Date    Anxiety 1/25/2012    Arthritis of knee 1/25/2012    Back pain 1/25/2012    BPH (benign prostatic hyperplasia) 1/25/2012    Essential tremor 3/19/2014    Kyphoscoliosis 1/25/2012    Parkinson's disease      Past Surgical History:   Procedure Laterality Date    APPENDECTOMY      FRACTURE SURGERY Left Dec 18, 2014    HEMORRHOID SURGERY      PROSTATE SURGERY  2008    TURP and had renal insu from obsr    TONSILLECTOMY       Family History   Problem Relation Age of Onset    Heart disease Mother 79        Coronary stent    Parkinsonism Father 83     Social History     Tobacco Use    Smoking status: Former Smoker    Smokeless tobacco: Never Used   Substance Use Topics    Alcohol use: No    Drug use: No     Review of Systems   Constitutional: Negative for fever.   HENT: Negative for congestion.    Eyes: Negative for  visual disturbance.   Respiratory: Negative for wheezing.    Cardiovascular: Negative for chest pain.   Gastrointestinal: Negative for abdominal pain.   Genitourinary: Negative for dysuria.   Musculoskeletal: Negative for joint swelling.   Skin: Negative for rash.   Neurological: Negative for syncope.   Hematological: Does not bruise/bleed easily.   Psychiatric/Behavioral: Negative for confusion.       Physical Exam     Initial Vitals [04/21/20 1654]   BP Pulse Resp Temp SpO2   128/78 81 20 98.9 °F (37.2 °C) 100 %      MAP       --         Physical Exam    Nursing note and vitals reviewed.  Constitutional: He appears well-nourished.   Elderly.   HENT:   Head: Normocephalic and atraumatic.   Eyes: Conjunctivae and EOM are normal.   Neck: Normal range of motion. Neck supple. No thyroid mass present.   Cardiovascular: Normal rate, regular rhythm and normal heart sounds. Exam reveals no gallop and no friction rub.    No murmur heard.  Pulmonary/Chest: Breath sounds normal. He has no wheezes. He has no rhonchi. He has no rales.   Abdominal: Soft. Normal appearance and bowel sounds are normal. There is no tenderness.   Musculoskeletal:   Pill rolling high frequency upper extremity tremor.   Neurological: He is alert and oriented to person, place, and time. He has normal strength. No cranial nerve deficit or sensory deficit.   Skin: Skin is warm and dry. No rash noted. No erythema.   Psychiatric: He has a normal mood and affect. His speech is normal. Cognition and memory are normal.         ED Course   Procedures  Labs Reviewed   SARS-COV-2 RNA AMPLIFICATION, QUAL    Narrative:     What symptom criteria does the patient meet?->Cough          Imaging Results          X-Ray Chest AP Portable (Final result)  Result time 04/21/20 17:15:48    Final result by Jeanmarie Burgess MD (04/21/20 17:15:48)                 Impression:      Radiographic findings which suggest chronic obstructive airways disease.  Please correlate  clinically.  There has been interval resolution of blunting of the right costophrenic angle as compared to the previous study; however, there is a suggestion of new left lingular atelectasis and faint airspace opacity at the periphery of the left lung base.  A developing infiltrate at the left lung base is not excluded.      Electronically signed by: Ventura Burgess MD  Date:    04/21/2020  Time:    17:15             Narrative:    EXAMINATION:  XR CHEST AP PORTABLE    CLINICAL HISTORY:  Suspected Covid-19 Virus Infection;    TECHNIQUE:  A single portable upright AP chest radiograph was acquired.    COMPARISON:  Chest x-ray-11/29/2019.    FINDINGS:  The cardiac silhouette is at the upper limits of normal in size.  There is calcified atherosclerotic plaque deposition within the thoracic aorta.  No central pulmonary vascular congestion.  There are radiographic findings which suggest possible chronic obstructive airways disease.  There has been interval development of linear atelectasis within the left lingula.  There are reticular opacities present within the right lower lung zone, stable when compared to the previous study.  There is minimal new segmental airspace opacity present at the left lung base peripherally, possibly due to atelectasis and/or developing pneumonitis.  There is linear fibrotic scarring present in the right midlung zone.  No new pulmonary mass.  No pneumothorax.  The bones are osteopenic.  There is degenerative change of the thoracolumbar spine and AC joints.                                 Medical Decision Making:   History:   Old Medical Records: I decided to obtain old medical records.  Clinical Tests:   Lab Tests: Ordered and Reviewed  Radiological Study: Ordered and Reviewed  ED Management:  This patient was interviewed and examined emergently.  Initial vital signs are stable.  Patient has normal work of breathing with oxygen saturation of 100% on room air.  Radiographs were largely unremarkable  with exception of questionable left lower lobe infiltrate for which the patient is reportedly already being treated.  Coronavirus testing is negative.  On reassessment, the patient is seen sleeping and requested 1 of his Percocet for chronic pain.  He was provided this and is stable for transport back to his assisted living facility.  He is transferred in stable condition per EMS.            Scribe Attestation:   Scribe #1: I performed the above scribed service and the documentation accurately describes the services I performed. I attest to the accuracy of the note.    I, Dr. Bhargav Basurto, personally performed the services described in this documentation. All medical record entries made by the scribe were at my direction and in my presence.  I have reviewed the chart and agree that the record reflects my personal performance and is accurate and complete. Bhargav Basurto MD.  9:15 PM 04/21/2020                        Clinical Impression:       ICD-10-CM ICD-9-CM   1. Pneumonia of left lower lobe due to infectious organism J18.1 486         Disposition:   Disposition: Discharged  Condition: Stable     ED Disposition Condition    Discharge Stable        ED Prescriptions     None        Follow-up Information     Follow up With Specialties Details Why Contact Info    Harinder Stout MD Family Medicine Schedule an appointment as soon as possible for a visit   [address] [facility] [address]  [phone]                                       Bhargav Basurto MD  04/21/20 2839

## 2020-07-10 NOTE — PROGRESS NOTES
"[facility] Medicine  Progress Note    Patient Name: Toni Kamara  MRN: [id number]  Patient Class: OP- Observation   Admission Date: 11/14/2019  Length of Stay: 0 days  Attending Physician: Ventura Holder DO  Primary Care Provider: Harinder Stout MD        Subjective:     Principal Problem:Weakness generalized        HPI:  79 year male brought to ED for weakness, fatigue and falling at home. "Falling" = twice in past 3 days the patient slid off of his bed and onto the floor because he was too weak to hold himself up. No LOC. Did not strike head. No LOP/LOM. Suffers Parkinsonism. No CP/SOB. No history of orthopnea/PND. Laboratory analysis reveals pre-renal azotemia with dirty urine.  CT Head = no acute pathology. CXR = NAPD. EKG = no acute ischemic changes. Patient states he does NOT wish to be resuscitated in the event of cardio-pulomonary arrest    Overview/Hospital Course:  No notes on file    Interval History:  Still with generalized weakness    Review of Systems patient denies fever chills chest pain abdominal pain nausea vomiting diarrhea.  He still has generalized weakness.  Otherwise comprehensive review of systems unchanged  Objective:     Vital Signs (Most Recent):  Temp: 98 °F (36.7 °C) (11/16/19 1200)  Pulse: 95 (11/16/19 1200)  Resp: 16 (11/16/19 1200)  BP: 96/61 (11/16/19 1200)  SpO2: 95 % (11/16/19 1200) Vital Signs (24h Range):  Temp:  [97.7 °F (36.5 °C)-98.9 °F (37.2 °C)] 98 °F (36.7 °C)  Pulse:  [] 95  Resp:  [15-16] 16  SpO2:  [95 %-98 %] 95 %  BP: ()/(61-80) 96/61     Weight: 56.7 kg (125 lb)  Body mass index is 20.18 kg/m².    Intake/Output Summary (Last 24 hours) at 11/16/2019 1304  Last data filed at 11/16/2019 0600  Gross per 24 hour   Intake 1773.75 ml   Output 295 ml   Net 1478.75 ml      Physical Exam  Patient appears improved.  He is sitting in a chair caregiver is at bedside  HEENT sclerae nonicteric  Neck is supple  Lungs are clear moderate air " movement  Heart is regular rate min  Abdomen soft nontender  Extremities no edema   no Jasso  Psych patient is pleasant cooperative:  Neuro patient is alert he is oriented to place and name  Moves all extremities equal  Significant Labs:   BMP:   Recent Labs   Lab 11/16/19 0617     109     141   K 3.5  3.5     105   CO2 27  27   BUN 27*  27*   CREATININE 1.0  1.0   CALCIUM 8.3*  8.3*     CBC:   Recent Labs   Lab 11/14/19 2155 11/16/19 0617   WBC 12.21 6.77   HGB 12.7* 13.1*   HCT 38.8* 40.1   * 156     CMP:   Recent Labs   Lab 11/14/19 2155 11/16/19 0617    141  141   K 3.4* 3.5  3.5    105  105   CO2 27 27  27    109  109   BUN 46* 27*  27*   CREATININE 1.4 1.0  1.0   CALCIUM 8.5* 8.3*  8.3*   PROT 6.6  --    ALBUMIN 3.3*  --    BILITOT 1.7*  --    ALKPHOS 61  --    AST 21  --    ALT 8*  --    ANIONGAP 11 9  9   EGFRNONAA 47.8* >60.0  >60.0       Significant Imaging:      Assessment/Plan:      * Weakness generalized  Generalized weakness most likely multifactorial including volume depletion and underlying urinary tract infection  PTevaluation in progress      UTI (urinary tract infection)  Acute UTI present on admission  Empiric antibiotics  Urine culture pending      Prerenal azotemia  Will continue IV fluids      Hypokalemia  Will replace use sliding scale  Check labs in a.m.      VTE Risk Mitigation (From admission, onward)         Ordered     IP VTE HIGH RISK PATIENT  Once      11/15/19 0452              Will continue PT.  Continue IV fluids  Repeat labs in the a.m.  Will decide after discussion with PT if patient would be better in skilled nursing facility versus home with home physical therapy        Ventura Holder DO  Department of Hospital Medicine   [facility]   139

## 2020-07-10 NOTE — TELEPHONE ENCOUNTER
----- Message from Kenia Hawkins sent at 7/10/2020  2:53 PM CDT -----  Regarding: report  Contact: Dev/[facility] homehealth  Type: Needs Medical Advice  Who Called:  Rufinatuanne  Best Call Back Number: [phone]  Additional Information: Dev reports patient daughter mayuri told nurse patient was grouchy and seemed to forget things. Homehealth nurse stated patient is in good spirits no confusion. Urine clear no temperature and temperament is the same.  No signs of UTI.  Thanks!

## 2020-07-10 NOTE — TELEPHONE ENCOUNTER
----- Message from Torrey Ochoa sent at 7/10/2020  9:11 AM CDT -----  Contact: pt's daughter Bailey  Type: Needs Medical Advice    Who Called:  Bailey Noguera Call Back Number: [phone]  Additional Information: Would like to discuss treatment plan and how to  forward with the pt since he is bed bound. Please call to advise.

## 2020-07-10 NOTE — TELEPHONE ENCOUNTER
Spoke with pt's daughter, she states they have brought pt home from SNF on Saturday. He needs a few refills but was unsure of dose so I called his caretaker and verified meds and doses      [phone] Caretaker Dariana Mcdonough

## 2020-07-13 NOTE — TELEPHONE ENCOUNTER
Spoke with Yudith  nurse. She states pt's daughter believes pt may have a uti. Pt has been disoriented. Pt's sitter and  nurse have not noticed any abnormal behavior. She will collect specimen tomorrow and let us know results. Urine looks fine, no fever.

## 2020-07-13 NOTE — TELEPHONE ENCOUNTER
----- Message from Monik Salazar sent at 7/13/2020  2:17 PM CDT -----  Regarding: need orders for UTI  Type: Needs Medical Advice  Who Called:  aBiley daughter  Symptoms (please be specific):    How long has patient had these symptoms:    Pharmacy name and phone #:    Best Call Back Number:[phone] or (  phone)  Additional Information: pt daughter Bailey states that pt needs order for a UTI, medication this has been going on for over a week. Pt daughter stated that the pt has re occurring UTI need standing orders for this condition. Pt daugther would like to speak with someone in the provider office has concerns. Pls call to advise

## 2020-07-13 NOTE — TELEPHONE ENCOUNTER
----- Message from Hanna Daniels sent at 7/13/2020 12:56 PM CDT -----  Type: Needs Medical Advice  [facility]   Best Call Back Number:   Additional Information: Per nurse requesting an order for UA for possible bladder infection via shirley cath-please advise-thank you

## 2020-07-17 NOTE — TELEPHONE ENCOUNTER
----- Message from Monik Salazar sent at 7/17/2020  4:49 PM CDT -----  Regarding: Marti with [facility]  Type: Needs Medical Advice  Who Called:  Marti with [facility] home health  Symptoms (please be specific):    How long has patient had these symptoms:    Pharmacy name and phone #:    Best Call Back Number:   Additional Information: Marti stated that she faxed over the UA results just wanted to let your office know if you have any question call [phone]

## 2020-07-20 NOTE — TELEPHONE ENCOUNTER
----- Message from Hanna Daniels sent at 7/20/2020  8:11 AM CDT -----  Contact: @[facility] care  Type: Needs Medical Advice  Who Called:  Marti Noguera Call Back Number:   Additional Information: Per Home health nurse wanted to follow up to find out if UA results were received, states he does have an infection-please advise-thank you

## 2020-07-21 NOTE — TELEPHONE ENCOUNTER
----- Message from Bernadine Fountain sent at 7/21/2020 11:59 AM CDT -----  Contact: daughter  Type: Needs Medical Advice    Who Called:  daughter  Best Call Back Number: [phone]  Additional Information: Requesting a call back regarding pt medication and wants to speak with medication  antibiotic . Pt daughter stated that the wrong medication was prescribed . Pt has been having a UTI and she is wanting a antibiotic that treats pseudomas .  Please Advise ---Thank you      
I spoke with daughter Bailey for 15 minutes. She is not with her father. I also spoke with his sitter. She feels that his agitation and poor sleep is related to UTI. Previous pseumomonas  in May. I think he may have received IV tx in the nursing home then. She says he became weak and had hypoglycemia with cipro in November. That is the only oral I can find to treat pseudomonas. I do not have the culture. Go ahead with cefdinir for now and may try levaquin if not improved. She also is considering some therapy that improves biofilm.  She asks about chronic IV antibiotics. She also asks about IV fluids at home to perk him up. We will discuss with his home health nurse in the future.   
Pts daughter states pt's last couple of UTIs have been pseudomonas and she would like an antibiotic sent in that will be effective against it.   
all other ROS negative except as per HPI

## 2020-07-30 NOTE — PROGRESS NOTES
SOC Order # [id number]  Cert Period: 07/05 to 09/02/2020 with [facility] - Dr. Harinder Stout

## 2020-08-31 NOTE — TELEPHONE ENCOUNTER
----- Message from Rhea Fisher sent at 8/31/2020 12:55 PM CDT -----  Regarding: Rx  Contact: Rahel Le @ [phone]  Requesting an RX refill or new RX.  Is this a refill or new RX:    RX name and strength: oxyCODONE-acetaminophen (PERCOCET)  mg per tablet  Directions (copy/paste from chart):    Is this a 30 day or 90 day RX:    Local pharmacy or mail order pharmacy:  Local  [id number] [place] [place] [facility] [address]    Comments:

## 2020-09-16 NOTE — PROGRESS NOTES
I spoke with dtr, Jessica, on the phone for 15 minutes.  S[facility] not able to do [facility] the [facility]. He is bed bound since his hip fracture in February. Also the parkinsons. He has some dementia as well. Currently no UTI sx while taking a biofilm inhibitor called Interphase +.  He also sleeps better and is less paranoid. The current plan is for him to move to a small house in [place] near his daughter and continue with a sitter. They would like for me to be his PCP still via video visit. He has been prescribed Percocet for more than a year by his neurologist. The back pain was impairing his sleep and function. He also took it after his hip fracture.  Currently managing pain with ibuprofen with good success but dtr would like to have some on hand.     A. Chronic LBP and kyphosis  Parkinsons and dementia  Debility and bed bound  Recurrent UTI doing better.   P. Will refill percocet disp #30 to [facility]'s  Monitor BP and consider stopping losartan if low 100's. Not taking lasix currently. Still taking trazodone

## 2020-10-05 NOTE — TELEPHONE ENCOUNTER
Spoke with pt's daughter, she states pt is having uti sx. He is irritable, not sleeping well. He fixates on small details and can't carry a conversation. No c/o dysuria or flank pain, no fever. No appetite. She is requesting abx.

## 2020-10-06 NOTE — TELEPHONE ENCOUNTER
----- Message from Becca Kingston sent at 10/5/2020  1:40 PM CDT -----  Contact: call  pt rashaad alamo /[phone]    Type:  RX Refill Request    Who Called: call  pt rashaad alamo /[phone]  RefillRX Name and Strength:       pt  has a  uti  needs an antibotic //  was   treated  in July   would   like  that one   again// no   cipro   Preferred Pharmacy with phone number:    [facility] [address] [facility], [place] - [address] [address]  Phone: [phone] Fax: [phone]  Best Call Back Number:  call  pt rashaad alamo /[phone]  Additional Information:   please call  for details

## 2020-11-12 NOTE — TELEPHONE ENCOUNTER
Spoke with pt's daughter, she is asking if there is an option for a saline port or something for pt. She said he sleeps 15 hours a day, is not able to sit up and reach for a glass of water on his own, has trouble swallowing due to parkinsons and a caretaker has to consistently make him drink sips of water but it is a challenge as he sleeps most of the day. She believes his constant struggle with dehydration is the cause of some of his fatigue and his recurring UTIs. She is asking for an alternative way to keep him hydrated.

## 2020-11-12 NOTE — TELEPHONE ENCOUNTER
----- Message from Cecile Tobin MA sent at 11/12/2020 10:49 AM CST -----  Contact: patient daughter Anjelica  Type: part 2 of last message     Who Called:  patient rashaad Avalos       Best Call Back Number: [phone]    Additional Information: can patient get a port (saline port )put in for hydrated per daughter Bailey     To help prevent UTI and fatigue per daughter

## 2020-11-12 NOTE — TELEPHONE ENCOUNTER
Ana TINOCO Staff   Phone Number: [phone]             Good afternoon,     Referral received. Unfortunately, we are not in network with pt's insurance.     -[facility] takes [facility]

## 2020-11-12 NOTE — TELEPHONE ENCOUNTER
----- Message from Hanna Daniels sent at 11/12/2020  2:20 PM CST -----  Type:  Patient Returning Call    Who Called:  Daughter   Who Left Message for Patient:  Diane   Does the patient know what this is regarding?:  yes   Best Call Back Number:     Additional Information:  Please advise-thank you

## 2020-11-13 NOTE — TELEPHONE ENCOUNTER
----- Message from Heather Chawla sent at 11/13/2020 11:51 AM CST -----  Regarding: Advice  Contact: stephanie  Type: Needs Medical Advice    Who Called: Stephanie with [facility] Call Back Number: [phone]    Additional Information: need pt's insurance information

## 2020-11-23 NOTE — TELEPHONE ENCOUNTER
----- Message from Bernadine Fountain sent at 11/23/2020  2:26 PM CST -----  Contact: daughter  Type: Needs Medical Advice    Who Called:  PT daughter  Best Call Back Number: [phone]    Additional Information: Requesting a call back regarding getting a  saline port being put in to keep pt hydrated and to prevent pt from getting uti's  Please Advise ---Thank you

## 2020-11-25 NOTE — TELEPHONE ENCOUNTER
Spoke with pt, she states pt has moved to [place] closer to her. She is concerned as uti has not resolved. He is finishing abx tomorrow with no relief of sx. She does believe it is lack on hydration causing the UTIs, he struggles with swallowing with Parkinsons effecting his muscles. He chokes on it when he swallows. They just can not get him to drink enough and she is frustrated and not sure what to do for him.

## 2020-11-25 NOTE — TELEPHONE ENCOUNTER
----- Message from Gisela Brice MA sent at 11/25/2020  3:06 PM CST -----  Regarding: UIT/Home health/ call back/meds not working  PT daughter is making her 2nd call in regards to a call back in reference to a UTI and a home health nurse and has not heard anything back, Daughter is waiting a call back today   Call back # [phone]

## 2020-11-27 NOTE — TELEPHONE ENCOUNTER
----- Message from Tawana Flor sent at 11/27/2020  1:07 PM CST -----  Contact: pt  Type: Return Call    Who Called: pt daughter/Bailey  Who Left Message for Pt: nurse  Does the patient know what this is regarding: yes  Best Call Back Number: [phone]  Additional Info-Please Advise-Thank you~

## 2020-11-27 NOTE — TELEPHONE ENCOUNTER
Called [facility], they are closed for the holiday and will reopen on Monday  Called pt's daughter, no answer, no option to LM

## 2020-12-01 NOTE — TELEPHONE ENCOUNTER
----- Message from Neeta Clifford sent at 12/1/2020  8:48 AM CST -----  Regarding: advice  Contact: [facility]/Radha/[phone]  Type: Needs Medical Advice  Who Called:  Domingo/[phone]    Additional Information: They received a request for [facility]. [facility] does not go to [place] and wants to know if it is okay if she places him with another agency. Please call to advise.

## 2020-12-01 NOTE — TELEPHONE ENCOUNTER
Returned call to Radha, gave ok to use other agency. Asked that they please contact pt's daughter for visit set up

## 2020-12-01 NOTE — TELEPHONE ENCOUNTER
Spoke with Faby at [facility], HH has been approved.   Radha at [facility] confirmed pt will be set up with HH service in [place] and daughter will be contacted for visit set up

## 2020-12-08 NOTE — TELEPHONE ENCOUNTER
----- Message from Ketan Harris sent at 12/8/2020 12:42 PM CST -----  Regarding: APpointment  Contact: [facility]  Patient need to schedule a hospital follow up appointment 7 days from today, no availability please call back at [phone] (home) patient is bed bound

## 2020-12-08 NOTE — TELEPHONE ENCOUNTER
----- Message from Ketan Harris sent at 12/8/2020 12:39 PM CST -----  Regarding: Advice  Contact: [facility], Dr. Henderson  Placed call to pod, Dr. Henderson need to speak with Dr. Stout please call back at [phone]

## 2020-12-08 NOTE — TELEPHONE ENCOUNTER
----- Message from Ketan Harris sent at 12/8/2020 12:46 PM CST -----  Regarding: APpointment  Contact: aileen borges [facility]  Patient need to schedule a hospital within 2 weeks a virtual appointment for first time, patient is bed bound want to know if office can make exception to see him on virtual for the first time please call back at [phone] (home)     Case number [id number]

## 2020-12-15 NOTE — TELEPHONE ENCOUNTER
----- Message from Gail Jewell sent at 12/14/2020  3:30 PM CST -----  Type: Needs Medical Advice  Who Called:  Erwin with [employer]  Symptoms (please be specific):  Frequent UTI's  Best Call Back Number:   Additional Information: The patient's wife called there and wanted to find out if the patient get more doses of an antibiotic drip that he was prescribed in the hospital as he has frequent UTI's.

## 2020-12-15 NOTE — TELEPHONE ENCOUNTER
----- Message from Heather Chawla sent at 12/15/2020  2:50 PM CST -----  Regarding: Advice  Contact: pt daughter Bailey  Type: Needs Medical Advice    Who Called:  pt's daughter Bailey  Symptoms (please be specific):  n/a  How long has patient had these symptoms:  n/a  Pharmacy name and phone #:  n/a    Best Call Back Number: [phone] or [phone]    Additional Information: need more days added to antibiotics  prescription. And also need palliative care scheduled ASAP asking for an order to go in for [facility] (in [place]). And needs to speak with someone regarding having pt's IV midline removed. Please call and speak with someone else at [facility] regarding pt if Erwin is unavailable [phone] .Please call to advise

## 2020-12-15 NOTE — TELEPHONE ENCOUNTER
I agree to Home Health administering 3 more days of Cefipime 2 gm IV bid.  Ask home health who they want to send the patient to to get the indwelling line removed.  I approve palliative care for after Home Health has discharged him. I do not know the name of his home health.  I assume the hospital ordered it.

## 2020-12-15 NOTE — TELEPHONE ENCOUNTER
Spoke with pt's daughter, HH nurse came yesterday and bobbi labs asking for results. Will check faxes for those  Abx given to him in hospital was beneficial for pneumonia but not for uti sx until a couple days ago. Asking for a few more days of abx to fully relieve sx. Rx was cefepime BID, using interfase plus with some improvement in his cognitive impairment (talking nonsense) He has been receiving the abx through midline  Asking for order for palliative care to [facility] in   Midline in place, asking who will remove it since pt is bedbound and HH can not remove it.

## 2020-12-16 NOTE — TELEPHONE ENCOUNTER
Gave verbal order for Rx to Erwin at [facility], he will also have PICC removed once abx are complete  Called and updated pt's daughter, she expressed understanding  Referral for palliative care faxed to [facility]

## 2020-12-23 NOTE — TELEPHONE ENCOUNTER
----- Message from Briana Ramirez sent at 12/22/2020  3:03 PM CST -----  Type: Needs Medical Advice    Who Called:  Angelika with [facility]  Best Call Back Number: [phone]  Additional Information:  Calling concerning referral received/please call back.

## 2020-12-24 NOTE — TELEPHONE ENCOUNTER
----- Message from Ketan Harris sent at 12/24/2020 10:52 AM CST -----  Regarding: refill  Contact: Caregiver, dasha  Type:  RX Refill Request    Who Called:  Dasha  Refill or New Rx:  Refill  RX Name and Strength:  oxyCODONE    How is the patient currently taking it? (ex. 1XDay):  as needed  Is this a 30 day or 90 day RX:  30day  Preferred Pharmacy with phone number:      [facility] at [address] [address]  Phone: [phone] Fax: [phone]     Local or Mail Order:  Local  Ordering Provider:  Dr. Girish Noguera Call Back Number:  [phone] (home)

## 2020-12-24 NOTE — TELEPHONE ENCOUNTER
Please see rx request below. No Dasha listed on pt's Involvement in Care form. Pt is currently on hospice, should the family contact hospice for this type of medication request?

## 2020-12-24 NOTE — TELEPHONE ENCOUNTER
----- Message from Kenia Hawkins sent at 12/24/2020 11:19 AM CST -----  Contact: [facility]  Type: Needs Medical Advice  Who Called:  pharmacy  Symptoms (please be specific):  na  How long has patient had these symptoms:  na  Pharmacy name and phone #:  na  Best Call Back Number:   [facility] [place] [id number]  [facility] [address]  Phone: [phone] Fax: [phone]  Additional Information: [facility] stating medication oxyCODONE-acetaminophen (PERCOCET) 5-325 mg per tablet sent did not say medically neccessary more than 7 days can yall resend they close at 12.  Thanks!

## 2020-12-29 NOTE — PROGRESS NOTES
Health Maintenance Due   Topic Date Due    TETANUS VACCINE  02/07/1958    Shingles Vaccine (1 of 2) 02/07/1991    Influenza Vaccine (1) 08/01/2020     Updates were requested from care everywhere.  Chart was reviewed for overdue [facility]active [facility] Encounters (SILVIA) topics (CRS, Breast Cancer Screening, Eye exam)  Health Maintenance has been updated.  LINKS immunization registry triggered.  LINKS not responding.

## 2020-12-30 NOTE — TELEPHONE ENCOUNTER
I spoke with DtrBailey. He is now on Hospice. They did reach an agreement to treat UTI with IV Cefipime.  He gets hallucinations and paranoia with UTI.  Tomorrow 's virtual visit not needed and will be cancelled

## 2021-08-19 NOTE — ASSESSMENT & PLAN NOTE
Contributing Nutrition Diagnosis  Moderate Chronic protein calorie malnutrition    Related to (etiology):   Decreased appetite, increased needs r/t tremor    Signs and Symptoms (as evidenced by):   PO intakes < 75% estimated needs for > 1 year and moderate muscle/ fat wasting @ clavicle, temples, orbital area, calves, interosseous, pattellar region, and shoulders    Interventions/Recommendations (treatment strategy):  1) Boost Plus daily 2) Allow family to bring in food/supplements that pt would like 3) Consider supplemental TF if PO intakes not meeting 75% estimated needs    Nutrition Diagnosis Status:   New     .

## 2022-02-28 ENCOUNTER — PATIENT MESSAGE (OUTPATIENT)
Dept: ADMINISTRATIVE | Facility: HOSPITAL | Age: 82
End: 2022-02-28
Payer: MEDICARE

## 2022-04-14 NOTE — PLAN OF CARE
PRIMARY DIAGNOSIS: GENERALIZED WEAKNESS    OUTPATIENT/OBSERVATION GOALS TO BE MET BEFORE DISCHARGE  1. Orthostatic performed: N/A    2. Tolerating PO medications: Yes    3. Return to near baseline physical activity: Yes    4. Cleared for discharge by consultants (if involved): Yes    Discharge Planner Nurse   Safe discharge environment identified: Yes. TCU  Barriers to discharge: Yes. Awaiting TCU placement       Entered by: Jessica Yan 04/13/2022 9:52 PM         Pt is  AAOX4. POC reviewed with pt. Pt verbalized understanding. VSS. Remains afebrile. IVF infusing per order. IV abx infused per order. Pain controlled with PRN medications. Remains free of injury. Bed low, breaks locked, call light in reach. Will monitor.

## 2022-06-30 ENCOUNTER — PATIENT OUTREACH (OUTPATIENT)
Dept: ADMINISTRATIVE | Facility: HOSPITAL | Age: 82
End: 2022-06-30
Payer: MEDICARE

## 2022-08-02 NOTE — NURSING
Ordered by Dr. Manuel to DC pt with midline for outpatient IV ABX.    Psychiatry consult received, chart reviewed.  Attempted assessment, but pt refused per RN.

## 2022-11-03 NOTE — TELEPHONE ENCOUNTER
Rand complex cataract Malyugin ring  PREOPERATIVE DIAGNOSIS:   Nuclear cataract right eye.  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     POSTOPERATIVE DIAGNOSIS:   Complex  Nuclear cataract right eye.   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    SURGEON:  Austin Gordillo MD    Assistant: None     ANESTHESIA:  CRNA: Al Jacob, CRNA Topical MAC.     PROCEDURE:  Dilation of pupil by Malyugin ring, cataract removal by phacoemulsification with posterior chamber intraocular lens implant right eye.     COMPLICATIONS:  None.      SPECIMENS:  None.      INDICATIONS FOR SURGERY:  The patient is complaining of painless progressive decrease in vision which interferes with all activities of daily living.  On exam, the best corrected vision is 20/800 in the right eye.  The intraocular pressure is 14 preoperatively.  On slit lamp and dilated examination, the patient has a nuclear cataract, and is on Flomax, with suboptimal dilation.  We discussed the findings with the patient, as well as risks, hazards, alternatives and complications.  The patient wishes to proceed with surgery in the hopes of obtaining better vision for better functioning in all activities of daily living.  The patient accepts and agrees, after this discussion, and signed the consent preoperatively.      DESCRIPTION OF PROCEDURE:  Under appropriate monitoring in the operating room, the patient was given topical anesthetic.  The patient was prepped and draped in the usual manner for intraocular surgery.  The lid speculum was placed.  A clear corneal incision was made temporally using a 2.4 mm keratome.  A separate paracentesis wound was made superiorly using a 15-degree blade.  There was suboptimal dilation.  The iris was noted to be very floppy with injection of fluid. We elected to use mechanical retraction of the iris. This was done under Viscoelastic. The Malyugin ring was placed with the appropriate  Received labs  BUN 27  BUN/Creatinine ratio 29  RBC 3.58  Hemoglobin 11.6  Hematocrit 35.3  MCV 99  All other values WNL   instrument. This provided good traction on the iris and good pupil dilation. We then performed a capsulorrhexis under Viscoelastic using a cystotome and forceps.   Hydrodissection was performed.  The nucleus was phacoemulsified uneventfully finding it to be a brunescent and severe nucleus.  We then switched to irrigation and aspiration and removed the remaining cortical material.  The posterior capsule was intact. There was a good red reflex.   We inflated the capsular bag with additional Viscoelastic.  The intraocular lens implant was placed into the capsular bag using the pre-loaded Ultrasert injector.  The Malyugin ring was retracted and removed using the . The Viscoelastic was removed using irrigation and aspiration.   The intraocular lens implant was in excellent position, with intact posterior capsule and good red reflex. The wound was hydrated, then tested and found to be watertight.  Intra-ophthalmic Vancomycin 0.1 mL was given into the anterior chamber and 0.2 mL of the same Vancomycin was given subconjunctivally. We again tested the wound and found it to be watertight. Prednisolone drops and Maxitrol Ointment were placed on the eye.  A clear shield was placed over the eye.  The patient was stable and taken to the recovery area in stable condition.     COMPLICATIONS:   None.     SPECIMENS:   None.    Implant Name Type Inv. Item Serial No.  Lot No. LRB No. Used Action   LENS IOL PC 0 D +19.5 PAUL MOD L BCNVX ACRYSOF IQ - [id number] Lens LENS IOL PC 0 D +19.5 PAUL MOD L BCNVX ACRYSOF IQ [id number] [employer] . Right 1 Implanted
